# Patient Record
Sex: FEMALE | Race: WHITE | NOT HISPANIC OR LATINO | Employment: OTHER | URBAN - NONMETROPOLITAN AREA
[De-identification: names, ages, dates, MRNs, and addresses within clinical notes are randomized per-mention and may not be internally consistent; named-entity substitution may affect disease eponyms.]

---

## 2018-01-26 ENCOUNTER — DOCUMENTATION ONLY (OUTPATIENT)
Dept: FAMILY MEDICINE | Facility: OTHER | Age: 23
End: 2018-01-26

## 2018-06-24 ENCOUNTER — OFFICE VISIT (OUTPATIENT)
Dept: FAMILY MEDICINE | Facility: OTHER | Age: 23
End: 2018-06-24
Attending: NURSE PRACTITIONER
Payer: COMMERCIAL

## 2018-06-24 VITALS
WEIGHT: 236.9 LBS | TEMPERATURE: 98.7 F | HEART RATE: 80 BPM | HEIGHT: 67 IN | DIASTOLIC BLOOD PRESSURE: 66 MMHG | BODY MASS INDEX: 37.18 KG/M2 | SYSTOLIC BLOOD PRESSURE: 112 MMHG | RESPIRATION RATE: 18 BRPM

## 2018-06-24 DIAGNOSIS — R39.89 URINARY PROBLEM: Primary | ICD-10-CM

## 2018-06-24 DIAGNOSIS — N30.01 ACUTE CYSTITIS WITH HEMATURIA: ICD-10-CM

## 2018-06-24 LAB
ALBUMIN UR-MCNC: ABNORMAL MG/DL
APPEARANCE UR: CLEAR
BACTERIA #/AREA URNS HPF: ABNORMAL /HPF
BILIRUB UR QL STRIP: NEGATIVE
COLOR UR AUTO: YELLOW
GLUCOSE UR STRIP-MCNC: NEGATIVE MG/DL
HGB UR QL STRIP: ABNORMAL
KETONES UR STRIP-MCNC: NEGATIVE MG/DL
LEUKOCYTE ESTERASE UR QL STRIP: NEGATIVE
NITRATE UR QL: NEGATIVE
NON-SQ EPI CELLS #/AREA URNS LPF: ABNORMAL /LPF
PH UR STRIP: 6 PH (ref 5–7)
RBC #/AREA URNS AUTO: ABNORMAL /HPF
SOURCE: ABNORMAL
SP GR UR STRIP: >1.03 (ref 1–1.03)
UROBILINOGEN UR STRIP-ACNC: 0.2 EU/DL (ref 0.2–1)
WBC #/AREA URNS AUTO: ABNORMAL /HPF

## 2018-06-24 PROCEDURE — 81001 URINALYSIS AUTO W/SCOPE: CPT | Performed by: NURSE PRACTITIONER

## 2018-06-24 PROCEDURE — G0463 HOSPITAL OUTPT CLINIC VISIT: HCPCS

## 2018-06-24 PROCEDURE — 87086 URINE CULTURE/COLONY COUNT: CPT | Performed by: NURSE PRACTITIONER

## 2018-06-24 PROCEDURE — 99213 OFFICE O/P EST LOW 20 MIN: CPT | Performed by: NURSE PRACTITIONER

## 2018-06-24 RX ORDER — PHENAZOPYRIDINE HYDROCHLORIDE 200 MG/1
200 TABLET, FILM COATED ORAL 3 TIMES DAILY PRN
Qty: 6 TABLET | Refills: 0 | Status: SHIPPED | OUTPATIENT
Start: 2018-06-24 | End: 2018-09-14

## 2018-06-24 RX ORDER — SULFAMETHOXAZOLE/TRIMETHOPRIM 800-160 MG
1 TABLET ORAL 2 TIMES DAILY
Qty: 14 TABLET | Refills: 0 | Status: SHIPPED | OUTPATIENT
Start: 2018-06-24 | End: 2018-09-14

## 2018-06-24 ASSESSMENT — PAIN SCALES - GENERAL: PAINLEVEL: NO PAIN (0)

## 2018-06-24 NOTE — PATIENT INSTRUCTIONS
Urinary Tract Infections in Women    Urinary tract infections (UTIs) are most often caused by bacteria. These bacteria enter the urinary tract. The bacteria may come from outside the body. Or they may travel from the skin outside the rectum or vagina into the urethra. Female anatomy makes it easy for bacteria from the bowel to enter a woman s urinary tract, which is the most common source of UTI. This means women develop UTIs more often than men. Pain in or around the urinary tract is a common UTI symptom. But the only way to know for sure if you have a UTI for the healthcare provider to test your urine. The two tests that may be done are the urinalysis and urine culture.  Types of UTIs    Cystitis. A bladder infection (cystitis) is the most common UTI in women. You may have urgent or frequent urination. You may also have pain, burning when you urinate, and bloody urine.    Urethritis. This is an inflamed urethra, which is the tube that carries urine from the bladder to outside the body. You may have lower stomach or back pain. You may also have urgent or frequent urination.    Pyelonephritis. This is a kidney infection. If not treated, it can be serious and damage your kidneys. In severe cases, you may need to stay in the hospital. You may have a fever and lower back pain.  Medicines to treat a UTI  Most UTIs are treated with antibiotics. These kill the bacteria. The length of time you need to take them depends on the type of infection. It may be as short as 3 days. If you have repeated UTIs, you may need a low-dose antibiotic for several months. Take antibiotics exactly as directed. Don t stop taking them until all of the medicine is gone. If you stop taking the antibiotic too soon, the infection may not go away. You may also develop a resistance to the antibiotic. This can make it much harder to treat.  Lifestyle changes to treat and prevent UTIs  The lifestyle changes below will help get rid of your UTI. They  may also help prevent future UTIs.    Drink plenty of fluids. This includes water, juice, or other caffeine-free drinks. Fluids help flush bacteria out of your body.    Empty your bladder. Always empty your bladder when you feel the urge to urinate. And always urinate before going to sleep. Urine that stays in your bladder can lead to infection. Try to urinate before and after sex as well.    Practice good personal hygiene. Wipe yourself from front to back after using the toilet. This helps keep bacteria from getting into the urethra.    Use condoms during sex. These help prevent UTIs caused by sexually transmitted bacteria. Also don't use spermicides during sex. These can increase the risk for UTIs. Choose other forms of birth control instead. For women who tend to get UTIs after sex, a low-dose of a preventive antibiotic may be used. Be sure to discuss this option with your healthcare provider.    Follow up with your healthcare provider as directed. He or she may test to make sure the infection has cleared. If needed, more treatment may be started.  Date Last Reviewed: 1/1/2017 2000-2017 [employer]. [address] [place] [address]. All rights reserved. This information is not intended as a substitute for professional medical care. Always follow your healthcare professional's instructions.

## 2018-06-24 NOTE — LETTER
[facility]  [address]  [place] [address]  Phone: [phone]  Fax: [phone]    June 24, 2018        Alize Clifton  [address] [place] [id number]          To whom it may concern:    RE: Alize Clifton    Patient was seen and treated today at our clinic and missed work.    Please contact me for questions or concerns.      Sincerely,        Nieves Parmar NP

## 2018-06-24 NOTE — NURSING NOTE
Patient presents to the clinic for possible UTI. Patient states her s/sx include front and back cramps, burning with urination, frequency and blood in urine. S/sx started about 3 or 4 days ago.   Marguerite Vargas LPN............. June 24, 2018 6:40 PM

## 2018-06-24 NOTE — MR AVS SNAPSHOT
After Visit Summary   6/24/2018    Alzie Clifton    MRN: [id number]           Patient Information     Date Of Birth          1995        Visit Information        Provider Department      6/24/2018 6:15 PM Nieves Parmar NP [facility]        Today's Diagnoses     Urinary problem    -  1    Acute cystitis with hematuria          Care Instructions      Urinary Tract Infections in Women    Urinary tract infections (UTIs) are most often caused by bacteria. These bacteria enter the urinary tract. The bacteria may come from outside the body. Or they may travel from the skin outside the rectum or vagina into the urethra. Female anatomy makes it easy for bacteria from the bowel to enter a woman s urinary tract, which is the most common source of UTI. This means women develop UTIs more often than men. Pain in or around the urinary tract is a common UTI symptom. But the only way to know for sure if you have a UTI for the healthcare provider to test your urine. The two tests that may be done are the urinalysis and urine culture.  Types of UTIs    Cystitis. A bladder infection (cystitis) is the most common UTI in women. You may have urgent or frequent urination. You may also have pain, burning when you urinate, and bloody urine.    Urethritis. This is an inflamed urethra, which is the tube that carries urine from the bladder to outside the body. You may have lower stomach or back pain. You may also have urgent or frequent urination.    Pyelonephritis. This is a kidney infection. If not treated, it can be serious and damage your kidneys. In severe cases, you may need to stay in the hospital. You may have a fever and lower back pain.  Medicines to treat a UTI  Most UTIs are treated with antibiotics. These kill the bacteria. The length of time you need to take them depends on the type of infection. It may be as short as 3 days. If you have repeated UTIs, you may need a low-dose  antibiotic for several months. Take antibiotics exactly as directed. Don t stop taking them until all of the medicine is gone. If you stop taking the antibiotic too soon, the infection may not go away. You may also develop a resistance to the antibiotic. This can make it much harder to treat.  Lifestyle changes to treat and prevent UTIs  The lifestyle changes below will help get rid of your UTI. They may also help prevent future UTIs.    Drink plenty of fluids. This includes water, juice, or other caffeine-free drinks. Fluids help flush bacteria out of your body.    Empty your bladder. Always empty your bladder when you feel the urge to urinate. And always urinate before going to sleep. Urine that stays in your bladder can lead to infection. Try to urinate before and after sex as well.    Practice good personal hygiene. Wipe yourself from front to back after using the toilet. This helps keep bacteria from getting into the urethra.    Use condoms during sex. These help prevent UTIs caused by sexually transmitted bacteria. Also don't use spermicides during sex. These can increase the risk for UTIs. Choose other forms of birth control instead. For women who tend to get UTIs after sex, a low-dose of a preventive antibiotic may be used. Be sure to discuss this option with your healthcare provider.    Follow up with your healthcare provider as directed. He or she may test to make sure the infection has cleared. If needed, more treatment may be started.  Date Last Reviewed: 1/1/2017 2000-2017 [employer]. [address] [place] [address], [place] [address]. All rights reserved. This information is not intended as a substitute for professional medical care. Always follow your healthcare professional's instructions.                Follow-ups after your visit        Who to contact     If you have questions or need follow up information about today's clinic visit or your schedule please contact [facility] directly at [phone].  Normal or non-critical lab and imaging results will be communicated to you by MyChart, letter or phone within 4 business days after the clinic has received the results. If you do not hear from us within 7 days, please contact the clinic through Rambushart or phone. If you have a critical or abnormal lab result, we will notify you by phone as soon as possible.  Submit refill requests through Fuhu or call your pharmacy and they will forward the refill request to us. Please allow 3 business days for your refill to be completed.          Additional Information About Your Visit        Rambushart Information     Fuhu lets you send messages to your doctor, view your test results, renew your prescriptions, schedule appointments and more. To sign up, go to [email or web address] . Click on \"Log in\" on the left side of the screen, which will take you to the Welcome page. Then click on \"Sign up Now\" on the right side of the page.     You will be asked to enter the access code listed below, as well as some personal information. Please follow the directions to create your username and password.     Your access code is: [email or web address]  Expires: 2018  6:55 PM     Your access code will  in 90 days. If you need help or a new code, please call your [facility] clinic or [phone].        Care EveryWhere ID     This is your Care EveryWhere ID. This could be used by other organizations to access your [facility] medical records  [id number]        Your Vitals Were     Pulse Temperature Respirations Height Last Period Breastfeeding?    80 98.7  F (37.1  C) (Tympanic) 18 5' 7\" (1.702 m) 2018 No    BMI (Body Mass Index)                   37.1 kg/m2            Blood Pressure from Last 3 Encounters:   18 112/66   16 128/68    Weight from Last 3 Encounters:   18 236 lb 14.4 oz (107.5 kg)   16 202 lb 12.8 oz (92 kg) (98 %)*     * Growth percentiles are based on CDC 2-20 " Years data.              We Performed the Following     *UA reflex to Microscopic     Urine Culture Aerobic Bacterial     Urine Microscopic          Today's Medication Changes          These changes are accurate as of 6/24/18  6:55 PM.  If you have any questions, ask your nurse or doctor.               Start taking these medicines.        Dose/Directions    phenazopyridine 200 MG tablet   Commonly known as:  PYRIDIUM   Used for:  Acute cystitis with hematuria   Started by:  Nieves Parmar NP        Dose:  200 mg   Take 1 tablet (200 mg) by mouth 3 times daily as needed for irritation   Quantity:  6 tablet   Refills:  0       sulfamethoxazole-trimethoprim 800-160 MG per tablet   Commonly known as:  BACTRIM DS/SEPTRA DS   Used for:  Acute cystitis with hematuria   Started by:  Nieves Parmar NP        Dose:  1 tablet   Take 1 tablet by mouth 2 times daily   Quantity:  14 tablet   Refills:  0            Where to get your medicines      Information about where to get these medications is not yet available     ! Ask your nurse or doctor about these medications     phenazopyridine 200 MG tablet    sulfamethoxazole-trimethoprim 800-160 MG per tablet                Primary Care Provider Office Phone # Fax Barber Beard Ayazin [phone]       [facility] [address] [facility] [address]        Equal Access to Services     [facility] AH: Hadii andrew Llamas, waluísda lusanya, qaybta kaalmada adejean carlos, shante cortez . So [facility] [phone].    ATENCIÓN: Si habla español, tiene a mireles disposición servicios gratuitos de asistencia lingüística. Llame al [phone].    We comply with applicable federal civil rights laws and [place] laws. We do not discriminate on the basis of race, color, national origin, age, disability, sex, sexual orientation, or gender identity.            Thank you!     Thank you for choosing [facility]  for your care.  Our goal is always to provide you with excellent care. Hearing back from our patients is one way we can continue to improve our services. Please take a few minutes to complete the written survey that you may receive in the mail after your visit with us. Thank you!             Your Updated Medication List - Protect others around you: Learn how to safely use, store and throw away your medicines at www.disposemymeds.org.          This list is accurate as of 6/24/18  6:55 PM.  Always use your most recent med list.                   Brand Name Dispense Instructions for use Diagnosis    phenazopyridine 200 MG tablet    PYRIDIUM    6 tablet    Take 1 tablet (200 mg) by mouth 3 times daily as needed for irritation    Acute cystitis with hematuria       sulfamethoxazole-trimethoprim 800-160 MG per tablet    BACTRIM DS/SEPTRA DS    14 tablet    Take 1 tablet by mouth 2 times daily    Acute cystitis with hematuria

## 2018-06-24 NOTE — PROGRESS NOTES
"Nursing Notes:   Marguerite Vargas LPN  6/24/2018  6:40 PM  Addendum  Patient presents to the clinic for possible UTI. Patient states her s/sx include front and back cramps, burning with urination, frequency and blood in urine. S/sx started about 3 or 4 days ago.   Marguerite Vargas LPN............. June 24, 2018 6:40 PM     SUBJECTIVE:   Alize Clifton is a 22 year old female who presents to clinic today for the following health issues:    URINARY TRACT SYMPTOMS      Duration: 3 days    Description  dysuria, frequency, urgency and hematuria    Intensity:  moderate    Accompanying signs and symptoms:  Fever/chills: no   Flank pain no   Nausea and vomiting: no   Vaginal symptoms: none  Abdominal/Pelvic Pain: no     History  History of frequent UTI's: no   History of kidney stones: no   Sexually Active: YES  Possibility of pregnancy: No    Precipitating or alleviating factors: None    Therapies tried and outcome: increase fluid intake   Outcome: Not helpful        Problem list and histories reviewed & adjusted, as indicated.  Additional history: as documented    Current Outpatient Prescriptions   Medication Sig Dispense Refill     phenazopyridine (PYRIDIUM) 200 MG tablet Take 1 tablet (200 mg) by mouth 3 times daily as needed for irritation 6 tablet 0     sulfamethoxazole-trimethoprim (BACTRIM DS/SEPTRA DS) 800-160 MG per tablet Take 1 tablet by mouth 2 times daily 14 tablet 0     Allergies   Allergen Reactions     Azithromycin Rash         ROS:  Notable findings in the HPI.       OBJECTIVE:     /66 (BP Location: Right arm, Patient Position: Sitting, Cuff Size: Adult Regular)  Pulse 80  Temp 98.7  F (37.1  C) (Tympanic)  Resp 18  Ht 5' 7\" (1.702 m)  Wt 236 lb 14.4 oz (107.5 kg)  LMP 05/13/2018  Breastfeeding? No  BMI 37.1 kg/m2  Body mass index is 37.1 kg/(m^2).  GENERAL: healthy, alert and no distress  HENT: normal cephalic/atraumatic and oral mucous membranes moist  RESP: with " ease  ABDOMEN: tenderness suprapubic and bowel sounds normal  SKIN: no suspicious lesions or rashes    Diagnostic Test Results:  Results for orders placed or performed in visit on 06/24/18 (from the past 24 hour(s))   *UA reflex to Microscopic   Result Value Ref Range    Color Urine Yellow     Appearance Urine Clear     Glucose Urine Negative NEG^Negative mg/dL    Bilirubin Urine Negative NEG^Negative    Ketones Urine Negative NEG^Negative mg/dL    Specific Gravity Urine >1.030 1.003 - 1.035    Blood Urine Trace (A) NEG^Negative    pH Urine 6.0 5.0 - 7.0 pH    Protein Albumin Urine Trace (A) NEG^Negative mg/dL    Urobilinogen Urine 0.2 0.2 - 1.0 EU/dL    Nitrite Urine Negative NEG^Negative    Leukocyte Esterase Urine Negative NEG^Negative    Source Midstream Urine    Urine Microscopic   Result Value Ref Range    WBC Urine 5-10 (A) OTO5^0 - 5 /HPF    RBC Urine O - 2 OTO2^O - 2 /HPF    Squamous Epithelial /LPF Urine Few FEW^Few /LPF    Bacteria Urine Many (A) NEG^Negative /HPF       ASSESSMENT/PLAN:     1. Urinary problem  - *UA reflex to Microscopic  - Urine Culture Aerobic Bacterial  - Urine Microscopic    2. Acute cystitis with hematuria  - sulfamethoxazole-trimethoprim (BACTRIM DS/SEPTRA DS) 800-160 MG per tablet; Take 1 tablet by mouth 2 times daily  Dispense: 14 tablet; Refill: 0  - phenazopyridine (PYRIDIUM) 200 MG tablet; Take 1 tablet (200 mg) by mouth 3 times daily as needed for irritation  Dispense: 6 tablet; Refill: 0    Medical Decision Making:    Differential Diagnosis:  UTI: UTI, Dysuria and Pyelonephritis    Serious Comorbid Conditions:  Adult:  None    PLAN:    UTI Adult:  Meds as per [facility]. F/U if needed. Push fluids.     Followup:    If not improving or if condition worsens, follow up with your Primary Care Provider    Disclaimer:  This note consists of words and symbols derived from keyboarding, dictation, or using voice recognition software. As a result, there may be errors in the script that  have gone undetected. Please consider this when interpreting information found in this note.      Nieves Parmar NP, 6/24/2018 6:42 PM

## 2018-06-26 LAB
BACTERIA SPEC CULT: NORMAL
SPECIMEN SOURCE: NORMAL

## 2018-09-14 ENCOUNTER — HOSPITAL ENCOUNTER (OUTPATIENT)
Dept: GENERAL RADIOLOGY | Facility: OTHER | Age: 23
Discharge: HOME | End: 2018-09-14
Attending: NURSE PRACTITIONER | Admitting: NURSE PRACTITIONER
Payer: COMMERCIAL

## 2018-09-14 ENCOUNTER — OFFICE VISIT (OUTPATIENT)
Dept: FAMILY MEDICINE | Facility: OTHER | Age: 23
End: 2018-09-14
Payer: COMMERCIAL

## 2018-09-14 VITALS
TEMPERATURE: 98.7 F | OXYGEN SATURATION: 97 % | SYSTOLIC BLOOD PRESSURE: 128 MMHG | HEIGHT: 67 IN | BODY MASS INDEX: 35.99 KG/M2 | WEIGHT: 229.3 LBS | HEART RATE: 93 BPM | DIASTOLIC BLOOD PRESSURE: 70 MMHG

## 2018-09-14 DIAGNOSIS — J06.9 URI WITH COUGH AND CONGESTION: Primary | ICD-10-CM

## 2018-09-14 LAB
BASOPHILS # BLD AUTO: 0 10E9/L (ref 0–0.2)
BASOPHILS NFR BLD AUTO: 0.3 %
DIFFERENTIAL METHOD BLD: NORMAL
EOSINOPHIL # BLD AUTO: 0.1 10E9/L (ref 0–0.7)
EOSINOPHIL NFR BLD AUTO: 1 %
ERYTHROCYTE [DISTWIDTH] IN BLOOD BY AUTOMATED COUNT: 13.2 % (ref 10–15)
HCT VFR BLD AUTO: 39.4 % (ref 35–47)
HGB BLD-MCNC: 13.3 G/DL (ref 11.7–15.7)
IMM GRANULOCYTES # BLD: 0 10E9/L (ref 0–0.4)
IMM GRANULOCYTES NFR BLD: 0.4 %
LYMPHOCYTES # BLD AUTO: 2.1 10E9/L (ref 0.8–5.3)
LYMPHOCYTES NFR BLD AUTO: 21.4 %
MCH RBC QN AUTO: 28.5 PG (ref 26.5–33)
MCHC RBC AUTO-ENTMCNC: 33.8 G/DL (ref 31.5–36.5)
MCV RBC AUTO: 84 FL (ref 78–100)
MONOCYTES # BLD AUTO: 0.5 10E9/L (ref 0–1.3)
MONOCYTES NFR BLD AUTO: 5.4 %
NEUTROPHILS # BLD AUTO: 7.1 10E9/L (ref 1.6–8.3)
NEUTROPHILS NFR BLD AUTO: 71.5 %
PLATELET # BLD AUTO: 332 10E9/L (ref 150–450)
RBC # BLD AUTO: 4.67 10E12/L (ref 3.8–5.2)
WBC # BLD AUTO: 9.9 10E9/L (ref 4–11)

## 2018-09-14 PROCEDURE — 36415 COLL VENOUS BLD VENIPUNCTURE: CPT | Performed by: NURSE PRACTITIONER

## 2018-09-14 PROCEDURE — G0463 HOSPITAL OUTPT CLINIC VISIT: HCPCS | Mod: 25

## 2018-09-14 PROCEDURE — 85025 COMPLETE CBC W/AUTO DIFF WBC: CPT | Performed by: NURSE PRACTITIONER

## 2018-09-14 PROCEDURE — G0463 HOSPITAL OUTPT CLINIC VISIT: HCPCS

## 2018-09-14 PROCEDURE — 99214 OFFICE O/P EST MOD 30 MIN: CPT | Performed by: NURSE PRACTITIONER

## 2018-09-14 PROCEDURE — 71046 X-RAY EXAM CHEST 2 VIEWS: CPT

## 2018-09-14 RX ORDER — BENZONATATE 200 MG/1
200 CAPSULE ORAL 3 TIMES DAILY PRN
Qty: 21 CAPSULE | Refills: 0 | Status: SHIPPED | OUTPATIENT
Start: 2018-09-14 | End: 2018-10-29

## 2018-09-14 ASSESSMENT — PAIN SCALES - GENERAL: PAINLEVEL: NO PAIN (0)

## 2018-09-14 NOTE — PATIENT INSTRUCTIONS
Viral Upper Respiratory Illness (Adult)  You have a viral upper respiratory illness (URI), which is another term for the common cold. This illness is contagious during the first few days. It is spread through the air by coughing and sneezing. It may also be spread by direct contact (touching the sick person and then touching your own eyes, nose, or mouth). Frequent handwashing will decrease risk of spread. Most viral illnesses go away within 7 to 10 days with rest and simple home remedies. Sometimes the illness may last for several weeks. Antibiotics will not kill a virus, and they are generally not prescribed for this condition.  Home care    If symptoms are severe, rest at home for the first 2 to 3 days. When you resume activity, don't let yourself get too tired.    Avoid being exposed to cigarette smoke (yours or others ).    You may use acetaminophen or ibuprofen to control pain and fever, unless another medicine was prescribed. If you have chronic liver or kidney disease, have ever had a stomach ulcer or gastrointestinal bleeding, or are taking blood-thinning medicines, talk with your healthcare provider before using these medicines. Aspirin should never be given to anyone under 18 years of age who is ill with a viral infection or fever. It may cause severe liver or brain damage.    Your appetite may be poor, so a light diet is fine. Avoid dehydration by drinking 6 to 8 glasses of fluids per day (water, soft drinks, juices, tea, or soup). Extra fluids will help loosen secretions in the nose and lungs.    Over-the-counter cold medicines will not shorten the length of time you re sick, but they may be helpful for the following symptoms: cough, sore throat, and nasal and sinus congestion. (Note: Do not use decongestants if you have high blood pressure.)  Follow-up care  Follow up with your healthcare provider, or as advised.  When to seek medical advice  Call your healthcare provider right away if any of these  occur:    Cough with lots of colored sputum (mucus)    Severe headache; face, neck, or ear pain    Difficulty swallowing due to throat pain    Fever of 100.4 F (38 C) or higher, or as directed by your healthcare provider  Call 911  Call 911 if any of these occur:    Chest pain, shortness of breath, wheezing, or difficulty breathing    Coughing up blood    Inability to swallow due to throat pain      Cold Medicines   What are cold medicines?  Symptoms of the common cold start gradually over several days and usually last about two weeks. Symptoms may include sneezing, a stuffy or runny nose, sore throat, cough, watery eyes, mild headache, or body aches. A cold will go away on its own without treatment. However, there are many nonprescription products that may help relieve some of the symptoms of a cold. Cold medicines often contain more than one ingredient and are used to treat more than one symptom. Read the labels and buy products that have only the ingredients that you need. If you are not sure which medicine is best, ask your pharmacist.  How do they work?  Decongestants reduce swelling in your nose and sinuses. They may also lessen the amount of mucus made by your nose. If you use decongestants more often than directed, your stuffy nose may get worse.   Antihistamines block the effect of histamine. Histamine is a chemical your body makes when you have an allergic reaction. Antihistamines are most often used to treat itchy or watery eyes or a stuffy or runny nose caused by an allergy. Antihistamines may not help a stuffy or runny nose caused by a cold because they can make mucus thick and dry.  Mucolytics are medicines that make mucus thinner so that it is easier to cough up out of your throat and lungs.  Expectorants are cough medicines that may help to keep the mucus thin and bring up mucus from the lungs when you cough. This may relieve chest congestion and make it easier to breathe.   Cough suppressants  (antitussives) are medicines that lessen the urge to cough. They may give relief from a dry, hacking cough. If you have a cough that is wet sounding and produces mucus, it is important for you to cough the mucus up out of your lungs. For this reason, cough suppressants are not recommended for a wet sounding cough.  Fever and pain relievers, such as acetaminophen, aspirin, or other nonsteroidal anti-inflammatory drugs (NSAIDs), are often included in cold medicine. Read labels carefully to avoid taking more medicine than you need.  What else do I need to know about this medicine?    Talk to your healthcare provider if your symptoms start suddenly or you have severe symptoms. This may mean you have something more serious than a cold.    Follow the directions that come with your medicine, including information about food or alcohol. Make sure you know how and when to take your medicine. Do not take more or less than you are supposed to take.    Try to get all of your medicine at the same place. Your pharmacist can help make sure that all of your medicines are safe to take together.    Keep a list of your medicines with you. List all of the prescription medicines, nonprescription medicines, supplements, natural remedies, and vitamins that you take. Tell all healthcare providers who treat you about all of the products you are taking.    Many medicines have side effects. A side effect is a symptom or problem that is caused by the medicine. Ask your healthcare provider or pharmacist what side effects the medicine may cause and what you should do if you have side effects.    Nonsteroidal anti-inflammatory medicines (NSAIDs), such as ibuprofen, naproxen, and aspirin, may cause stomach bleeding and other problems. These risks increase with age. Read the label and take as directed. Unless recommended by your healthcare provider, do not take for more than 10 days for any reason.    Acetaminophen may cause liver damage or other  problems. Unless recommended by your provider, don't take more than 3000 milligrams (mg) in 24 hours. To make sure you don t take too much, check other medicines you take to see if they also contain acetaminophen. Ask your provider if you need to avoid drinking alcohol while taking this medicine.  If you have any questions, ask your healthcare provider or pharmacist for more information. Be sure to keep all appointments for provider visits or tests.

## 2018-09-14 NOTE — PROGRESS NOTES
"Nursing Notes:   Kelsey Hudson LPN  9/14/2018 11:34 AM  Signed  Patient presents with cough occasionally productive green, shortness of breath, fever for 1 week. Kelsey Husdon LPN .............9/14/2018  11:25 AM  Medication Reconciliation: complete    Kelsey Hudson LPN  ..................9/14/2018   11:25 AM    SUBJECTIVE:   Alize Clifton is a 22 year old female who presents to clinic today for the following health issues:    RESPIRATORY SYMPTOMS      Duration: 1 week, getting worse.     Description  nasal congestion, cough, myalgias and sweats and chills    Severity: moderate    Accompanying signs and symptoms: Fever on Monday, none noted at home. Has chills. Cough sometimes productive.     History (predisposing factors):  tobacco abuse and no asthma.     Precipitating or alleviating factors: None    Therapies tried and outcome:  rest and fluids acetaminophen      Problem list and histories reviewed & adjusted, as indicated.  Additional history: as documented    No current outpatient prescriptions on file.     Allergies   Allergen Reactions     Azithromycin Rash         ROS:  Notable findings in the HPI.       OBJECTIVE:     /70 (BP Location: Right arm, Patient Position: Sitting, Cuff Size: Adult Regular)  Pulse 93  Temp 98.7  F (37.1  C) (Tympanic)  Ht 5' 7.32\" (1.71 m)  Wt 229 lb 4.8 oz (104 kg)  LMP 08/13/2018  SpO2 97%  Breastfeeding? No  BMI 35.57 kg/m2  Body mass index is 35.57 kg/(m^2).  GENERAL: healthy, alert and no distress  EYES: Eyes grossly normal to inspection  HENT: normal cephalic/atraumatic, right ear: normal: no effusions, no erythema, normal landmarks, left ear: normal: no effusions, no erythema, normal landmarks, nose and mouth without ulcers or lesions, nasal mucosa edematous , rhinorrhea clear, oropharynx clear, oral mucous membranes moist and sinuses: not tender  NECK: no adenopathy  RESP: lungs clear to auscultation - no rales, rhonchi or wheezes " and dry cough noted  CV: regular rates and rhythm, normal S1 S2, no S3 or S4 and no murmur, click or rub  SKIN: no suspicious lesions or rashes  PSYCH: mentation appears normal, affect normal/bright    Diagnostic Test Results:  Results for orders placed or performed in visit on 09/14/18 (from the past 24 hour(s))   CBC with platelets differential   Result Value Ref Range    WBC 9.9 4.0 - 11.0 10e9/L    RBC Count 4.67 3.8 - 5.2 10e12/L    Hemoglobin 13.3 11.7 - 15.7 g/dL    Hematocrit 39.4 35.0 - 47.0 %    MCV 84 78 - 100 fl    MCH 28.5 26.5 - 33.0 pg    MCHC 33.8 31.5 - 36.5 g/dL    RDW 13.2 10.0 - 15.0 %    Platelet Count 332 150 - 450 10e9/L    Diff Method Automated Method     % Neutrophils 71.5 %    % Lymphocytes 21.4 %    % Monocytes 5.4 %    % Eosinophils 1.0 %    % Basophils 0.3 %    % Immature Granulocytes 0.4 %    Absolute Neutrophil 7.1 1.6 - 8.3 10e9/L    Absolute Lymphocytes 2.1 0.8 - 5.3 10e9/L    Absolute Monocytes 0.5 0.0 - 1.3 10e9/L    Absolute Eosinophils 0.1 0.0 - 0.7 10e9/L    Absolute Basophils 0.0 0.0 - 0.2 10e9/L    Abs Immature Granulocytes 0.0 0 - 0.4 10e9/L   XR Chest 2 Views    Narrative    PROCEDURE:  XR CHEST 2 VW    HISTORY:  ; URI with cough and congestion.     COMPARISON:  None.    FINDINGS:   The cardiac silhouette is normal in size. The pulmonary vasculature is  normal.  The lungs are clear. No pleural effusion or pneumothorax.      Impression    IMPRESSION:  No acute cardiopulmonary disease.      ELLA MOREJON MD     Completed chest xray.  I personally reviewed the xray. There was no infiltrates noted upon initial read of xray.  Final read pending by radiology.    ASSESSMENT/PLAN:     1. URI with cough and congestion  - CBC with platelets differential  - XR Chest 2 Views  - benzonatate (TESSALON) 200 MG capsule; Take 1 capsule (200 mg) by mouth 3 times daily as needed for cough  Dispense: 21 capsule; Refill: 0      PLAN:    URI Adult:  Tylenol, Ibuprofen, Fluids, Rest, OTC cough  suppressant/expectorant, OTC decongestant/antihistamine, Saline gargles, Saline nasal spray, Smoking discouraged and Vaporizer  Symptoms likely due to virus. No antibiotic is needed at this time. Symptoms typically worse on days 2-5 and then stabilize and you are sick for days 5-12. Days 12-14 there is slow resolution and if there is a cough, studies show it can linger longer, however one is not as ill as in the beginning. If symptoms begin worsening or fail to improve after 14 days, return to clinic for reevaluation. All questions were answered and she is in agreement with plan.       Followup:    If not improving or if condition worsens, follow up with your Primary Care Provider    I explained my diagnostic considerations and recommendations to the patient, who voiced understanding and agreement with the treatment plan. All questions were answered. We discussed potential side effects of any prescribed or recommended therapies, as well as expectations for response to treatments. She was advised to contact PCP office if there is no improvement or worsening of conditions or symptoms.  If s/s worsen or persist, patient will either come back or follow up with PCP.    Disclaimer:  This note consists of words and symbols derived from keyboarding, dictation, or using voice recognition software. As a result, there may be errors in the script that have gone undetected. Please consider this when interpreting information found in this note.      Nieves Parmar NP, 9/14/2018 11:34 AM

## 2018-09-14 NOTE — NURSING NOTE
Patient presents with cough occasionally productive green, shortness of breath, fever for 1 week. Kelsey Hudson LPN .............9/14/2018  11:25 AM  Medication Reconciliation: complete    Kelsey Hudson LPN  ..................9/14/2018   11:25 AM

## 2018-09-14 NOTE — MR AVS SNAPSHOT
After Visit Summary   9/14/2018    Alize Clifton    MRN: [id number]           Patient Information     Date Of Birth          1995        Visit Information        Provider Department      9/14/2018 11:00 AM Nieves Parmar NP [facility]        Today's Diagnoses     URI with cough and congestion    -  1      Care Instructions      Viral Upper Respiratory Illness (Adult)  You have a viral upper respiratory illness (URI), which is another term for the common cold. This illness is contagious during the first few days. It is spread through the air by coughing and sneezing. It may also be spread by direct contact (touching the sick person and then touching your own eyes, nose, or mouth). Frequent handwashing will decrease risk of spread. Most viral illnesses go away within 7 to 10 days with rest and simple home remedies. Sometimes the illness may last for several weeks. Antibiotics will not kill a virus, and they are generally not prescribed for this condition.  Home care    If symptoms are severe, rest at home for the first 2 to 3 days. When you resume activity, don't let yourself get too tired.    Avoid being exposed to cigarette smoke (yours or others ).    You may use acetaminophen or ibuprofen to control pain and fever, unless another medicine was prescribed. If you have chronic liver or kidney disease, have ever had a stomach ulcer or gastrointestinal bleeding, or are taking blood-thinning medicines, talk with your healthcare provider before using these medicines. Aspirin should never be given to anyone under 18 years of age who is ill with a viral infection or fever. It may cause severe liver or brain damage.    Your appetite may be poor, so a light diet is fine. Avoid dehydration by drinking 6 to 8 glasses of fluids per day (water, soft drinks, juices, tea, or soup). Extra fluids will help loosen secretions in the nose and lungs.    Over-the-counter cold medicines will  not shorten the length of time you re sick, but they may be helpful for the following symptoms: cough, sore throat, and nasal and sinus congestion. (Note: Do not use decongestants if you have high blood pressure.)  Follow-up care  Follow up with your healthcare provider, or as advised.  When to seek medical advice  Call your healthcare provider right away if any of these occur:    Cough with lots of colored sputum (mucus)    Severe headache; face, neck, or ear pain    Difficulty swallowing due to throat pain    Fever of 100.4 F (38 C) or higher, or as directed by your healthcare provider  Call 911  Call 911 if any of these occur:    Chest pain, shortness of breath, wheezing, or difficulty breathing    Coughing up blood    Inability to swallow due to throat pain      Cold Medicines   What are cold medicines?  Symptoms of the common cold start gradually over several days and usually last about two weeks. Symptoms may include sneezing, a stuffy or runny nose, sore throat, cough, watery eyes, mild headache, or body aches. A cold will go away on its own without treatment. However, there are many nonprescription products that may help relieve some of the symptoms of a cold. Cold medicines often contain more than one ingredient and are used to treat more than one symptom. Read the labels and buy products that have only the ingredients that you need. If you are not sure which medicine is best, ask your pharmacist.  How do they work?  Decongestants reduce swelling in your nose and sinuses. They may also lessen the amount of mucus made by your nose. If you use decongestants more often than directed, your stuffy nose may get worse.   Antihistamines block the effect of histamine. Histamine is a chemical your body makes when you have an allergic reaction. Antihistamines are most often used to treat itchy or watery eyes or a stuffy or runny nose caused by an allergy. Antihistamines may not help a stuffy or runny nose caused by a  cold because they can make mucus thick and dry.  Mucolytics are medicines that make mucus thinner so that it is easier to cough up out of your throat and lungs.  Expectorants are cough medicines that may help to keep the mucus thin and bring up mucus from the lungs when you cough. This may relieve chest congestion and make it easier to breathe.   Cough suppressants (antitussives) are medicines that lessen the urge to cough. They may give relief from a dry, hacking cough. If you have a cough that is wet sounding and produces mucus, it is important for you to cough the mucus up out of your lungs. For this reason, cough suppressants are not recommended for a wet sounding cough.  Fever and pain relievers, such as acetaminophen, aspirin, or other nonsteroidal anti-inflammatory drugs (NSAIDs), are often included in cold medicine. Read labels carefully to avoid taking more medicine than you need.  What else do I need to know about this medicine?    Talk to your healthcare provider if your symptoms start suddenly or you have severe symptoms. This may mean you have something more serious than a cold.    Follow the directions that come with your medicine, including information about food or alcohol. Make sure you know how and when to take your medicine. Do not take more or less than you are supposed to take.    Try to get all of your medicine at the same place. Your pharmacist can help make sure that all of your medicines are safe to take together.    Keep a list of your medicines with you. List all of the prescription medicines, nonprescription medicines, supplements, natural remedies, and vitamins that you take. Tell all healthcare providers who treat you about all of the products you are taking.    Many medicines have side effects. A side effect is a symptom or problem that is caused by the medicine. Ask your healthcare provider or pharmacist what side effects the medicine may cause and what you should do if you have side  effects.    Nonsteroidal anti-inflammatory medicines (NSAIDs), such as ibuprofen, naproxen, and aspirin, may cause stomach bleeding and other problems. These risks increase with age. Read the label and take as directed. Unless recommended by your healthcare provider, do not take for more than 10 days for any reason.    Acetaminophen may cause liver damage or other problems. Unless recommended by your provider, don't take more than 3000 milligrams (mg) in 24 hours. To make sure you don t take too much, check other medicines you take to see if they also contain acetaminophen. Ask your provider if you need to avoid drinking alcohol while taking this medicine.  If you have any questions, ask your healthcare provider or pharmacist for more information. Be sure to keep all appointments for provider visits or tests.                    Follow-ups after your visit        Follow-up notes from your care team     Return if symptoms worsen or fail to improve.      Who to contact     If you have questions or need follow up information about today's clinic visit or your schedule please contact [facility] directly at [phone].  Normal or non-critical lab and imaging results will be communicated to you by [employer], letter or phone within 4 business days after the clinic has received the results. If you do not hear from us within 7 days, please contact the clinic through [employer] or phone. If you have a critical or abnormal lab result, we will notify you by phone as soon as possible.  Submit refill requests through [employer] or call your pharmacy and they will forward the refill request to us. Please allow 3 business days for your refill to be completed.          Additional Information About Your Visit        [employer] Information     [employer] lets you send messages to your doctor, view your test results, renew your prescriptions, schedule appointments and more. To sign up, go to [email or web address] . Click on  "\"Log in\" on the left side of the screen, which will take you to the Welcome page. Then click on \"Sign up Now\" on the right side of the page.     You will be asked to enter the access code listed below, as well as some personal information. Please follow the directions to create your username and password.     Your access code is: [email or web address]  Expires: 2018  6:55 PM     Your access code will  in 90 days. If you need help or a new code, please call your [facility] clinic or [phone].        Care EveryWhere ID     This is your Care EveryWhere ID. This could be used by other organizations to access your [facility] medical records  [id number]        Your Vitals Were     Pulse Temperature Height Last Period Pulse Oximetry Breastfeeding?    93 98.7  F (37.1  C) (Tympanic) 5' 7.32\" (1.71 m) 2018 97% No    BMI (Body Mass Index)                   35.57 kg/m2            Blood Pressure from Last 3 Encounters:   18 128/70   18 112/66   16 128/68    Weight from Last 3 Encounters:   18 229 lb 4.8 oz (104 kg)   18 236 lb 14.4 oz (107.5 kg)   16 202 lb 12.8 oz (92 kg) (98 %)*     * Growth percentiles are based on [facility] 2-20 Years data.              We Performed the Following     CBC with platelets differential     XR Chest 2 Views          Today's Medication Changes          These changes are accurate as of 18 12:29 PM.  If you have any questions, ask your nurse or doctor.               Start taking these medicines.        Dose/Directions    benzonatate 200 MG capsule   Commonly known as:  TESSALON   Used for:  URI with cough and congestion   Started by:  Nieves Parmar NP        Dose:  200 mg   Take 1 capsule (200 mg) by mouth 3 times daily as needed for cough   Quantity:  21 capsule   Refills:  0            Where to get your medicines      These medications were sent to [facility] [address] [place] [address] [address] [facility] " [place] [address]     Phone:  [phone]     benzonatate 200 MG capsule                Primary Care Provider Office Phone # Fax #    Kisha Hugo [phone]       [facility] [address] [facility] [address]        Equal Access to Services     JEANNIE MENDEZ AH: Hadii andrew hendrickson hadálvaroo Soomaali, waaxda luqadaha, qaybta kaalmada adeegyada, shante arandain hayquiquen néstor robchema diallo. So [facility] [phone].    ATENCIÓN: Si habla español, tiene a mireles disposición servicios gratuitos de asistencia lingüística. Llame al [phone].    We comply with applicable federal civil rights laws and [place] laws. We do not discriminate on the basis of race, color, national origin, age, disability, sex, sexual orientation, or gender identity.            Thank you!     Thank you for choosing [facility] [place]  for your care. Our goal is always to provide you with excellent care. Hearing back from our patients is one way we can continue to improve our services. Please take a few minutes to complete the written survey that you may receive in the mail after your visit with us. Thank you!             Your Updated Medication List - Protect others around you: Learn how to safely use, store and throw away your medicines at www.disposemymeds.org.          This list is accurate as of 9/14/18 12:29 PM.  Always use your most recent med list.                   Brand Name Dispense Instructions for use Diagnosis    benzonatate 200 MG capsule    TESSALON    21 capsule    Take 1 capsule (200 mg) by mouth 3 times daily as needed for cough    URI with cough and congestion

## 2018-10-26 ENCOUNTER — HOSPITAL ENCOUNTER (EMERGENCY)
Facility: OTHER | Age: 23
Discharge: HOME | End: 2018-10-26
Attending: PHYSICIAN ASSISTANT | Admitting: PHYSICIAN ASSISTANT
Payer: COMMERCIAL

## 2018-10-26 VITALS
HEART RATE: 88 BPM | DIASTOLIC BLOOD PRESSURE: 67 MMHG | BODY MASS INDEX: 36.1 KG/M2 | OXYGEN SATURATION: 100 % | TEMPERATURE: 98.1 F | HEIGHT: 67 IN | WEIGHT: 230 LBS | SYSTOLIC BLOOD PRESSURE: 131 MMHG

## 2018-10-26 DIAGNOSIS — R10.9 ABDOMINAL CRAMPING AFFECTING PREGNANCY: ICD-10-CM

## 2018-10-26 DIAGNOSIS — O26.899 ABDOMINAL CRAMPING AFFECTING PREGNANCY: ICD-10-CM

## 2018-10-26 LAB
ALBUMIN SERPL-MCNC: 4 G/DL (ref 3.5–5.7)
ALBUMIN UR-MCNC: NEGATIVE MG/DL
ALP SERPL-CCNC: 49 U/L (ref 34–104)
ALT SERPL W P-5'-P-CCNC: 7 U/L (ref 7–52)
AMORPH CRY #/AREA URNS HPF: ABNORMAL /HPF
ANION GAP SERPL CALCULATED.3IONS-SCNC: 7 MMOL/L (ref 3–14)
APPEARANCE UR: CLEAR
AST SERPL W P-5'-P-CCNC: 9 U/L (ref 13–39)
B-HCG SERPL-ACNC: 378 IU/L
BACTERIA #/AREA URNS HPF: ABNORMAL /HPF
BASOPHILS # BLD AUTO: 0.1 10E9/L (ref 0–0.2)
BASOPHILS NFR BLD AUTO: 0.7 %
BILIRUB SERPL-MCNC: 0.3 MG/DL (ref 0.3–1)
BILIRUB UR QL STRIP: NEGATIVE
BUN SERPL-MCNC: 13 MG/DL (ref 7–25)
CALCIUM SERPL-MCNC: 9.6 MG/DL (ref 8.6–10.3)
CHLORIDE SERPL-SCNC: 107 MMOL/L (ref 98–107)
CO2 SERPL-SCNC: 25 MMOL/L (ref 21–31)
COLOR UR AUTO: YELLOW
CREAT SERPL-MCNC: 0.74 MG/DL (ref 0.6–1.2)
DIFFERENTIAL METHOD BLD: NORMAL
EOSINOPHIL # BLD AUTO: 0.2 10E9/L (ref 0–0.7)
EOSINOPHIL NFR BLD AUTO: 1.8 %
ERYTHROCYTE [DISTWIDTH] IN BLOOD BY AUTOMATED COUNT: 13.2 % (ref 10–15)
GFR SERPL CREATININE-BSD FRML MDRD: >90 ML/MIN/1.7M2
GLUCOSE SERPL-MCNC: 96 MG/DL (ref 70–105)
GLUCOSE UR STRIP-MCNC: NEGATIVE MG/DL
HCT VFR BLD AUTO: 36.2 % (ref 35–47)
HGB BLD-MCNC: 11.9 G/DL (ref 11.7–15.7)
HGB UR QL STRIP: NEGATIVE
IMM GRANULOCYTES # BLD: 0 10E9/L (ref 0–0.4)
IMM GRANULOCYTES NFR BLD: 0.3 %
KETONES UR STRIP-MCNC: NEGATIVE MG/DL
LACTATE SERPL-SCNC: 0.7 MMOL/L (ref 0.5–2.2)
LEUKOCYTE ESTERASE UR QL STRIP: ABNORMAL
LIPASE SERPL-CCNC: 16 U/L (ref 11–82)
LYMPHOCYTES # BLD AUTO: 3.2 10E9/L (ref 0.8–5.3)
LYMPHOCYTES NFR BLD AUTO: 30.1 %
MCH RBC QN AUTO: 28.8 PG (ref 26.5–33)
MCHC RBC AUTO-ENTMCNC: 32.9 G/DL (ref 31.5–36.5)
MCV RBC AUTO: 88 FL (ref 78–100)
MONOCYTES # BLD AUTO: 0.6 10E9/L (ref 0–1.3)
MONOCYTES NFR BLD AUTO: 5.6 %
NEUTROPHILS # BLD AUTO: 6.5 10E9/L (ref 1.6–8.3)
NEUTROPHILS NFR BLD AUTO: 61.5 %
NITRATE UR QL: NEGATIVE
NON-SQ EPI CELLS #/AREA URNS LPF: ABNORMAL /LPF
PH UR STRIP: 7 PH (ref 5–9)
PLATELET # BLD AUTO: 299 10E9/L (ref 150–450)
POTASSIUM SERPL-SCNC: 4.5 MMOL/L (ref 3.5–5.1)
PROT SERPL-MCNC: 7 G/DL (ref 6.4–8.9)
RBC # BLD AUTO: 4.13 10E12/L (ref 3.8–5.2)
RBC #/AREA URNS AUTO: ABNORMAL /HPF
SODIUM SERPL-SCNC: 139 MMOL/L (ref 134–144)
SOURCE: ABNORMAL
SP GR UR STRIP: 1.02 (ref 1–1.03)
UROBILINOGEN UR STRIP-ACNC: 0.2 EU/DL (ref 0.2–1)
WBC # BLD AUTO: 10.5 10E9/L (ref 4–11)
WBC #/AREA URNS AUTO: ABNORMAL /HPF

## 2018-10-26 PROCEDURE — 84702 CHORIONIC GONADOTROPIN TEST: CPT | Performed by: PHYSICIAN ASSISTANT

## 2018-10-26 PROCEDURE — 83690 ASSAY OF LIPASE: CPT | Performed by: PHYSICIAN ASSISTANT

## 2018-10-26 PROCEDURE — 99283 EMERGENCY DEPT VISIT LOW MDM: CPT | Performed by: PHYSICIAN ASSISTANT

## 2018-10-26 PROCEDURE — 83605 ASSAY OF LACTIC ACID: CPT | Performed by: PHYSICIAN ASSISTANT

## 2018-10-26 PROCEDURE — 81001 URINALYSIS AUTO W/SCOPE: CPT | Performed by: PHYSICIAN ASSISTANT

## 2018-10-26 PROCEDURE — 85025 COMPLETE CBC W/AUTO DIFF WBC: CPT | Performed by: PHYSICIAN ASSISTANT

## 2018-10-26 PROCEDURE — 99283 EMERGENCY DEPT VISIT LOW MDM: CPT | Mod: Z6 | Performed by: PHYSICIAN ASSISTANT

## 2018-10-26 PROCEDURE — 36415 COLL VENOUS BLD VENIPUNCTURE: CPT | Performed by: PHYSICIAN ASSISTANT

## 2018-10-26 PROCEDURE — 80053 COMPREHEN METABOLIC PANEL: CPT | Performed by: PHYSICIAN ASSISTANT

## 2018-10-26 PROCEDURE — 25000132 ZZH RX MED GY IP 250 OP 250 PS 637: Performed by: PHYSICIAN ASSISTANT

## 2018-10-26 RX ORDER — ACETAMINOPHEN 500 MG
500 TABLET ORAL ONCE
Status: COMPLETED | OUTPATIENT
Start: 2018-10-26 | End: 2018-10-26

## 2018-10-26 RX ADMIN — ACETAMINOPHEN 500 MG: 500 TABLET, FILM COATED ORAL at 21:35

## 2018-10-26 ASSESSMENT — ENCOUNTER SYMPTOMS
FEVER: 0
NEUROLOGICAL NEGATIVE: 1
HEMATURIA: 0
DYSURIA: 0
EYES NEGATIVE: 1
ABDOMINAL PAIN: 1
DIARRHEA: 0
ENDOCRINE NEGATIVE: 1
HEMATOLOGIC/LYMPHATIC NEGATIVE: 1
NAUSEA: 0
SHORTNESS OF BREATH: 0
PSYCHIATRIC NEGATIVE: 1
MUSCULOSKELETAL NEGATIVE: 1
VOMITING: 0
ALLERGIC/IMMUNOLOGIC NEGATIVE: 1

## 2018-10-26 NOTE — ED AVS SNAPSHOT
[facility]    [address]    [place] [address]    Phone:  [phone]    Fax:  [phone]                                       Alize Clifton   MRN: [id number]    Department:  [facility]   Date of Visit:  10/26/2018           After Visit Summary Signature Page     I have received my discharge instructions, and my questions have been answered. I have discussed any challenges I see with this plan with the nurse or doctor.    ..........................................................................................................................................  Patient/Patient Representative Signature      ..........................................................................................................................................  Patient Representative Print Name and Relationship to Patient    ..................................................               ................................................  Date                                   Time    ..........................................................................................................................................  Reviewed by Signature/Title    ...................................................              ..............................................  Date                                               Time          22EPIC Rev 08/18

## 2018-10-26 NOTE — ED AVS SNAPSHOT
[facility]    [address]    [place] [address]    Phone:  [phone]    Fax:  [phone]                                       Alize Clifton   MRN: [id number]    Department:  [facility]   Date of Visit:  10/26/2018           Patient Information     Date Of Birth          1995        Your diagnoses for this visit were:     Abdominal cramping affecting pregnancy        You were seen by Kleber Gallagher PA.      Follow-up Information     Follow up with Kisha Hugo.    Specialty:  Family Practice    Why:  As needed    Contact information:    [facility]  [address] [facility] [address]  [phone]          Discharge Instructions         Get plenty of fluids and rest.  You can take Tylenol as needed for discomfort.  Referral will be placed for you to follow-up with your PCP on Monday for a repeat blood test will be taken.  Return to the ED if symptoms worsen such as increased cramping and associated bleeding.    Abdominal Pain and Early Pregnancy    The tests you had show that you are pregnant, but the exact cause of your pain isn t clear.  Some pain and bleeding are common early in pregnancy. Often they stop, and you can go on to have a normal pregnancy and baby. Other times the pain or bleeding can be signs of a miscarriage or ectopic pregnancy. An ectopic pregnancy is a very serious problem. At this time it is unclear if your pregnancy will continue normally, if you will have a miscarriage, or if you could have an ectopic pregnancy. Below is some information about this.  Miscarriage  At this time we don t know whether you will have a miscarriage, or if things will clear up and your pregnancy will continue normally. We understand that this is emotionally difficult. There is little we can say to change the way you feel. But understand that miscarriages are common.  About 1 or 2 out of every 10 pregnancies end this way. Some  end even before you know you are pregnant. This happens for a number of reasons, and usually we never figure out why. It s important you know that it is not your fault. It didn t happen because you did anything wrong.  Having sex or exercising does not cause a miscarriage. These activities are usually safe unless you have pain or bleeding or your doctor tells you to stop. Even minor falls won t cause a miscarriage. Miscarriages happen because things were not developing as they were supposed to. No medicine can prevent a miscarriage.  Ectopic pregnancy  In a normal pregnancy, the fertilized egg attaches to the wall of the womb (uterus). In an ectopic or tubal pregnancy, the fertilized egg attaches outside the uterus, usually in the fallopian tube. Very rarely, the egg attaches to an ovary or somewhere else in the abdomen. An ectopic pregnancy is much less common than a miscarriage, but it is very serious. The baby cannot survive, and as it grows it can rupture the tube. This can cause internal bleeding and even death. Risk factors for an ectopic are:    An ectopic pregnancy in the past    Pelvic inflammatory disease, or PID    Endometriosis    Smoking    An IUD  Additional tests  Because we don t know what s causing your symptoms, you will need more tests to figure out what the problem is. You may need the following.  Ultrasound  An ultrasound can usually find a normal pregnancy as early as 4 to 5 weeks along. If the ultrasound does not show the baby inside the uterus, it means one of the following.    You have a normal pregnancy less than 4 weeks along    You are having or recently had a miscarriage    You have an ectopic pregnancy  Quantitative HCG  This test measures the amount of a pregnancy hormone in your blood. Comparing today's test result to a repeat test in 2 days will show whether you have a normal pregnancy.  Laparoscopy  This is a type of surgery. The healthcare provider will put a tube with a light  inside your belly (abdomen) to look directly at your pelvic organs. This test is used when it is not safe to wait 2 days for blood test results.  Important information  If you do have an ectopic pregnancy, there is a small chance that the growing fetus can tear the fallopian tube. This can cause severe internal bleeding. If this happens, you may have:    Sudden severe pain in your lower abdomen    Vaginal bleeding    Weakness, dizziness, and sometimes fainting  If any of these symptoms occur:    Call 911or return right away to the hospital.    Don't drive yourself.    Don't go to your healthcare provider's office or to a clinic. Go to the hospital.   Home care  Follow these guidelines to help care for yourself at home:    Rest until your next exam. Don t do anything strenuous.    Eat a light diet with foods that are easy to digest.    Don t have sex until your healthcare provider says it s OK.  Follow-up care  Follow up with your healthcare provider, or as advised. If you were told to have a repeat blood test in 2 days, it s important to get it done.  If you had an X-ray or ultrasound, a radiologist will review it. You will be told of any new findings that may affect your care.  Call 911  Call 911 if you have any of these:    Severe pain and very heavy bleeding    Severe lightheadedness, passing out, or fainting    Rapid heart rate    Trouble breathing    Confused or difficulty waking up  When to seek medical advice  Call your healthcare provider right away if any of these occur:    The pain in your abdomen gets worse, either suddenly or gradually.    You are dizzy or weak when you stand.    You have heavy vaginal bleeding. This means soaking 1 pad an hour for 3 hours.    You have vaginal bleeding for more than 5 days.    You have repeated vomiting or diarrhea.    The pain in your abdomen moves to the lower right.    You have blood in your vomit or bowel movements. This will be dark red or black.    You have a fever  of 100.4 F (38 C) or higher, or as directed by your healthcare provider.  Date Last Reviewed: 10/1/2016    4137-7446 [employer]. [address] [place] [address], [place] [address]. All rights reserved. This information is not intended as a substitute for professional medical care. Always follow your healthcare professional's instructions.          24 Hour Appointment Hotline     To schedule an appointment at [facility], please call [phone]. If you don't have a family doctor or clinic, we will help you find one. [facility] are conveniently located to serve the needs of you and your family.           Review of your medicines      Our records show that you are taking the medicines listed below. If these are incorrect, please call your family doctor or clinic.        Dose / Directions Last dose taken    benzonatate 200 MG capsule   Commonly known as:  TESSALON   Dose:  200 mg   Quantity:  21 capsule        Take 1 capsule (200 mg) by mouth 3 times daily as needed for cough   Refills:  0        Prenatal Adult Gummy/DHA/FA 0.4-25 MG Chew   Dose:  1 tablet        Take 1 tablet by mouth   Refills:  0                Procedures and tests performed during your visit     *UA reflex to Microscopic    CBC with platelets differential    Comprehensive metabolic panel    HCG quantitative pregnancy (blood)    Lactic acid    Lipase    Urine Microscopic      Orders Needing Specimen Collection     None      Pending Results     No orders found from 10/24/2018 to 10/27/2018.            Pending Culture Results     No orders found from 10/24/2018 to 10/27/2018.            Pending Results Instructions     If you had any lab results that were not finalized at the time of your Discharge, you can call the ED Lab Result RN at [phone]. You will be contacted by this team for any positive Lab results or changes in treatment. The nurses are available 7 days a week from 10A to 6:30P.  You can leave a message 24 hours per day  "and they will return your call.        Thank you for choosing [facility]       Thank you for choosing [facility] for your care. Our goal is always to provide you with excellent care. Hearing back from our patients is one way we can continue to improve our services. Please take a few minutes to complete the written survey that you may receive in the mail after you visit with us. Thank you!        [facility] Information     [employer] lets you send messages to your doctor, view your test results, renew your prescriptions, schedule appointments and more. To sign up, go to [email or web address] . Click on \"Log in\" on the left side of the screen, which will take you to the Welcome page. Then click on \"Sign up Now\" on the right side of the page.     You will be asked to enter the access code listed below, as well as some personal information. Please follow the directions to create your username and password.     Your access code is: [email or web address]  Expires: 2019 10:00 PM     Your access code will  in 90 days. If you need help or a new code, please call your [facility] clinic or [phone].        Care EveryWhere ID     This is your Care EveryWhere ID. This could be used by other organizations to access your [facility] medical records  [id number]        Equal Access to Services     JEANNIE MENDEZ : Pepe busho Soherve, waaxda luqadaha, qaybta kaalmada adeegyada, shante diallo. So [facility] [phone].    ATENCIÓN: Si habla español, tiene a mireles disposición servicios gratuitos de asistencia lingüística. Llame al [phone].    We comply with applicable federal civil rights laws and [place] laws. We do not discriminate on the basis of race, color, national origin, age, disability, sex, sexual orientation, or gender identity.            After Visit Summary       This is your record. Keep this with you and show to your community pharmacist(s) and doctor(s) at your next visit.                "

## 2018-10-27 NOTE — ED TRIAGE NOTES
Pt comes in c/o right sided abd cramping pain 7/10. Pain started around 1700. Pt is 6 wks gestation. No bleeding.    Sarah Colvin RN on 10/26/2018 at 8:03 PM

## 2018-10-27 NOTE — ED PROVIDER NOTES
History   No chief complaint on file.    HPI  Alize Clifton is a 23 year old female who presents to the ED today with chief complaint of abdominal cramping.  Patient reports that she is currently pregnant believed to be 6 weeks gestation.  Patient reports her last menstrual period was .  Patient is  2 para 0, patient's first pregnancy ended in miscarriage at 12 weeks.  Patient reports abdominal cramping that began around 1700 this evening.  Patient denies any vaginal bleeding.  Patient reports that her cramping is located on the right lower quadrant.  Patient rates pain as a 7 out of 10, and patient has tried no relieving factors.  Patient denies recent fevers or sicknesses.    Problem List:    There are no active problems to display for this patient.       Past Medical History:    No past medical history on file.    Past Surgical History:    No past surgical history on file.    Family History:    No family history on file.    Social History:  Marital Status:  Single [1]  Social History   Substance Use Topics     Smoking status: Current Every Day Smoker     Packs/day: 0.25     Types: Cigarettes     Smokeless tobacco: Never Used     Alcohol use No        Medications:      Prenatal MV & Min w/FA-DHA (PRENATAL ADULT GUMMY/DHA/FA) 0.4-25 MG CHEW   benzonatate (TESSALON) 200 MG capsule         Review of Systems   Constitutional: Negative for fever.   HENT: Negative.    Eyes: Negative.    Respiratory: Negative for shortness of breath.    Cardiovascular: Negative for chest pain.   Gastrointestinal: Positive for abdominal pain. Negative for diarrhea, nausea and vomiting.   Endocrine: Negative.    Genitourinary: Negative for dysuria, hematuria, vaginal bleeding and vaginal pain.   Musculoskeletal: Negative.    Skin: Negative.    Allergic/Immunologic: Negative.    Neurological: Negative.    Hematological: Negative.    Psychiatric/Behavioral: Negative.        Physical Exam   BP: 131/67  Pulse:  "88  Temp: 98.1  F (36.7  C)  Height: 170.2 cm (5' 7\")  Weight: 104.3 kg (230 lb)  SpO2: 100 %      Physical Exam   Constitutional: She is oriented to person, place, and time. She appears well-developed and well-nourished. No distress.   HENT:   Head: Normocephalic and atraumatic.   Eyes: EOM are normal. Pupils are equal, round, and reactive to light.   Neck: Normal range of motion. Neck supple.   Cardiovascular: Normal rate, regular rhythm and normal heart sounds.    No murmur heard.  Pulmonary/Chest: Effort normal and breath sounds normal. No respiratory distress.   Abdominal: Soft. Bowel sounds are normal. She exhibits no distension and no mass. There is tenderness. There is no guarding.   Musculoskeletal: Normal range of motion.   Neurological: She is alert and oriented to person, place, and time.   Skin: Skin is warm. She is not diaphoretic.   Psychiatric: She has a normal mood and affect. Her behavior is normal. Judgment and thought content normal.       ED Course     ED Course     Procedures               Critical Care time:  none               Results for orders placed or performed during the hospital encounter of 10/26/18 (from the past 24 hour(s))   HCG quantitative pregnancy (blood)   Result Value Ref Range    HCG Quantitative Serum 378 IU/L   *UA reflex to Microscopic   Result Value Ref Range    Color Urine Yellow     Appearance Urine Clear     Glucose Urine Negative NEG^Negative mg/dL    Bilirubin Urine Negative NEG^Negative    Ketones Urine Negative NEG^Negative mg/dL    Specific Gravity Urine 1.025 1.000 - 1.030    Blood Urine Negative NEG^Negative    pH Urine 7.0 5.0 - 9.0 pH    Protein Albumin Urine Negative NEG^Negative mg/dL    Urobilinogen Urine 0.2 0.2 - 1.0 EU/dL    Nitrite Urine Negative NEG^Negative    Leukocyte Esterase Urine Small (A) NEG^Negative    Source Midstream Urine    Urine Microscopic   Result Value Ref Range    WBC Urine 0 - 5 OTO5^0 - 5 /HPF    RBC Urine O - 2 OTO2^O - 2 /HPF    " Squamous Epithelial /LPF Urine Moderate (A) FEW^Few /LPF    Bacteria Urine Few (A) NEG^Negative /HPF    Amorphous Crystals Many (A) NEG^Negative /HPF   CBC with platelets differential   Result Value Ref Range    WBC 10.5 4.0 - 11.0 10e9/L    RBC Count 4.13 3.8 - 5.2 10e12/L    Hemoglobin 11.9 11.7 - 15.7 g/dL    Hematocrit 36.2 35.0 - 47.0 %    MCV 88 78 - 100 fl    MCH 28.8 26.5 - 33.0 pg    MCHC 32.9 31.5 - 36.5 g/dL    RDW 13.2 10.0 - 15.0 %    Platelet Count 299 150 - 450 10e9/L    Diff Method Automated Method     % Neutrophils 61.5 %    % Lymphocytes 30.1 %    % Monocytes 5.6 %    % Eosinophils 1.8 %    % Basophils 0.7 %    % Immature Granulocytes 0.3 %    Absolute Neutrophil 6.5 1.6 - 8.3 10e9/L    Absolute Lymphocytes 3.2 0.8 - 5.3 10e9/L    Absolute Monocytes 0.6 0.0 - 1.3 10e9/L    Absolute Eosinophils 0.2 0.0 - 0.7 10e9/L    Absolute Basophils 0.1 0.0 - 0.2 10e9/L    Abs Immature Granulocytes 0.0 0 - 0.4 10e9/L   Comprehensive metabolic panel   Result Value Ref Range    Sodium 139 134 - 144 mmol/L    Potassium 4.5 3.5 - 5.1 mmol/L    Chloride 107 98 - 107 mmol/L    Carbon Dioxide 25 21 - 31 mmol/L    Anion Gap 7 3 - 14 mmol/L    Glucose 96 70 - 105 mg/dL    Urea Nitrogen 13 7 - 25 mg/dL    Creatinine 0.74 0.60 - 1.20 mg/dL    GFR Estimate >90 >60 mL/min/1.7m2    GFR Estimate If Black >90 >60 mL/min/1.7m2    Calcium 9.6 8.6 - 10.3 mg/dL    Bilirubin Total 0.3 0.3 - 1.0 mg/dL    Albumin 4.0 3.5 - 5.7 g/dL    Protein Total 7.0 6.4 - 8.9 g/dL    Alkaline Phosphatase 49 34 - 104 U/L    ALT 7 7 - 52 U/L    AST 9 (L) 13 - 39 U/L   Lipase   Result Value Ref Range    Lipase 16 11 - 82 U/L   Lactic acid   Result Value Ref Range    Lactic Acid 0.7 0.5 - 2.2 mmol/L       Medications   acetaminophen (TYLENOL) tablet 500 mg (500 mg Oral Given 10/26/18 2135)       Assessments & Plan (with Medical Decision Making)   Patient seen and examined.  Patient is nontoxic-appearing in no acute distress.  Heart, lung, bowel sounds  normal.  Abdomen soft but tender to palpation on right lower quadrant.  Patient will have a quantitative beta-hCG as well as basic labs and urinalysis.    Beta-hC  WBC 10.5 normal LFTs normal urine    I did keep my differential broad and considered possibility of appendicitis, gallbladder disease, ovarian torsion, PID.  However, patient does not have any peritoneal signs and vital signs and lab work did not support those diagnoses.    Dr. Freeman Solitario, OB, was consulted.  It was recommended patient have referral placed to follow-up on Monday for a repeat hCG.  I discussed results with the patient and explained her the possibility of a possible miscarriage.  Strict return precautions were given including return to the ED if you develop bleeding as well as intractable pain.  Patient was told she could take Tylenol for discomfort.  Patient understood and agreed with the plan and patient was discharged.    Kleber Gallagher PA-C    I have reviewed the nursing notes.    I have reviewed the findings, diagnosis, plan and need for follow up with the patient.       Discharge Medication List as of 10/26/2018 10:00 PM          Final diagnoses:   Abdominal cramping affecting pregnancy       10/26/2018   [facility]     Kleber Gallagher PA  10/26/18 [id number]

## 2018-10-27 NOTE — DISCHARGE INSTRUCTIONS
Get plenty of fluids and rest.  You can take Tylenol as needed for discomfort.  Referral will be placed for you to follow-up with your PCP on Monday for a repeat blood test will be taken.  Return to the ED if symptoms worsen such as increased cramping and associated bleeding.    Abdominal Pain and Early Pregnancy    The tests you had show that you are pregnant, but the exact cause of your pain isn t clear.  Some pain and bleeding are common early in pregnancy. Often they stop, and you can go on to have a normal pregnancy and baby. Other times the pain or bleeding can be signs of a miscarriage or ectopic pregnancy. An ectopic pregnancy is a very serious problem. At this time it is unclear if your pregnancy will continue normally, if you will have a miscarriage, or if you could have an ectopic pregnancy. Below is some information about this.  Miscarriage  At this time we don t know whether you will have a miscarriage, or if things will clear up and your pregnancy will continue normally. We understand that this is emotionally difficult. There is little we can say to change the way you feel. But understand that miscarriages are common.  About 1 or 2 out of every 10 pregnancies end this way. Some end even before you know you are pregnant. This happens for a number of reasons, and usually we never figure out why. It s important you know that it is not your fault. It didn t happen because you did anything wrong.  Having sex or exercising does not cause a miscarriage. These activities are usually safe unless you have pain or bleeding or your doctor tells you to stop. Even minor falls won t cause a miscarriage. Miscarriages happen because things were not developing as they were supposed to. No medicine can prevent a miscarriage.  Ectopic pregnancy  In a normal pregnancy, the fertilized egg attaches to the wall of the womb (uterus). In an ectopic or tubal pregnancy, the fertilized egg attaches outside the uterus, usually in  the fallopian tube. Very rarely, the egg attaches to an ovary or somewhere else in the abdomen. An ectopic pregnancy is much less common than a miscarriage, but it is very serious. The baby cannot survive, and as it grows it can rupture the tube. This can cause internal bleeding and even death. Risk factors for an ectopic are:    An ectopic pregnancy in the past    Pelvic inflammatory disease, or PID    Endometriosis    Smoking    An IUD  Additional tests  Because we don t know what s causing your symptoms, you will need more tests to figure out what the problem is. You may need the following.  Ultrasound  An ultrasound can usually find a normal pregnancy as early as 4 to 5 weeks along. If the ultrasound does not show the baby inside the uterus, it means one of the following.    You have a normal pregnancy less than 4 weeks along    You are having or recently had a miscarriage    You have an ectopic pregnancy  Quantitative HCG  This test measures the amount of a pregnancy hormone in your blood. Comparing today's test result to a repeat test in 2 days will show whether you have a normal pregnancy.  Laparoscopy  This is a type of surgery. The healthcare provider will put a tube with a light inside your belly (abdomen) to look directly at your pelvic organs. This test is used when it is not safe to wait 2 days for blood test results.  Important information  If you do have an ectopic pregnancy, there is a small chance that the growing fetus can tear the fallopian tube. This can cause severe internal bleeding. If this happens, you may have:    Sudden severe pain in your lower abdomen    Vaginal bleeding    Weakness, dizziness, and sometimes fainting  If any of these symptoms occur:    Call 911or return right away to the hospital.    Don't drive yourself.    Don't go to your healthcare provider's office or to a clinic. Go to the hospital.   Home care  Follow these guidelines to help care for yourself at home:    Rest  until your next exam. Don t do anything strenuous.    Eat a light diet with foods that are easy to digest.    Don t have sex until your healthcare provider says it s OK.  Follow-up care  Follow up with your healthcare provider, or as advised. If you were told to have a repeat blood test in 2 days, it s important to get it done.  If you had an X-ray or ultrasound, a radiologist will review it. You will be told of any new findings that may affect your care.  Call 911  Call 911 if you have any of these:    Severe pain and very heavy bleeding    Severe lightheadedness, passing out, or fainting    Rapid heart rate    Trouble breathing    Confused or difficulty waking up  When to seek medical advice  Call your healthcare provider right away if any of these occur:    The pain in your abdomen gets worse, either suddenly or gradually.    You are dizzy or weak when you stand.    You have heavy vaginal bleeding. This means soaking 1 pad an hour for 3 hours.    You have vaginal bleeding for more than 5 days.    You have repeated vomiting or diarrhea.    The pain in your abdomen moves to the lower right.    You have blood in your vomit or bowel movements. This will be dark red or black.    You have a fever of 100.4 F (38 C) or higher, or as directed by your healthcare provider.  Date Last Reviewed: 10/1/2016    6239-1304 [employer]. [address] [place] [address], [place] [address]. All rights reserved. This information is not intended as a substitute for professional medical care. Always follow your healthcare professional's instructions.

## 2018-10-29 ENCOUNTER — OFFICE VISIT (OUTPATIENT)
Dept: FAMILY MEDICINE | Facility: OTHER | Age: 23
End: 2018-10-29
Attending: FAMILY MEDICINE
Payer: COMMERCIAL

## 2018-10-29 VITALS
WEIGHT: 228 LBS | BODY MASS INDEX: 35.79 KG/M2 | HEIGHT: 67 IN | TEMPERATURE: 98.7 F | HEART RATE: 88 BPM | SYSTOLIC BLOOD PRESSURE: 122 MMHG | RESPIRATION RATE: 20 BRPM | DIASTOLIC BLOOD PRESSURE: 80 MMHG

## 2018-10-29 DIAGNOSIS — Z34.90 PREGNANCY, UNSPECIFIED GESTATIONAL AGE: Primary | ICD-10-CM

## 2018-10-29 DIAGNOSIS — O26.899 CRAMPING AFFECTING PREGNANCY, ANTEPARTUM: Primary | ICD-10-CM

## 2018-10-29 DIAGNOSIS — R10.9 CRAMPING AFFECTING PREGNANCY, ANTEPARTUM: Primary | ICD-10-CM

## 2018-10-29 LAB — B-HCG SERPL-ACNC: 1398 IU/L

## 2018-10-29 PROCEDURE — 84702 CHORIONIC GONADOTROPIN TEST: CPT | Performed by: FAMILY MEDICINE

## 2018-10-29 PROCEDURE — 99213 OFFICE O/P EST LOW 20 MIN: CPT | Performed by: FAMILY MEDICINE

## 2018-10-29 PROCEDURE — 36415 COLL VENOUS BLD VENIPUNCTURE: CPT | Performed by: FAMILY MEDICINE

## 2018-10-29 PROCEDURE — G0463 HOSPITAL OUTPT CLINIC VISIT: HCPCS

## 2018-10-29 ASSESSMENT — PAIN SCALES - GENERAL: PAINLEVEL: MILD PAIN (2)

## 2018-10-29 ASSESSMENT — ENCOUNTER SYMPTOMS
VOMITING: 1
FATIGUE: 0
NAUSEA: 1
FREQUENCY: 0

## 2018-10-29 NOTE — PROGRESS NOTES
SUBJECTIVE:   There are no exam notes on file for this visit.    Alize Clifton is a 23 year old female who presents to clinic today for follow up of ER visit from 3 days ago.  She is newly pregnant.  Last menstrual period was 18, making her 6w4d with an LINN of 19.  She went to the ER due to complaint of cramping.  She has had some right sided pelvic pain.  No bleeding.  Normal bowel movements.  Normal urination.  Patient is  2 para 0, patient's first pregnancy ended in spontaneous miscarriage at 12 weeks.  She did not need any D&C related to this.  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  Beta HCG on 10/26/18 was 378.      HPI    I personally reviewed medications/allergies/history listed below:    Patient Active Problem List    Diagnosis Date Noted     SAB (spontaneous ) 2016     Priority: Medium     HPV vaccine counseling 2016     Priority: Medium     Supervision of normal pregnancy in first trimester 2016     Priority: Medium     Contraceptive management 2015     Priority: Medium     TMJ pain dysfunction syndrome 2014     Priority: Medium     Abdominal pain, generalized 10/14/2009     Priority: Medium     Overview:   IMO Update 10/11       Allergic state 2007     Priority: Medium     Overview:   IMO Update 10/11       History reviewed. No pertinent past medical history.   History reviewed. No pertinent surgical history.  History reviewed. No pertinent family history.  Social History   Substance Use Topics     Smoking status: Current Every Day Smoker     Packs/day: 0.25     Types: Cigarettes     Smokeless tobacco: Never Used     Alcohol use No     Social History     Social History Narrative    Preload  10/7/2013     Current Outpatient Prescriptions   Medication Sig Dispense Refill     Prenatal MV & Min w/FA-DHA (PRENATAL ADULT GUMMY/DHA/FA) 0.4-25 MG CHEW Take 1 tablet by mouth       Allergies   Allergen Reactions     Azithromycin Rash       Review of  "Systems   Constitutional: Negative for fatigue.   Gastrointestinal: Positive for nausea and vomiting (last 3 days ago).   Breasts:  Positive for tenderness.   Genitourinary: Positive for pelvic pain. Negative for frequency and vaginal bleeding.        OBJECTIVE:     /80 (BP Location: Right arm, Patient Position: Sitting, Cuff Size: Adult Large)  Pulse 88  Temp 98.7  F (37.1  C) (Tympanic)  Resp 20  Ht 5' 7\" (1.702 m)  Wt 228 lb (103.4 kg)  LMP 09/13/2018  BMI 35.71 kg/m2  Body mass index is 35.71 kg/(m^2).  Physical Exam   Constitutional: She appears well-developed.   HENT:   Head: Normocephalic.   Eyes: Pupils are equal, round, and reactive to light.   Cardiovascular: Normal rate, regular rhythm and normal heart sounds.    No murmur heard.  Pulmonary/Chest: Effort normal and breath sounds normal. No respiratory distress. She has no wheezes. She has no rales.   Abdominal: Soft. Bowel sounds are normal. She exhibits no distension and no mass. There is tenderness (Very mild right pelvic tenderness.). There is no rebound and no guarding.         PHQ-2 Score:     PHQ-2 ( 1999 Pfizer) 10/29/2018 9/14/2018   Q1: Little interest or pleasure in doing things 0 0   Q2: Feeling down, depressed or hopeless 0 0   PHQ-2 Score 0 0       I personally reviewed results withpatient as listed below:   Diagnostic Test Results:  Results for orders placed or performed in visit on 10/29/18   HCG Quantitative Pregnancy, Blood (XWC153)   Result Value Ref Range    HCG Quantitative Serum 1398 IU/L        ASSESSMENT/PLAN:       ICD-10-CM    1. Cramping affecting pregnancy, antepartum O26.899 HCG Quantitative Pregnancy, Blood (QPA629)    R10.9        1.  Beta HCG repeated today with results as noted above.  Her quant HCG has quadrupled over the past 4 days, which is reassuring.  Also no bleeding to suggest ectopic pregnancy.  No bleeding to suggest SAB.  Recommend repeat beta HCG in 3 days to ensure it continues to rise.  Recommend " ultrasound in approximately 2 weeks to confirm dates/viability.  Likely too early to see much on ultrasound at this time.  Discussed follow up emergently if worsening pelvic pain, heavy bleeding or any other concerning symptoms.  She is already on a prenatal vitamin.  If all is going well, she should establish with first OB physical with her primary care provider in the next few weeks.    Marisabel Terry MD  [facility] AND [facility]

## 2018-10-29 NOTE — MR AVS SNAPSHOT
"              After Visit Summary   10/29/2018    Alize Clifton    MRN: [id number]           Patient Information     Date Of Birth          1995        Visit Information        Provider Department      10/29/2018 11:15 AM Marisabel Terry MD [facility]        Today's Diagnoses     Cramping affecting pregnancy, antepartum    -  1       Follow-ups after your visit        Who to contact     If you have questions or need follow up information about today's clinic visit or your schedule please contact [facility] directly at [phone].  Normal or non-critical lab and imaging results will be communicated to you by Shust[employer], letter or phone within 4 business days after the clinic has received the results. If you do not hear from us within 7 days, please contact the clinic through Shustirhart or phone. If you have a critical or abnormal lab result, we will notify you by phone as soon as possible.  Submit refill requests through [employer] or call your pharmacy and they will forward the refill request to us. Please allow 3 business days for your refill to be completed.          Additional Information About Your Visit        MyChart Information     [employer] lets you send messages to your doctor, view your test results, renew your prescriptions, schedule appointments and more. To sign up, go to [email or web address] . Click on \"Log in\" on the left side of the screen, which will take you to the Welcome page. Then click on \"Sign up Now\" on the right side of the page.     You will be asked to enter the access code listed below, as well as some personal information. Please follow the directions to create your username and password.     Your access code is: [email or web address]  Expires: 2019 10:00 PM     Your access code will  in 90 days. If you need help or a new code, please call your [place] clinic or [phone].        Care EveryWhere ID     This is your Care " "EveryWhere ID. This could be used by other organizations to access your [facility] medical records  [id number]        Your Vitals Were     Pulse Temperature Respirations Height Last Period BMI (Body Mass Index)    88 98.7  F (37.1  C) (Tympanic) 20 5' 7\" (1.702 m) 09/13/2018 35.71 kg/m2       Blood Pressure from Last 3 Encounters:   10/29/18 122/80   10/26/18 131/67   09/14/18 128/70    Weight from Last 3 Encounters:   10/29/18 228 lb (103.4 kg)   10/26/18 230 lb (104.3 kg)   09/14/18 229 lb 4.8 oz (104 kg)              We Performed the Following     HCG Quantitative Pregnancy, Blood ([id number])        Primary Care Provider Office Phone # Fax #    Kisha Hugo [phone]       [facility] [address] [facility] [address]        Equal Access to Services     KEYONA MENDEZ : Hadii aad ku hadasho Soherve, waaxda luqadaha, qaybta kaalmada adeegyada, shante cortez . So [facility] [phone].    ATENCIÓN: Si daola gary, tiene a mireles disposición servicios gratuitos de asistencia lingüística. Llame al [phone].    We comply with applicable federal civil rights laws and [place] laws. We do not discriminate on the basis of race, color, national origin, age, disability, sex, sexual orientation, or gender identity.            Thank you!     Thank you for choosing [facility] AND \[facility]\""  for your care. Our goal is always to provide you with excellent care. Hearing back from our patients is one way we can continue to improve our services. Please take a few minutes to complete the written survey that you may receive in the mail after your visit with us. Thank you!             Your Updated Medication List - Protect others around you: Learn how to safely use, store and throw away your medicines at www.disposemymeds.org.          This list is accurate as of 10/29/18 11:47 AM.  Always use your most recent med list.                   Brand Name Dispense Instructions for use " Diagnosis    Prenatal Adult Gummy/DHA/FA 0.4-25 MG Chew      Take 1 tablet by mouth

## 2018-10-29 NOTE — NURSING NOTE
"Chief Complaint   Patient presents with     RECHECK     ER follow up / beta hcg        Initial /80 (BP Location: Right arm, Patient Position: Sitting, Cuff Size: Adult Large)  Pulse 88  Temp 98.7  F (37.1  C) (Tympanic)  Resp 20  Ht 5' 7\" (1.702 m)  Wt 228 lb (103.4 kg)  LMP 09/13/2018  BMI 35.71 kg/m2 Estimated body mass index is 35.71 kg/(m^2) as calculated from the following:    Height as of this encounter: 5' 7\" (1.702 m).    Weight as of this encounter: 228 lb (103.4 kg).  Medication Reconciliation: complete    Gabrielle Tijerina LPN  "

## 2018-10-30 ENCOUNTER — HOSPITAL ENCOUNTER (EMERGENCY)
Facility: OTHER | Age: 23
Discharge: HOME | End: 2018-10-30
Attending: FAMILY MEDICINE | Admitting: FAMILY MEDICINE
Payer: COMMERCIAL

## 2018-10-30 ENCOUNTER — TELEPHONE (OUTPATIENT)
Dept: FAMILY MEDICINE | Facility: CLINIC | Age: 23
End: 2018-10-30

## 2018-10-30 VITALS
WEIGHT: 228 LBS | BODY MASS INDEX: 35.79 KG/M2 | DIASTOLIC BLOOD PRESSURE: 57 MMHG | HEART RATE: 67 BPM | HEIGHT: 67 IN | TEMPERATURE: 98.5 F | SYSTOLIC BLOOD PRESSURE: 106 MMHG | RESPIRATION RATE: 12 BRPM | OXYGEN SATURATION: 100 %

## 2018-10-30 DIAGNOSIS — Z34.90 EARLY STAGE OF PREGNANCY: ICD-10-CM

## 2018-10-30 DIAGNOSIS — R19.7 DIARRHEA, UNSPECIFIED TYPE: ICD-10-CM

## 2018-10-30 LAB
ALBUMIN UR-MCNC: NEGATIVE MG/DL
AMORPH CRY #/AREA URNS HPF: ABNORMAL /HPF
APPEARANCE UR: ABNORMAL
BACTERIA #/AREA URNS HPF: ABNORMAL /HPF
BILIRUB UR QL STRIP: NEGATIVE
COLOR UR AUTO: YELLOW
GLUCOSE UR STRIP-MCNC: NEGATIVE MG/DL
HGB UR QL STRIP: NEGATIVE
KETONES UR STRIP-MCNC: NEGATIVE MG/DL
LEUKOCYTE ESTERASE UR QL STRIP: ABNORMAL
NITRATE UR QL: NEGATIVE
NON-SQ EPI CELLS #/AREA URNS LPF: ABNORMAL /LPF
PH UR STRIP: 8.5 PH (ref 5–9)
RBC #/AREA URNS AUTO: ABNORMAL /HPF
SOURCE: ABNORMAL
SP GR UR STRIP: 1.01 (ref 1–1.03)
UROBILINOGEN UR STRIP-ACNC: 0.2 EU/DL (ref 0.2–1)
WBC #/AREA URNS AUTO: ABNORMAL /HPF

## 2018-10-30 PROCEDURE — 87086 URINE CULTURE/COLONY COUNT: CPT | Performed by: FAMILY MEDICINE

## 2018-10-30 PROCEDURE — 99283 EMERGENCY DEPT VISIT LOW MDM: CPT | Performed by: FAMILY MEDICINE

## 2018-10-30 PROCEDURE — 99283 EMERGENCY DEPT VISIT LOW MDM: CPT | Mod: Z6 | Performed by: FAMILY MEDICINE

## 2018-10-30 PROCEDURE — 81001 URINALYSIS AUTO W/SCOPE: CPT | Performed by: FAMILY MEDICINE

## 2018-10-30 ASSESSMENT — ENCOUNTER SYMPTOMS
APPETITE CHANGE: 0
RESPIRATORY NEGATIVE: 1
NERVOUS/ANXIOUS: 1
BLOOD IN STOOL: 0
ACTIVITY CHANGE: 0
FATIGUE: 1
MUSCULOSKELETAL NEGATIVE: 1
EYES NEGATIVE: 1
DYSURIA: 0
ABDOMINAL PAIN: 1
CHILLS: 0
CONSTIPATION: 1
ANAL BLEEDING: 0
DIAPHORESIS: 0
FEVER: 0
CARDIOVASCULAR NEGATIVE: 1
ABDOMINAL DISTENTION: 0
DIARRHEA: 1

## 2018-10-30 NOTE — ED AVS SNAPSHOT
[facility]    [address]    [place] [address]    Phone:  [phone]    Fax:  [phone]                                       Alize Clifton   MRN: [id number]    Department:  [facility]   Date of Visit:  10/30/2018           Patient Information     Date Of Birth          1995        Your diagnoses for this visit were:     Diarrhea, unspecified type     Early stage of pregnancy        You were seen by Delbert Lux MD.      Follow-up Information     Follow up with Marisabel Terry MD. Schedule an appointment as soon as possible for a visit in 2 days.    Specialty:  Family Practice    Why:  Follow up for blood work tomorrow or Thursday.  Recheck in clinic after that if symptoms still present.    Contact information:    [address] [place] [address] [address]  [place] [address]  [phone]          Discharge Instructions       Alize,  It was nice to meet you.  As we talked, I think you are having cramping with your diarrhea and it would be good to take a few days to get over this illness.      In addition, you have concerns about your early pregnancy because of your past history that needs monitoring and I would like you to have repeat blood work tomorrow or Thursday to recheck your hormone of pregnancy as well as document your blood type and Rh status.  Recheck in clinic this week if you continue to have stomach upset/discomfort.      Long term, I think it would be good to change your bowel habits a bit by increasing the amount of water you drink (try to drink 6-8 glasses of water each day), giving yourself time after meals for a gentle BM, and eating low on the food chain (eat mostly plants and avoid high fat foods).  Our goal will be at least one gentle BM each day.    Recheck in clinic this week.    Dr. Matthew Lux       Discharge References/Attachments     PREGNANCY, ADAPTING TO: FIRST TRIMESTER (ENGLISH)    DIARRHEA, UNKNOWN CAUSE  (ENGLISH)    IBS, WHAT IS (ENGLISH)      Your next 10 appointments already scheduled     Nov 12, 2018 10:30 AM CST   US OB < 14 WEEKS SINGLE with GHUS1   [facility] ([facility])    [address]  [place] [address]   [phone]           How do I prepare for my exam? (Food and drink instructions) Drink four 8-ounce glasses of fluid an hour before your exam. If you need to empty your bladder before your exam, try to release only a little urine. Then, drink another glass of fluid.  How do I prepare for my exam? (Other instructions) You may have up to two family members in the exam room. If you bring a small child, an adult must be there to care for him or her. No video or camera photography during the procedure.  What should I wear: Wear comfortable clothes.  How long does the exam take: Most ultrasounds take 30 to 60 minutes.  What should I bring: Bring a list of your medicines, including vitamins, minerals and over-the-counter drugs. It is safest to leave personal items at home.  Do I need a :  No  is needed.  What do I need to tell my doctor: Tell your doctor about any allergies you may have.  What should I do after the exam: No restrictions, You may resume normal activities.  What is this test: An ultrasound uses sound waves to make pictures of the body. Sound waves do not cause pain. The only discomfort may be the pressure of the wand against your skin or full bladder.  Who should I call with questions: If you have any questions, please call the Imaging Department where you will have your exam. Directions, parking instructions, and other information is available on our website, [email or web address].              24 Hour Appointment Hotline     To schedule an appointment at [facility], please call [phone]. If you don't have a family doctor or clinic, we will help you find one. [facility] are conveniently located to serve the needs  "of you and your family.        ED Discharge Orders     ABO and Rh           HCG quantitative pregnancy                    Review of your medicines      Our records show that you are taking the medicines listed below. If these are incorrect, please call your family doctor or clinic.        Dose / Directions Last dose taken    Prenatal Adult Gummy/DHA/FA 0.4-25 MG Chew   Dose:  1 tablet        Take 1 tablet by mouth   Refills:  0                Procedures and tests performed during your visit     UA reflex to Microscopic and Culture    Urine Microscopic      Orders Needing Specimen Collection     None      Pending Results     No orders found from 10/28/2018 to 10/31/2018.            Pending Culture Results     No orders found from 10/28/2018 to 10/31/2018.            Pending Results Instructions     If you had any lab results that were not finalized at the time of your Discharge, you can call the ED Lab Result RN at [phone]. You will be contacted by this team for any positive Lab results or changes in treatment. The nurses are available 7 days a week from 10A to 6:30P.  You can leave a message 24 hours per day and they will return your call.        Thank you for choosing [facility]       Thank you for choosing [facility] for your care. Our goal is always to provide you with excellent care. Hearing back from our patients is one way we can continue to improve our services. Please take a few minutes to complete the written survey that you may receive in the mail after you visit with us. Thank you!        [employer] Information     [employer] lets you send messages to your doctor, view your test results, renew your prescriptions, schedule appointments and more. To sign up, go to [email or web address] . Click on \"Log in\" on the left side of the screen, which will take you to the Welcome page. Then click on \"Sign up Now\" on the right side of the page.     You will be asked to enter the access code listed below, as well as " some personal information. Please follow the directions to create your username and password.     Your access code is: [email or web address]  Expires: 2019 10:00 PM     Your access code will  in 90 days. If you need help or a new code, please call your [place] clinic or [phone].        Care EveryWhere ID     This is your Care EveryWhere ID. This could be used by other organizations to access your [place] medical records  [id number]        Equal Access to Services     [facility] CenterANDREW : Hadalicia busho Soherve, waaxda luqadaha, qaybta kaalmada adejeovanyyaaishwarya, shante cortez . So [facility] [phone].    ATENCIÓN: Si habla español, tiene a mireles disposición servicios gratuitos de asistencia lingüística. Llame al [phone].    We comply with applicable federal civil rights laws and [place] laws. We do not discriminate on the basis of race, color, national origin, age, disability, sex, sexual orientation, or gender identity.            After Visit Summary       This is your record. Keep this with you and show to your community pharmacist(s) and doctor(s) at your next visit.

## 2018-10-30 NOTE — LETTER
October 30, 2018      To Whom It May Concern:      Alize Clifton was seen in our Emergency Department today, 10/30/18 for an illness that will keep her from school and work for the next few days.  I expect her condition to improve over the next 3-4 days.  She may return to work/school when improved.    Sincerely,        Delbert Lux MD

## 2018-10-30 NOTE — TELEPHONE ENCOUNTER
Patient seen in ER today and having Lab done tomorrow. Wondering if she needs to meet with Freeman Solitario to go over lab results or if we can do it over the phone. Tried to schedule follow up but no openings in next few days. Dr. Lux wanted her to follow up in 2-3 days. Thank you.

## 2018-10-30 NOTE — ED TRIAGE NOTES
"ED Nursing Triage Note (General)   ________________________________    Alize Clifton is a 23 year old Female that presents to triage private car  With history of  Low back pain and lower abdominal cramping, is about 5 weeks pregnant, was in the ED a few days ago and was seen by PCP after her visit here, this is her second pregnancy with a hx of SAB with first pregnancy, denies vaginal discharge or bleeding, reported by patient   Significant symptoms had onset 5 day(s) ago.  /71  Pulse 95  Temp 98.5  F (36.9  C) (Tympanic)  Resp 12  Ht 1.702 m (5' 7\")  Wt 103.4 kg (228 lb)  LMP 09/13/2018  SpO2 100%  Breastfeeding? No  BMI 35.71 kg/m2t  Patient appears alert , in no acute distress., and cooperative behavior.  Anxiety: appears very anxious about pregnancy, feels she needs to be on bedrest  GCS 15  Airway: intact  Breathing noted as Normal.  Circulation Normal  Skin normal  Action taken:  Triage to critical care immediately      PRE HOSPITAL PRIOR LIVING SITUATION Spouse    COLUMBIA-SUICIDE SEVERITY RATING SCALE   Screen with Triage Points for Emergency Department      Ask questions that are bolded and underlined.   Past  month   Ask Questions 1 and 2 YES NO   1)  Have you wished you were dead or wished you could go to sleep and not wake up?   x   2)  Have you actually had any thoughts of killing yourself?   x   If YES to 2, ask questions 3, 4, 5, and 6.  If NO to 2, go directly to question 6.   3)  Have you been thinking about how you might do this?   E.g.  I thought about taking an overdose but I never made a specific plan as to when where or how I would actually do it .and I would never go through with it.       4)  Have you had these thoughts and had some intention of acting on them?   As opposed to  I have the thoughts but I definitely will not do anything about them.       5)  Have you started to work out or worked out the details of how to kill yourself? Do you intend to carry out this plan?  "     6)  Have you ever done anything, started to do anything, or prepared to do anything to end your life?  Examples: Collected pills, obtained a gun, gave away valuables, wrote a will or suicide note, took out pills but didn t swallow any, held a gun but changed your mind or it was grabbed from your hand, went to the roof but didn t jump; or actually took pills, tried to shoot yourself, cut yourself, tried to hang yourself, etc.    If YES, ask: Was this within the past three months?  Lifetime     x    Past 3 Months        Item 1:  Behavioral Health Referral at Discharge  Item 2:  Behavioral Health Referral at Discharge   Item 3:  Behavioral Health Consult (Psychiatric Nurse/) and consider Patient Safety Precautions  Item 4:  Immediate Notification of Physician and/or Behavioral Health and Patient Safety Precautions   Item 5:  Immediate Notification of Physician and/or Behavioral Health and Patient Safety Precautions  Item 6:  Over 3 months ago: Behavioral Health Consult (Psychiatric Nurse/) and consider Patient Safety Precautions  OR  Item 6:  3 months ago or less: Immediate Notification of Physician and/or Behavioral Health and Patient Safety Precautions

## 2018-10-30 NOTE — TELEPHONE ENCOUNTER
As long as she is stable, ok to follow up with labs over the phone.  Other alternative would be to see one of the OB GYNs if they have an opening.  Marisabel Terry MD on 10/30/2018 at 9:54 AM

## 2018-10-30 NOTE — DISCHARGE INSTRUCTIONS
Alize,  It was nice to meet you.  As we talked, I think you are having cramping with your diarrhea and it would be good to take a few days to get over this illness.      In addition, you have concerns about your early pregnancy because of your past history that needs monitoring and I would like you to have repeat blood work tomorrow or Thursday to recheck your hormone of pregnancy as well as document your blood type and Rh status.  Recheck in clinic this week if you continue to have stomach upset/discomfort.      Long term, I think it would be good to change your bowel habits a bit by increasing the amount of water you drink (try to drink 6-8 glasses of water each day), giving yourself time after meals for a gentle BM, and eating low on the food chain (eat mostly plants and avoid high fat foods).  Our goal will be at least one gentle BM each day.    Recheck in clinic this week.    Dr. Matthew Lux

## 2018-10-30 NOTE — ED PROVIDER NOTES
History     Chief Complaint   Patient presents with     Back Pain     Abdominal Pain     HPI  Alize Clifton is a 23 year old female  at approximately 6-7 weeks EGA who had a previous early miscarriage and who is very concerned about having another miscarriage.  She had diarrhea yesterday with some cramping.  Less but persistent migrating stomach cramps today.  Hx of 1 BM every 3-4 days and limited water intake.  She has not had any vaginal bleeding/spotting or vaginal discharge.  She would like time off work as she is stressed think about miscarriage risks.  She is quitting smoking.  She hasn't established with a PMD and didn't do any prepregnancy counseling in clinic.  She had quantitative BHCG yesterday that was 3 times higher than her previous result 10/26.    Problem List:    Patient Active Problem List    Diagnosis Date Noted     SAB (spontaneous ) 2016     Priority: Medium     HPV vaccine counseling 2016     Priority: Medium     Supervision of normal pregnancy in first trimester 2016     Priority: Medium     Contraceptive management 2015     Priority: Medium     TMJ pain dysfunction syndrome 2014     Priority: Medium     Abdominal pain, generalized 10/14/2009     Priority: Medium     Overview:   IMO Update 10/11       Allergic state 2007     Priority: Medium     Overview:   IMO Update 10/11          Past Medical History:    History reviewed. No pertinent past medical history.    Past Surgical History:    History reviewed. No pertinent surgical history.    Family History:    No family history on file.    Social History:  Marital Status:  Single [1]  Social History   Substance Use Topics     Smoking status: Current Every Day Smoker     Packs/day: 0.25     Types: Cigarettes     Smokeless tobacco: Never Used     Alcohol use No        Medications:      Prenatal MV & Min w/FA-DHA (PRENATAL ADULT GUMMY/DHA/FA) 0.4-25 MG CHEW         Review of Systems  "  Constitutional: Positive for fatigue. Negative for activity change, appetite change, chills, diaphoresis and fever.   HENT: Negative.    Eyes: Negative.    Respiratory: Negative.    Cardiovascular: Negative.    Gastrointestinal: Positive for abdominal pain (pain had been right sided last night and now left sided.), constipation and diarrhea. Negative for abdominal distention, anal bleeding and blood in stool.   Genitourinary: Negative for dysuria, vaginal bleeding, vaginal discharge and vaginal pain.   Musculoskeletal: Negative.    Skin: Negative.    Psychiatric/Behavioral: The patient is nervous/anxious.        Physical Exam   BP: 121/71  Pulse: 95  Temp: 98.5  F (36.9  C)  Resp: 12  Height: 170.2 cm (5' 7\")  Weight: 103.4 kg (228 lb)  SpO2: 100 %      Physical Exam   Constitutional: She appears well-developed. She appears distressed.   Anxious 23 year old female in no physical distress.    HENT:   Head: Normocephalic and atraumatic.   Right Ear: External ear normal.   Left Ear: External ear normal.   Nose: Nose normal.   Mouth/Throat: Oropharynx is clear and moist.   Eyes: Conjunctivae and EOM are normal. Pupils are equal, round, and reactive to light. Right eye exhibits no discharge. Left eye exhibits no discharge. No scleral icterus.   Neck: Normal range of motion. Neck supple. No tracheal deviation present. No thyromegaly present.   Cardiovascular: Normal rate, regular rhythm and normal heart sounds.    No murmur heard.  Pulmonary/Chest: Effort normal and breath sounds normal. No respiratory distress.   Abdominal: Soft. Bowel sounds are normal. She exhibits no distension. There is tenderness. There is no rebound and no guarding.   Mild LLQ discomfort but no suprapubic tenderness.   Musculoskeletal: Normal range of motion. She exhibits no edema, tenderness or deformity.   Lymphadenopathy:     She has no cervical adenopathy.   Neurological: She is alert. She exhibits normal muscle tone.   Skin: Skin is warm and " dry. No rash noted. She is not diaphoretic.   Psychiatric:   Anxious.   Nursing note and vitals reviewed.      Results for orders placed or performed in visit on 10/29/18   HCG Quantitative Pregnancy, Blood (ITI799)   Result Value Ref Range    HCG Quantitative Serum 1398 IU/L     Component      Latest Ref Rng & Units 10/26/2018   WBC      4.0 - 11.0 10e9/L 10.5   RBC Count      3.8 - 5.2 10e12/L 4.13   Hemoglobin      11.7 - 15.7 g/dL 11.9   Hematocrit      35.0 - 47.0 % 36.2   MCV      78 - 100 fl 88   MCH      26.5 - 33.0 pg 28.8   MCHC      31.5 - 36.5 g/dL 32.9   RDW      10.0 - 15.0 % 13.2   Platelet Count      150 - 450 10e9/L 299   Diff Method       Automated Method   % Neutrophils      % 61.5   % Lymphocytes      % 30.1   % Monocytes      % 5.6   % Eosinophils      % 1.8   % Basophils      % 0.7   % Immature Granulocytes      % 0.3   Absolute Neutrophil      1.6 - 8.3 10e9/L 6.5   Absolute Lymphocytes      0.8 - 5.3 10e9/L 3.2   Absolute Monocytes      0.0 - 1.3 10e9/L 0.6   Absolute Eosinophils      0.0 - 0.7 10e9/L 0.2   Absolute Basophils      0.0 - 0.2 10e9/L 0.1   Abs Immature Granulocytes      0 - 0.4 10e9/L 0.0   Sodium      134 - 144 mmol/L 139   Potassium      3.5 - 5.1 mmol/L 4.5   Chloride      98 - 107 mmol/L 107   Carbon Dioxide      21 - 31 mmol/L 25   Anion Gap      3 - 14 mmol/L 7   Glucose      70 - 105 mg/dL 96   Urea Nitrogen      7 - 25 mg/dL 13   Creatinine      0.60 - 1.20 mg/dL 0.74   GFR Estimate      >60 mL/min/1.7m2 >90   GFR Estimate If Black      >60 mL/min/1.7m2 >90   Calcium      8.6 - 10.3 mg/dL 9.6   Bilirubin Total      0.3 - 1.0 mg/dL 0.3   Albumin      3.5 - 5.7 g/dL 4.0   Protein Total      6.4 - 8.9 g/dL 7.0   Alkaline Phosphatase      34 - 104 U/L 49   ALT      7 - 52 U/L 7   AST      13 - 39 U/L 9 (L)   Color Urine       Yellow   Appearance Urine       Clear   Glucose Urine      NEG:Negative mg/dL Negative   Bilirubin Urine      NEG:Negative Negative   Ketones Urine       NEG:Negative mg/dL Negative   Specific Gravity Urine      1.000 - 1.030 1.025   Blood Urine      NEG:Negative Negative   pH Urine      5.0 - 9.0 pH 7.0   Protein Albumin Urine      NEG:Negative mg/dL Negative   Urobilinogen Urine      0.2 - 1.0 EU/dL 0.2   Nitrite Urine      NEG:Negative Negative   Leukocyte Esterase Urine      NEG:Negative Small (A)   Source       Midstream Urine   WBC Urine      OTO5:0 - 5 /HPF 0 - 5   RBC Urine      OTO2:O - 2 /HPF O - 2   Squamous Epithelial /LPF Urine      FEW:Few /LPF Moderate (A)   Bacteria Urine      NEG:Negative /HPF Few (A)   Amorphous Crystals      NEG:Negative /HPF Many (A)   HCG Quantitative Serum      IU/L 378   Lipase      11 - 82 U/L 16   Lactic Acid      0.5 - 2.2 mmol/L 0.7     ED Course     ED Course     Procedures               Critical Care time:  none               Results for orders placed or performed during the hospital encounter of 10/30/18 (from the past 24 hour(s))   UA reflex to Microscopic and Culture   Result Value Ref Range    Color Urine Yellow     Appearance Urine Slightly Cloudy     Glucose Urine Negative NEG^Negative mg/dL    Bilirubin Urine Negative NEG^Negative    Ketones Urine Negative NEG^Negative mg/dL    Specific Gravity Urine 1.015 1.000 - 1.030    Blood Urine Negative NEG^Negative    pH Urine 8.5 5.0 - 9.0 pH    Protein Albumin Urine Negative NEG^Negative mg/dL    Urobilinogen Urine 0.2 0.2 - 1.0 EU/dL    Nitrite Urine Negative NEG^Negative    Leukocyte Esterase Urine Small (A) NEG^Negative    Source Unspecified Urine    Urine Microscopic   Result Value Ref Range    WBC Urine 5-10 (A) OTO5^0 - 5 /HPF    RBC Urine O - 2 OTO2^O - 2 /HPF    Squamous Epithelial /LPF Urine Many (A) FEW^Few /LPF    Bacteria Urine Moderate (A) NEG^Negative /HPF    Amorphous Crystals Few (A) NEG^Negative /HPF       Medications - No data to display      Assessments & Plan (with Medical Decision Making)   23 year old G2 with past hx of early miscarriage and very  worried about another miscarriage.  I reviewed her previous early miscarriage and noted that her blood type and Rh factor wasn't known so it wasn't known whether to offer Rhogam.  Reviewed that at this point her sx are most c/w diarrhea in setting of IBS/chronic constipation.  It is too early to repeat her BHCG and discussed options.  Patient is set up for US in November and she is comfortable with return to clinic in 1-2 days for repeat BHCG quantitative level and also to draw ABO Rh.  Follow up in clinic as need for persistent pain and reviewed change in bowel habit to increase water in diet, allow time after meals for a gentle BM, and eat low on the food chain to see if she can change to one soft BM daily.  Also, she is given a note excusing her from work/school for the next couple days.    I have reviewed the nursing notes.    I have reviewed the findings, diagnosis, plan and need for follow up with the patient.       Discharge Medication List as of 10/30/2018  7:51 AM          Final diagnoses:   Diarrhea, unspecified type   Early stage of pregnancy       10/30/2018   [facility] \[facility] [place] [facility]\""Delbert blevins MD  10/30/18 3514

## 2018-10-30 NOTE — ED AVS SNAPSHOT
[facility]    [address]    [place] [address]    Phone:  [phone]    Fax:  [phone]                                       Alize Clifton   MRN: [id number]    Department:  [facility]   Date of Visit:  10/30/2018           After Visit Summary Signature Page     I have received my discharge instructions, and my questions have been answered. I have discussed any challenges I see with this plan with the nurse or doctor.    ..........................................................................................................................................  Patient/Patient Representative Signature      ..........................................................................................................................................  Patient Representative Print Name and Relationship to Patient    ..................................................               ................................................  Date                                   Time    ..........................................................................................................................................  Reviewed by Signature/Title    ...................................................              ..............................................  Date                                               Time          22EPIC Rev 08/18

## 2018-10-31 ENCOUNTER — TELEPHONE (OUTPATIENT)
Dept: FAMILY MEDICINE | Facility: OTHER | Age: 23
End: 2018-10-31

## 2018-10-31 DIAGNOSIS — Z34.90 EARLY STAGE OF PREGNANCY: ICD-10-CM

## 2018-10-31 LAB
ABO + RH BLD: NORMAL
ABO + RH BLD: NORMAL
B-HCG SERPL-ACNC: 2325 IU/L
BACTERIA SPEC CULT: NORMAL
SPECIMEN EXP DATE BLD: NORMAL
SPECIMEN SOURCE: NORMAL

## 2018-10-31 PROCEDURE — 86900 BLOOD TYPING SEROLOGIC ABO: CPT | Performed by: FAMILY MEDICINE

## 2018-10-31 PROCEDURE — 84702 CHORIONIC GONADOTROPIN TEST: CPT | Performed by: FAMILY MEDICINE

## 2018-10-31 PROCEDURE — 36415 COLL VENOUS BLD VENIPUNCTURE: CPT | Performed by: FAMILY MEDICINE

## 2018-10-31 PROCEDURE — 86901 BLOOD TYPING SEROLOGIC RH(D): CPT | Performed by: FAMILY MEDICINE

## 2018-10-31 NOTE — TELEPHONE ENCOUNTER
"It looks like blood type is A positive; and she has an increasing Hcg level - up to 2325 today.  Adequate increase for 48 hours (looking for an \"approximate doubling\").  Farzaneh Jolly, DO  "

## 2018-10-31 NOTE — TELEPHONE ENCOUNTER
Patient was in ER yesterday for diarrhea and early pregnancy , they did lab work and patient is looking for results . Would doctor okay to give results . Gabrielle Tijerina LPN ....................10/31/2018  3:47 PM

## 2018-11-01 NOTE — TELEPHONE ENCOUNTER
After birth date was verified, spoke with patient and let her know the below information. No further questions or concerns at this time.        Josep Lux LPN 11/01/18 9:12 AM

## 2018-11-12 ENCOUNTER — HOSPITAL ENCOUNTER (OUTPATIENT)
Dept: ULTRASOUND IMAGING | Facility: OTHER | Age: 23
Discharge: HOME | End: 2018-11-12
Attending: FAMILY MEDICINE | Admitting: FAMILY MEDICINE
Payer: COMMERCIAL

## 2018-11-12 DIAGNOSIS — Z34.91 ENCOUNTER FOR SUPERVISION OF NORMAL PREGNANCY IN FIRST TRIMESTER, UNSPECIFIED GRAVIDITY: Primary | ICD-10-CM

## 2018-11-12 DIAGNOSIS — Z34.90 PREGNANCY, UNSPECIFIED GESTATIONAL AGE: ICD-10-CM

## 2018-11-12 PROCEDURE — 76817 TRANSVAGINAL US OBSTETRIC: CPT

## 2018-11-21 ENCOUNTER — HOSPITAL ENCOUNTER (OUTPATIENT)
Dept: ULTRASOUND IMAGING | Facility: OTHER | Age: 23
Discharge: HOME | End: 2018-11-21
Attending: FAMILY MEDICINE | Admitting: FAMILY MEDICINE
Payer: COMMERCIAL

## 2018-11-21 ENCOUNTER — OFFICE VISIT (OUTPATIENT)
Dept: OBGYN | Facility: OTHER | Age: 23
End: 2018-11-21
Attending: OBSTETRICS & GYNECOLOGY
Payer: COMMERCIAL

## 2018-11-21 VITALS
BODY MASS INDEX: 35.89 KG/M2 | SYSTOLIC BLOOD PRESSURE: 128 MMHG | DIASTOLIC BLOOD PRESSURE: 84 MMHG | WEIGHT: 229.13 LBS | HEART RATE: 84 BPM

## 2018-11-21 DIAGNOSIS — O03.4 INCOMPLETE MISCARRIAGE: Primary | ICD-10-CM

## 2018-11-21 DIAGNOSIS — Z34.91 ENCOUNTER FOR SUPERVISION OF NORMAL PREGNANCY IN FIRST TRIMESTER, UNSPECIFIED GRAVIDITY: ICD-10-CM

## 2018-11-21 PROCEDURE — G0463 HOSPITAL OUTPT CLINIC VISIT: HCPCS

## 2018-11-21 PROCEDURE — G0463 HOSPITAL OUTPT CLINIC VISIT: HCPCS | Mod: 25

## 2018-11-21 PROCEDURE — 99203 OFFICE O/P NEW LOW 30 MIN: CPT | Performed by: OBSTETRICS & GYNECOLOGY

## 2018-11-21 PROCEDURE — 76817 TRANSVAGINAL US OBSTETRIC: CPT

## 2018-11-21 ASSESSMENT — PAIN SCALES - GENERAL: PAINLEVEL: NO PAIN (0)

## 2018-11-21 NOTE — PROGRESS NOTES
Alize is a very pleasant 23-year-old who is here with her significant other as an urgent add-on from ultrasound.  Unfortunately was found to have a first trimester miscarriage on ultrasound today.  New    Patient is a  2 para 0.  Her first pregnancy was in 2016 and she had a first trimester loss.  Ultrasound at that time showed a nonviable 8-week size embryo at 12 weeks gestation.  Expectant management was undertaken and she did have a spontaneous miscarriage.    With this pregnancy she had had an ultrasound 2 weeks ago which showed a 6-week size embryo no discernible fetal heart tones.  A follow-up ultrasound was scheduled for today and it shows minimal change in size of the embryo with no cardiac activity.  The patient herself is not had any bleeding or cramping.    Family history is significant for her mother having had 5 miscarriages and a second trimester loss amongst her pregnancies.    Patient is healthy other than obesity.    Past surgical history none    Allergies azithromycin    Social history: Significant other, has received most of her medical care in [facility]    Tobacco: Quit when she found out she was pregnant    Review of systems: Otherwise completely negative    Physical exam:  Patient alert orientated x3 appears in no distress  Blood pressure 110/70, pulse 84, afebrile  Pelvic: Deferred    Assessment:  23-year-old  2 para 0 now with her second spontaneous first trimester miscarriage.  Her first was treated with expectant management and spontaneous miscarriage.  Family history of recurrent miscarriage with her mother    Plan:  Miscarriage discussed in detail with the patient and her significant other.  Discussed that it is possible to begin a workup after 2 or 3 miscarriages.  Blood tests including anticardiolipin lupus anticoagulant could be performed.  Assessing for a uterine septum would also be appropriate with and HSonoG.  Given her mother's history at some point chromosome  analysis on the patient could be considered to make sure there is not a balance translocation.  In regards to this pregnancy we discussed expectant management, the use of Cytotec and D&C.  At this point in time patient is content for expectant management.  Advised follow-up in 2 weeks to discuss further plan if she is not miscarried.    30 minutes spent, majority in counseling time

## 2018-11-21 NOTE — NURSING NOTE
"Chief Complaint   Patient presents with     Consult     f/u ultrasound     Patient presents today as a follow-up on ultrasound.   Initial /84 (BP Location: Right arm, Patient Position: Sitting, Cuff Size: Adult Regular)  Pulse 84  Wt 103.9 kg (229 lb 2 oz)  LMP 09/13/2018 (Exact Date)  BMI 35.89 kg/m2 Estimated body mass index is 35.89 kg/(m^2) as calculated from the following:    Height as of 10/30/18: 1.702 m (5' 7\").    Weight as of this encounter: 103.9 kg (229 lb 2 oz).  Medication Reconciliation: complete    Lizzy Burton LPN  "

## 2018-11-27 ENCOUNTER — TELEPHONE (OUTPATIENT)
Dept: FAMILY MEDICINE | Facility: OTHER | Age: 23
End: 2018-11-27

## 2018-11-27 NOTE — LETTER
November 27, 2018      Alize Clifton  [address] [address] [address]        To Whom It May Concern:    Alize Clifton was seen in our ER on 10/26/18.      Sincerely,        Marisabel Terry MD

## 2018-11-27 NOTE — LETTER
November 27, 2018      Alize Clifton  [address] [address] [address]        To Whom It May Concern:    Alize Clifton was seen in our ER on 10/26/18.  Please excuse her from work for 10/26-10/28/18.      Sincerely,        Marisabel Terry MD

## 2018-11-27 NOTE — TELEPHONE ENCOUNTER
Would the doctor be willing to write a note for this ? . Gabrielle Tijerina LPN ....................11/27/2018  4:46 PM

## 2018-11-28 NOTE — TELEPHONE ENCOUNTER
Patient notified of letter, states also missed work 10/27 and 10/28 states was upset about miscarriage and couldn't go to work. Wondering if letter can be changed to reflect these dates.   Mary Alice Weinberg LPN .............11/28/2018     10:04 AM

## 2018-12-06 ENCOUNTER — OFFICE VISIT (OUTPATIENT)
Dept: OBGYN | Facility: OTHER | Age: 23
End: 2018-12-06
Attending: OBSTETRICS & GYNECOLOGY
Payer: COMMERCIAL

## 2018-12-06 VITALS
WEIGHT: 229 LBS | BODY MASS INDEX: 35.87 KG/M2 | DIASTOLIC BLOOD PRESSURE: 72 MMHG | HEART RATE: 72 BPM | SYSTOLIC BLOOD PRESSURE: 130 MMHG

## 2018-12-06 DIAGNOSIS — O03.9 COMPLETE MISCARRIAGE: Primary | ICD-10-CM

## 2018-12-06 PROCEDURE — 99212 OFFICE O/P EST SF 10 MIN: CPT | Performed by: OBSTETRICS & GYNECOLOGY

## 2018-12-06 PROCEDURE — G0463 HOSPITAL OUTPT CLINIC VISIT: HCPCS

## 2018-12-06 ASSESSMENT — PAIN SCALES - GENERAL: PAINLEVEL: MODERATE PAIN (4)

## 2018-12-06 NOTE — PROGRESS NOTES
Alize is here for two-week follow-up.  Of note she had onset of heavy bleeding yesterday and during the night did have a spontaneous miscarriage.  She did take a picture of this and the picture shows placental and fetal tissue present.  Her bleeding is since been effectively decreased.    Again we discussed miscarriage.  We discussed this being her second.  We discussed epic testing options and at this point patient is declining with any testing at this point.  She will notify us if she would desire this in the future.    10 minutes counseling time

## 2018-12-06 NOTE — NURSING NOTE
Chief Complaint   Patient presents with     RECHECK     Miscarriage        Medication Reconciliation: completed   Jenn Johnson LPN  12/6/2018 3:15 PM

## 2018-12-06 NOTE — LETTER
December 6, 2018      Alize Clifton  [address] [address] [address]        To Whom It May Concern:    Alize Clifton was seen in our clinic on 12/6/18. She may return to work without restrictions on 12/10/18 .      Sincerely,        Cody Jenkins MD

## 2018-12-06 NOTE — MR AVS SNAPSHOT
After Visit Summary   12/6/2018    Alize Clifton    MRN: [id number]           Patient Information     Date Of Birth          1995        Visit Information        Provider Department      12/6/2018 3:00 PM Cody Jenkins MD [facility]        Today's Diagnoses     Complete miscarriage    -  1       Follow-ups after your visit        Who to contact     If you have questions or need follow up information about today's clinic visit or your schedule please contact [facility] directly at [phone].  Normal or non-critical lab and imaging results will be communicated to you by MyChart, letter or phone within 4 business days after the clinic has received the results. If you do not hear from us within 7 days, please contact the clinic through MyChart or phone. If you have a critical or abnormal lab result, we will notify you by phone as soon as possible.  Submit refill requests through [employer] or call your pharmacy and they will forward the refill request to us. Please allow 3 business days for your refill to be completed.          Additional Information About Your Visit        Care EveryWhere ID     This is your Care EveryWhere ID. This could be used by other organizations to access your [place] medical records  [id number]        Your Vitals Were     Pulse Last Period Breastfeeding? BMI (Body Mass Index)          72 09/13/2018 (Exact Date) No 35.87 kg/m2         Blood Pressure from Last 3 Encounters:   12/06/18 130/72   11/21/18 128/84   10/30/18 106/57    Weight from Last 3 Encounters:   12/06/18 103.9 kg (229 lb)   11/21/18 103.9 kg (229 lb 2 oz)   10/30/18 103.4 kg (228 lb)              Today, you had the following     No orders found for display       Primary Care Provider Office Phone # Fax #    Kisha Hugo [phone]       [facility] [address] [facility] [address]        Equal Access to Services     KEYONA MENDEZ  AH: Pepe arechiga Lynherve, waluísda luqdavid, qaprincessta kalisa honorioluisshante neffmarjchema diallo. So [facility] [phone].    ATENCIÓN: Si habla español, tiene a mireles disposición servicios gratuitos de asistencia lingüística. Llame al [phone].    We comply with applicable federal civil rights laws and [place] laws. We do not discriminate on the basis of race, color, national origin, age, disability, sex, sexual orientation, or gender identity.            Thank you!     Thank you for choosing [facility] [place]  for your care. Our goal is always to provide you with excellent care. Hearing back from our patients is one way we can continue to improve our services. Please take a few minutes to complete the written survey that you may receive in the mail after your visit with us. Thank you!             Your Updated Medication List - Protect others around you: Learn how to safely use, store and throw away your medicines at www.disposemymeds.org.          This list is accurate as of 12/6/18  3:37 PM.  Always use your most recent med list.                   Brand Name Dispense Instructions for use Diagnosis    Prenatal Adult Gummy/DHA/FA 0.4-25 MG Chew      Take 1 tablet by mouth

## 2018-12-21 ENCOUNTER — TELEPHONE (OUTPATIENT)
Dept: OBGYN | Facility: OTHER | Age: 23
End: 2018-12-21

## 2018-12-21 NOTE — TELEPHONE ENCOUNTER
This RN returned pt's telephone call.  Pt states she needs a medical excuse letter for missing work on 12-17-18 due to miscarriage.  Letter written per pt request.  Ofelia Spain RN on 12/21/2018 at 1:11 PM

## 2018-12-21 NOTE — LETTER
December 21, 2018      Alize Clifton  [address] [address] [address]        To Whom It May Concern,      Please excuse Alize Clifton from work for medical reasons on   Monday, December 17, 2018.                Sincerely,        Ofelia Spain RN / Cody Jenkins MD

## 2019-01-27 ENCOUNTER — APPOINTMENT (OUTPATIENT)
Dept: ULTRASOUND IMAGING | Facility: OTHER | Age: 24
End: 2019-01-27
Payer: COMMERCIAL

## 2019-01-27 ENCOUNTER — HOSPITAL ENCOUNTER (EMERGENCY)
Facility: OTHER | Age: 24
Discharge: HOME | End: 2019-01-27
Attending: PHYSICIAN ASSISTANT | Admitting: PHYSICIAN ASSISTANT
Payer: COMMERCIAL

## 2019-01-27 VITALS
HEART RATE: 79 BPM | BODY MASS INDEX: 35.87 KG/M2 | TEMPERATURE: 96.1 F | RESPIRATION RATE: 14 BRPM | DIASTOLIC BLOOD PRESSURE: 69 MMHG | WEIGHT: 229 LBS | OXYGEN SATURATION: 100 % | SYSTOLIC BLOOD PRESSURE: 107 MMHG

## 2019-01-27 DIAGNOSIS — N93.9 VAGINAL BLEEDING: ICD-10-CM

## 2019-01-27 DIAGNOSIS — Z3A.01 6 WEEKS GESTATION OF PREGNANCY: ICD-10-CM

## 2019-01-27 LAB
ALBUMIN UR-MCNC: NEGATIVE MG/DL
APPEARANCE UR: CLEAR
B-HCG SERPL-ACNC: NORMAL IU/L
BASOPHILS # BLD AUTO: 0.1 10E9/L (ref 0–0.2)
BASOPHILS NFR BLD AUTO: 0.7 %
BILIRUB UR QL STRIP: NEGATIVE
CAOX CRY #/AREA URNS HPF: ABNORMAL /HPF
COLOR UR AUTO: YELLOW
DIFFERENTIAL METHOD BLD: NORMAL
EOSINOPHIL # BLD AUTO: 0.1 10E9/L (ref 0–0.7)
EOSINOPHIL NFR BLD AUTO: 1.5 %
ERYTHROCYTE [DISTWIDTH] IN BLOOD BY AUTOMATED COUNT: 13.6 % (ref 10–15)
GLUCOSE UR STRIP-MCNC: NEGATIVE MG/DL
HCG UR QL: POSITIVE
HCT VFR BLD AUTO: 37.9 % (ref 35–47)
HGB BLD-MCNC: 12.5 G/DL (ref 11.7–15.7)
HGB UR QL STRIP: ABNORMAL
IMM GRANULOCYTES # BLD: 0 10E9/L (ref 0–0.4)
IMM GRANULOCYTES NFR BLD: 0.3 %
KETONES UR STRIP-MCNC: ABNORMAL MG/DL
LEUKOCYTE ESTERASE UR QL STRIP: NEGATIVE
LYMPHOCYTES # BLD AUTO: 2.7 10E9/L (ref 0.8–5.3)
LYMPHOCYTES NFR BLD AUTO: 31.3 %
MCH RBC QN AUTO: 27.7 PG (ref 26.5–33)
MCHC RBC AUTO-ENTMCNC: 33 G/DL (ref 31.5–36.5)
MCV RBC AUTO: 84 FL (ref 78–100)
MONOCYTES # BLD AUTO: 0.7 10E9/L (ref 0–1.3)
MONOCYTES NFR BLD AUTO: 7.7 %
MUCOUS THREADS #/AREA URNS LPF: PRESENT /LPF
NEUTROPHILS # BLD AUTO: 5 10E9/L (ref 1.6–8.3)
NEUTROPHILS NFR BLD AUTO: 58.5 %
NITRATE UR QL: NEGATIVE
NON-SQ EPI CELLS #/AREA URNS LPF: ABNORMAL /LPF
PH UR STRIP: 5.5 PH (ref 5–9)
PLATELET # BLD AUTO: 296 10E9/L (ref 150–450)
RBC # BLD AUTO: 4.52 10E12/L (ref 3.8–5.2)
RBC #/AREA URNS AUTO: ABNORMAL /HPF
SOURCE: ABNORMAL
SP GR UR STRIP: 1.02 (ref 1–1.03)
UROBILINOGEN UR STRIP-ACNC: 1 EU/DL (ref 0.2–1)
WBC # BLD AUTO: 8.6 10E9/L (ref 4–11)
WBC #/AREA URNS AUTO: ABNORMAL /HPF

## 2019-01-27 PROCEDURE — 99283 EMERGENCY DEPT VISIT LOW MDM: CPT | Mod: Z6 | Performed by: PHYSICIAN ASSISTANT

## 2019-01-27 PROCEDURE — 85025 COMPLETE CBC W/AUTO DIFF WBC: CPT | Performed by: PHYSICIAN ASSISTANT

## 2019-01-27 PROCEDURE — 99284 EMERGENCY DEPT VISIT MOD MDM: CPT | Mod: 25 | Performed by: PHYSICIAN ASSISTANT

## 2019-01-27 PROCEDURE — 76801 OB US < 14 WKS SINGLE FETUS: CPT | Mod: TC

## 2019-01-27 PROCEDURE — 36415 COLL VENOUS BLD VENIPUNCTURE: CPT | Performed by: PHYSICIAN ASSISTANT

## 2019-01-27 PROCEDURE — 81001 URINALYSIS AUTO W/SCOPE: CPT | Performed by: FAMILY MEDICINE

## 2019-01-27 PROCEDURE — 81025 URINE PREGNANCY TEST: CPT | Performed by: FAMILY MEDICINE

## 2019-01-27 PROCEDURE — 84702 CHORIONIC GONADOTROPIN TEST: CPT | Performed by: PHYSICIAN ASSISTANT

## 2019-01-27 ASSESSMENT — ENCOUNTER SYMPTOMS
FEVER: 0
HEMATURIA: 0
ABDOMINAL PAIN: 0
LIGHT-HEADEDNESS: 0
CHILLS: 0
BACK PAIN: 0
FREQUENCY: 0
DIARRHEA: 0
WOUND: 0
BRUISES/BLEEDS EASILY: 0
VOMITING: 0
SHORTNESS OF BREATH: 0
DIZZINESS: 0
CONFUSION: 0
NAUSEA: 0
ADENOPATHY: 0
CONSTIPATION: 0
CHEST TIGHTNESS: 0

## 2019-01-27 NOTE — ED AVS SNAPSHOT
[facility]  [address]  [place] [address]  Phone:  [phone]  Fax:  [phone]                                    Alize Clifton   MRN: [id number]    Department:  [facility]   Date of Visit:  1/27/2019           After Visit Summary Signature Page    I have received my discharge instructions, and my questions have been answered. I have discussed any challenges I see with this plan with the nurse or doctor.    ..........................................................................................................................................  Patient/Patient Representative Signature      ..........................................................................................................................................  Patient Representative Print Name and Relationship to Patient    ..................................................               ................................................  Date                                   Time    ..........................................................................................................................................  Reviewed by Signature/Title    ...................................................              ..............................................  Date                                               Time          22EPIC Rev 08/18

## 2019-01-27 NOTE — ED TRIAGE NOTES
Pt here with friend with c/o vaginal bleeding, pt states that she took a home pregnancy test on Friday that was positive, mild cramping this AM, moderate bleeding at this time, pt states that she had a miscarriage December 4, 2018 and hasn't had a period since then, VSS, no acute distress noted, pt brought back into ER to be evaluated

## 2019-01-27 NOTE — ED PROVIDER NOTES
History     Chief Complaint   Patient presents with     Vaginal Bleeding     This is a 23-year-old female who recently saw Dr. Jenkins due to miscarriage on 2018.  It appears that at the time Dr. Jenkins had a long discussion with the patient because she has had some multiple miscarriages in the past.  She thought she had been doing well and then started having some vaginal bleeding today as well.  She did do a home pregnancy test which was positive.  She is here for further evaluation.  She denies any lightheadedness or dizziness.  Reports she is not the type that needs RhoGam.  Denies any fever or chills no emesis but some nausea.  She is tearful with concerns for another miscarriage.  Ativan was offered but she declined at this time.            Allergies:  Allergies   Allergen Reactions     Azithromycin Rash       Problem List:    Patient Active Problem List    Diagnosis Date Noted     SAB (spontaneous ) 2016     Priority: Medium     HPV vaccine counseling 2016     Priority: Medium     Supervision of normal pregnancy in first trimester 2016     Priority: Medium     Contraceptive management 2015     Priority: Medium     TMJ pain dysfunction syndrome 2014     Priority: Medium     Abdominal pain, generalized 10/14/2009     Priority: Medium     Overview:   IMO Update 10/11       Allergic state 2007     Priority: Medium     Overview:   IMO Update 10/11          Past Medical History:    No past medical history on file.    Past Surgical History:    No past surgical history on file.    Family History:    No family history on file.    Social History:  Marital Status:  Single [1]  Social History     Tobacco Use     Smoking status: Former Smoker     Packs/day: 0.25     Types: Cigarettes     Last attempt to quit: 10/31/2018     Years since quittin.2     Smokeless tobacco: Never Used   Substance Use Topics     Alcohol use: No     Drug use: No        Medications:       Prenatal MV & Min w/FA-DHA (PRENATAL ADULT GUMMY/DHA/FA) 0.4-25 MG CHEW         Review of Systems   Constitutional: Negative for chills and fever.   HENT: Negative for congestion.    Eyes: Negative for visual disturbance.   Respiratory: Negative for chest tightness and shortness of breath.    Cardiovascular: Negative for chest pain.   Gastrointestinal: Negative for abdominal pain, constipation, diarrhea, nausea and vomiting.   Genitourinary: Positive for vaginal bleeding. Negative for frequency, hematuria and pelvic pain.   Musculoskeletal: Negative for back pain.   Skin: Negative for rash and wound.   Neurological: Negative for dizziness, syncope and light-headedness.   Hematological: Negative for adenopathy. Does not bruise/bleed easily.   Psychiatric/Behavioral: Negative for confusion.       Physical Exam   BP: 112/61  Pulse: 79  Temp: 96.1  F (35.6  C)  Resp: 14  Weight: 103.9 kg (229 lb)  SpO2: 100 %      Physical Exam    ED Course     ED Course as of Jan 27 2204   Sun Jan 27, 2019   1807 HCG Quantitative Serum: 21,449     Procedures                   Results for orders placed or performed during the hospital encounter of 01/27/19 (from the past 24 hour(s))   *UA reflex to Microscopic   Result Value Ref Range    Color Urine Yellow     Appearance Urine Clear     Glucose Urine Negative NEG^Negative mg/dL    Bilirubin Urine Negative NEG^Negative    Ketones Urine Trace (A) NEG^Negative mg/dL    Specific Gravity Urine 1.025 1.000 - 1.030    Blood Urine Small (A) NEG^Negative    pH Urine 5.5 5.0 - 9.0 pH    Protein Albumin Urine Negative NEG^Negative mg/dL    Urobilinogen Urine 1.0 0.2 - 1.0 EU/dL    Nitrite Urine Negative NEG^Negative    Leukocyte Esterase Urine Negative NEG^Negative    Source Midstream Urine    HCG qualitative urine   Result Value Ref Range    HCG Qual Urine Positive (A) NEG^Negative   Urine Microscopic   Result Value Ref Range    WBC Urine 0 - 5 OTO5^0 - 5 /HPF    RBC Urine 2-5 (A) OTO2^O - 2  /HPF    Squamous Epithelial /LPF Urine Moderate (A) FEW^Few /LPF    Calcium Oxalate Few (A) NEG^Negative /HPF    Mucous Urine Present (A) NEG^Negative /LPF   CBC with platelets differential   Result Value Ref Range    WBC 8.6 4.0 - 11.0 10e9/L    RBC Count 4.52 3.8 - 5.2 10e12/L    Hemoglobin 12.5 11.7 - 15.7 g/dL    Hematocrit 37.9 35.0 - 47.0 %    MCV 84 78 - 100 fl    MCH 27.7 26.5 - 33.0 pg    MCHC 33.0 31.5 - 36.5 g/dL    RDW 13.6 10.0 - 15.0 %    Platelet Count 296 150 - 450 10e9/L    Diff Method Automated Method     % Neutrophils 58.5 %    % Lymphocytes 31.3 %    % Monocytes 7.7 %    % Eosinophils 1.5 %    % Basophils 0.7 %    % Immature Granulocytes 0.3 %    Absolute Neutrophil 5.0 1.6 - 8.3 10e9/L    Absolute Lymphocytes 2.7 0.8 - 5.3 10e9/L    Absolute Monocytes 0.7 0.0 - 1.3 10e9/L    Absolute Eosinophils 0.1 0.0 - 0.7 10e9/L    Absolute Basophils 0.1 0.0 - 0.2 10e9/L    Abs Immature Granulocytes 0.0 0 - 0.4 10e9/L   HCG quantitative pregnancy (blood)   Result Value Ref Range    HCG Quantitative Serum 21,449 IU/L   US OB <14 Weeks w Transvaginal Single    Narrative    EXAM:    US Pregnancy First Trimester, Transabdominal and US Pregnancy, Transvaginal     EXAM DATE/TIME:    1/27/2019 6:34 PM     CLINICAL HISTORY:    23 years old, female; Signs and symptoms; Lmp or gestational age (in weeks):   Na; Antepartum complications; Other: Vaginal bleeding; Pregnant; Additional   info: Threatened miscarriage     TECHNIQUE:    Real-time transabdominal obstetrical ultrasound of the maternal pelvis and a   first trimester pregnancy, less than 14 weeks 0 days, with image documentation.   Transvaginal imaging was used for better evaluation of the fetus and adnexa.     COMPARISON:    US OB < 14 WEEKS SINGLE 11/21/2018 3:28 PM     FINDINGS:     UTERUS: The uterus is gravid with a single intrauterine gestational sac   containing fetal pole and yolk sac. Cardiac activity at 118 beats per minute.   Composite gestational  age/fetal pole corresponds to 6 weeks and 4 days with   estimated date of confinement of 9/18/2019.     .     ADNEXA: Adnexa are unremarkable without evidence of an adnexal mass or   abnormality. RIGHT ovary measures approximately 3.6 x 2.7 x 2.2 cm and LEFT   ovary 3.3 x 1.3 x 1.2 cm. No free fluid in the pelvis. Small ovarian   cysts/follicles.       Impression    IMPRESSION:   Single viable intrauterine gestation.     THIS DOCUMENT HAS BEEN ELECTRONICALLY SIGNED BY PRITESH NEVILLE MD       Medications - No data to display    Assessments & Plan (with Medical Decision Making)     I have reviewed the nursing notes.    I have reviewed the findings, diagnosis, plan and need for follow up with the patient.         Medication List      Modified    Prenatal Adult Gummy/DHA/FA 0.4-25 MG Chew  1 tablet, Oral, DAILY  What changed:  when to take this            Final diagnoses:   6 weeks gestation of pregnancy   Vaginal bleeding     Afebrile.  Vital signs stable.  Patient with recent miscarriage on 12/6/2018.  Today with some increased vaginal spotting and discharge.  She has not had a menses since her previous miscarriage.  She did take a home pregnancy test and this came back positive.  She is here for further evaluation patient at this time.  Denies any abdominal or pelvic pain.  HCG qualitative pregnancy is positive.  HCG quantitative pregnancy shows 21,449.  This correlates with 4-5 weeks gestation.  UA shows hematuria but otherwise unremarkable.  I discussed the case with Dr. Jolly and ultrasound OB was performed.  This shows Single viable intrauterine gestation.  This is a very encouraging at this time.  The patient is very happy about this.  I discussed with the patient given her history of miscarriages close follow-up is advised and she will try and arrange an appointment tomorrow with Dr. Jenkins.  Rx for prenatal vitamins at this time.  Work note written to be off work until cleared by her OB/GYN.  Follow up if  there is any concerns for further evaluation as needed.  1/27/2019   [facility]     Peterson Fraser PA-C  01/27/19 6900

## 2019-01-27 NOTE — LETTER
January 27, 2019      To Whom It May Concern:      Alize Clifton was seen in our Emergency Department today, 01/27/19.  She will need to be off work until she is medically cleared by her OB/GYN..    Sincerely,              Peterson Lerma PA-C

## 2019-01-28 ENCOUNTER — HOSPITAL ENCOUNTER (EMERGENCY)
Facility: OTHER | Age: 24
Discharge: HOME | End: 2019-01-28
Attending: FAMILY MEDICINE | Admitting: FAMILY MEDICINE
Payer: COMMERCIAL

## 2019-01-28 ENCOUNTER — OFFICE VISIT (OUTPATIENT)
Dept: OBGYN | Facility: OTHER | Age: 24
End: 2019-01-28
Attending: OBSTETRICS & GYNECOLOGY
Payer: COMMERCIAL

## 2019-01-28 VITALS
HEART RATE: 59 BPM | HEIGHT: 67 IN | OXYGEN SATURATION: 98 % | WEIGHT: 220 LBS | BODY MASS INDEX: 34.53 KG/M2 | TEMPERATURE: 97.4 F | SYSTOLIC BLOOD PRESSURE: 127 MMHG | DIASTOLIC BLOOD PRESSURE: 67 MMHG | RESPIRATION RATE: 12 BRPM

## 2019-01-28 VITALS
HEART RATE: 80 BPM | WEIGHT: 221.4 LBS | SYSTOLIC BLOOD PRESSURE: 118 MMHG | DIASTOLIC BLOOD PRESSURE: 72 MMHG | RESPIRATION RATE: 18 BRPM | BODY MASS INDEX: 34.68 KG/M2

## 2019-01-28 DIAGNOSIS — N93.9 VAGINAL BLEEDING: ICD-10-CM

## 2019-01-28 DIAGNOSIS — O20.0 THREATENED MISCARRIAGE IN EARLY PREGNANCY: Primary | ICD-10-CM

## 2019-01-28 PROCEDURE — 99282 EMERGENCY DEPT VISIT SF MDM: CPT | Mod: Z6 | Performed by: FAMILY MEDICINE

## 2019-01-28 PROCEDURE — G0463 HOSPITAL OUTPT CLINIC VISIT: HCPCS | Mod: 25,27

## 2019-01-28 PROCEDURE — 99282 EMERGENCY DEPT VISIT SF MDM: CPT | Performed by: FAMILY MEDICINE

## 2019-01-28 PROCEDURE — G0463 HOSPITAL OUTPT CLINIC VISIT: HCPCS

## 2019-01-28 PROCEDURE — 99213 OFFICE O/P EST LOW 20 MIN: CPT | Performed by: OBSTETRICS & GYNECOLOGY

## 2019-01-28 ASSESSMENT — MIFFLIN-ST. JEOR: SCORE: 1795.54

## 2019-01-28 ASSESSMENT — PAIN SCALES - GENERAL: PAINLEVEL: NO PAIN (0)

## 2019-01-28 NOTE — ED AVS SNAPSHOT
[facility]  [address]  [place] [address]  Phone:  [phone]  Fax:  [phone]                                    Alize Clifton   MRN: [id number]    Department:  [facility]   Date of Visit:  1/28/2019           After Visit Summary Signature Page    I have received my discharge instructions, and my questions have been answered. I have discussed any challenges I see with this plan with the nurse or doctor.    ..........................................................................................................................................  Patient/Patient Representative Signature      ..........................................................................................................................................  Patient Representative Print Name and Relationship to Patient    ..................................................               ................................................  Date                                   Time    ..........................................................................................................................................  Reviewed by Signature/Title    ...................................................              ..............................................  Date                                               Time          22EPIC Rev 08/18

## 2019-01-28 NOTE — ED TRIAGE NOTES
"ED Nursing Triage Note (General)   ________________________________    Alize Clifton is a 23 year old Female that presents to triage private car  With history of  Being in ED last night and with vaginal bleeding, pt states that pregnancy was confirmed with last nights visit and was seen by KAUSHIK Jenkins today in clinic, was told to come in again if bleeding re-occurred, did have bleeding at work when wiped vaginal area with toilet paper.  Is tearful and states she does not need to use a pad for bleeding, reported by patient   Significant symptoms had onset 24 hour(s) ago.  /67   Pulse 59   Temp 97.4  F (36.3  C) (Tympanic)   Resp 12   Ht 1.702 m (5' 7\")   Wt 99.8 kg (220 lb)   LMP 09/13/2018 (Exact Date)   SpO2 98%   Breastfeeding? No   BMI 34.46 kg/m  t  Patient appears alert , in mild distress., and cooperative behavior.  Anxiety: due to vaginal bleeding with pregnancy  GCS 15  Airway: intact  Breathing noted as Normal.  Circulation Normal  Skin normal  Action taken:  To WR until room available, offered sanitary products and pt declined, told to go to desk if she feels she needs anything.      PRE HOSPITAL PRIOR LIVING SITUATION Children Only  "

## 2019-01-28 NOTE — PROGRESS NOTES
Patient was brought back to their room, rails were up and call light was within reach.     Handoff procedure information verbally given ..

## 2019-01-28 NOTE — NURSING NOTE
Chief Complaint   Patient presents with     RECHECK     ED f/u from 1/27      Patient seen in ER yesterday due to possible miscarriage. Ultrasound and labs showed about a 6 week fetus. See results. Patient is still bleeding. Bright red blood. No cramps.       Medication Reconciliation: complete    Jerica Lopez, LPN

## 2019-01-28 NOTE — LETTER
[facility]  [address]  [place] [address]          January 28, 2019    RE:  Alize Clifton                                                                                                                                                       [address] [place] [id number]            To whom it may concern:    Alize Clifton is under my professional care for Threatened miscarriage in early pregnancy She  may return to work with the following: The employee is ABLE to return to work tomorrow.        Sincerely,        Cody Jenkins MD

## 2019-01-29 ENCOUNTER — APPOINTMENT (OUTPATIENT)
Dept: LAB | Facility: OTHER | Age: 24
End: 2019-01-29
Attending: FAMILY MEDICINE
Payer: COMMERCIAL

## 2019-01-29 LAB — B-HCG SERPL-ACNC: NORMAL IU/L

## 2019-01-29 PROCEDURE — 25000128 H RX IP 250 OP 636

## 2019-01-29 PROCEDURE — 84702 CHORIONIC GONADOTROPIN TEST: CPT | Performed by: FAMILY MEDICINE

## 2019-01-29 NOTE — PROGRESS NOTES
Recent miscarriage - complete  Now newly pregnant, 6 1/2 weeks  Bleeding yesterday resulted in ED visit  US showed a viable pregnancy 6w5d with no apparent abnormalities.  Bleeding now scant.  Denies cramping.  Otherwise feels fine.    US reviewed with patient and .  Rh Pos    A:  Threatened ab    Plan:  Discussed above.  Pelvic rest.  F/u US next week  Warning signs discussed.    20 minutes - majority in counseling time

## 2019-01-29 NOTE — ED PROVIDER NOTES
"  History     Chief Complaint   Patient presents with     Vaginal Bleeding     HPI  Alize Clifton is a 23 year old female whose recent notes and US are reviewed.  She also had a quantitative HCG yesterday at around 7p.      She saw  just this afternoon in OB.      She went to work, had some vaginal bleeding, and drove straight here.  It is my impression that she would like another ultrasound.        Allergies:  Allergies   Allergen Reactions     Azithromycin Rash       Problem List:    Patient Active Problem List    Diagnosis Date Noted     SAB (spontaneous ) 2016     Priority: Medium     HPV vaccine counseling 2016     Priority: Medium     Supervision of normal pregnancy in first trimester 2016     Priority: Medium     Contraceptive management 2015     Priority: Medium     TMJ pain dysfunction syndrome 2014     Priority: Medium     Abdominal pain, generalized 10/14/2009     Priority: Medium     Overview:   IMO Update 10/11       Allergic state 2007     Priority: Medium     Overview:   IMO Update 10/11          Past Medical History:    History reviewed. No pertinent past medical history.    Past Surgical History:    History reviewed. No pertinent surgical history.    Family History:    History reviewed. No pertinent family history.    Social History:  Marital Status:  Single [1]  Social History     Tobacco Use     Smoking status: Current Every Day Smoker     Packs/day: 0.25     Types: Cigarettes     Last attempt to quit: 10/31/2018     Years since quittin.2     Smokeless tobacco: Never Used   Substance Use Topics     Alcohol use: No     Drug use: No        Medications:      Prenatal MV & Min w/FA-DHA (PRENATAL ADULT GUMMY/DHA/FA) 0.4-25 MG CHEW         Review of Systems  Has not been otherwise unwell  Physical Exam   BP: 127/67  Pulse: 59  Temp: 97.4  F (36.3  C)  Resp: 12  Height: 170.2 cm (5' 7\")  Weight: 99.8 kg (220 lb)  SpO2: 98 %      Physical " Exam  Well but tearful and very anxious patient.  Vitals stable.  No tachypnea or tachycardia noted.  No pelvic exam done today.  ED Course        Procedures               Critical Care time:  none               Results for orders placed or performed during the hospital encounter of 01/27/19 (from the past 24 hour(s))   US OB <14 Weeks w Transvaginal Single    Narrative    EXAM:    US Pregnancy First Trimester, Transabdominal and US Pregnancy, Transvaginal     EXAM DATE/TIME:    1/27/2019 6:34 PM     CLINICAL HISTORY:    23 years old, female; Signs and symptoms; Lmp or gestational age (in weeks):   Na; Antepartum complications; Other: Vaginal bleeding; Pregnant; Additional   info: Threatened miscarriage     TECHNIQUE:    Real-time transabdominal obstetrical ultrasound of the maternal pelvis and a   first trimester pregnancy, less than 14 weeks 0 days, with image documentation.   Transvaginal imaging was used for better evaluation of the fetus and adnexa.     COMPARISON:    US OB < 14 WEEKS SINGLE 11/21/2018 3:28 PM     FINDINGS:     UTERUS: The uterus is gravid with a single intrauterine gestational sac   containing fetal pole and yolk sac. Cardiac activity at 118 beats per minute.   Composite gestational age/fetal pole corresponds to 6 weeks and 4 days with   estimated date of confinement of 9/18/2019.     .     ADNEXA: Adnexa are unremarkable without evidence of an adnexal mass or   abnormality. RIGHT ovary measures approximately 3.6 x 2.7 x 2.2 cm and LEFT   ovary 3.3 x 1.3 x 1.2 cm. No free fluid in the pelvis. Small ovarian   cysts/follicles.       Impression    IMPRESSION:   Single viable intrauterine gestation.     THIS DOCUMENT HAS BEEN ELECTRONICALLY SIGNED BY PRITESH NEVILLE MD       Medications - No data to display    Assessments & Plan (with Medical Decision Making)     I have reviewed the nursing notes.    I have reviewed the findings, diagnosis, plan and need for follow up with the patient.   I spoke  with .  Unfortunately, there is no treatment or test that will change the outcome of this event, and at this time we do not feel that doing  another ultrasound is warranted.  Rather, organizing a beta-quant HCG tomorrow at 48hrs from first would be very informative, and standard-of-care.      She will then follow up with OB the following day for discussion and further care.         Medication List      There are no discharge medications for this visit.         Final diagnoses:   Vaginal bleeding       ADDENDUM 1/29/19 9pm--- unfortunately, quantitative HCG has declined.  She is counseled that this likely means a fetal demise, with miscarriage inevitable.  She will call  in the morning.    1/28/2019   [facility]     Darwin La MD  01/28/19 1891       Darwin La MD  01/29/19 8541

## 2019-02-01 ENCOUNTER — ANESTHESIA EVENT (OUTPATIENT)
Dept: SURGERY | Facility: OTHER | Age: 24
End: 2019-02-01
Payer: COMMERCIAL

## 2019-02-01 ENCOUNTER — ANESTHESIA (OUTPATIENT)
Dept: SURGERY | Facility: OTHER | Age: 24
End: 2019-02-01
Payer: COMMERCIAL

## 2019-02-01 ENCOUNTER — OFFICE VISIT (OUTPATIENT)
Dept: OBGYN | Facility: OTHER | Age: 24
End: 2019-02-01
Attending: OBSTETRICS & GYNECOLOGY
Payer: COMMERCIAL

## 2019-02-01 ENCOUNTER — HOSPITAL ENCOUNTER (OUTPATIENT)
Facility: OTHER | Age: 24
Discharge: HOME | End: 2019-02-01
Attending: OBSTETRICS & GYNECOLOGY | Admitting: OBSTETRICS & GYNECOLOGY
Payer: COMMERCIAL

## 2019-02-01 VITALS
OXYGEN SATURATION: 96 % | SYSTOLIC BLOOD PRESSURE: 103 MMHG | WEIGHT: 220 LBS | HEART RATE: 83 BPM | RESPIRATION RATE: 18 BRPM | DIASTOLIC BLOOD PRESSURE: 57 MMHG | TEMPERATURE: 97.7 F | BODY MASS INDEX: 34.46 KG/M2

## 2019-02-01 VITALS
BODY MASS INDEX: 34.46 KG/M2 | RESPIRATION RATE: 16 BRPM | TEMPERATURE: 99.6 F | HEART RATE: 100 BPM | SYSTOLIC BLOOD PRESSURE: 110 MMHG | WEIGHT: 220 LBS | DIASTOLIC BLOOD PRESSURE: 70 MMHG

## 2019-02-01 DIAGNOSIS — O03.4 INEVITABLE ABORTION: Primary | ICD-10-CM

## 2019-02-01 LAB
ERYTHROCYTE [DISTWIDTH] IN BLOOD BY AUTOMATED COUNT: 13.9 % (ref 10–15)
HCT VFR BLD AUTO: 34.4 % (ref 35–47)
HGB BLD-MCNC: 11.2 G/DL (ref 11.7–15.7)
MCH RBC QN AUTO: 28 PG (ref 26.5–33)
MCHC RBC AUTO-ENTMCNC: 32.6 G/DL (ref 31.5–36.5)
MCV RBC AUTO: 86 FL (ref 78–100)
PLATELET # BLD AUTO: 210 10E9/L (ref 150–450)
RBC # BLD AUTO: 4 10E12/L (ref 3.8–5.2)
WBC # BLD AUTO: 5.9 10E9/L (ref 4–11)

## 2019-02-01 PROCEDURE — G0463 HOSPITAL OUTPT CLINIC VISIT: HCPCS

## 2019-02-01 PROCEDURE — 25000128 H RX IP 250 OP 636: Performed by: NURSE ANESTHETIST, CERTIFIED REGISTERED

## 2019-02-01 PROCEDURE — 27210794 ZZH OR GENERAL SUPPLY STERILE: Performed by: OBSTETRICS & GYNECOLOGY

## 2019-02-01 PROCEDURE — 25000125 ZZHC RX 250: Performed by: NURSE ANESTHETIST, CERTIFIED REGISTERED

## 2019-02-01 PROCEDURE — 71000027 ZZH RECOVERY PHASE 2 EACH 15 MINS: Performed by: OBSTETRICS & GYNECOLOGY

## 2019-02-01 PROCEDURE — 88305 TISSUE EXAM BY PATHOLOGIST: CPT

## 2019-02-01 PROCEDURE — 40000306 ZZH STATISTIC PRE PROC ASSESS II: Performed by: OBSTETRICS & GYNECOLOGY

## 2019-02-01 PROCEDURE — 99213 OFFICE O/P EST LOW 20 MIN: CPT | Mod: 57 | Performed by: OBSTETRICS & GYNECOLOGY

## 2019-02-01 PROCEDURE — 25000128 H RX IP 250 OP 636

## 2019-02-01 PROCEDURE — 36000050 ZZH SURGERY LEVEL 2 1ST 30 MIN: Performed by: OBSTETRICS & GYNECOLOGY

## 2019-02-01 PROCEDURE — 37000008 ZZH ANESTHESIA TECHNICAL FEE, 1ST 30 MIN: Performed by: OBSTETRICS & GYNECOLOGY

## 2019-02-01 PROCEDURE — 85027 COMPLETE CBC AUTOMATED: CPT | Performed by: OBSTETRICS & GYNECOLOGY

## 2019-02-01 PROCEDURE — 25000128 H RX IP 250 OP 636: Performed by: OBSTETRICS & GYNECOLOGY

## 2019-02-01 PROCEDURE — 59820 CARE OF MISCARRIAGE: CPT | Performed by: OBSTETRICS & GYNECOLOGY

## 2019-02-01 PROCEDURE — 37000009 ZZH ANESTHESIA TECHNICAL FEE, EACH ADDTL 15 MIN: Performed by: OBSTETRICS & GYNECOLOGY

## 2019-02-01 PROCEDURE — 59820 CARE OF MISCARRIAGE: CPT | Performed by: NURSE ANESTHETIST, CERTIFIED REGISTERED

## 2019-02-01 RX ORDER — IBUPROFEN 600 MG/1
600 TABLET, FILM COATED ORAL EVERY 6 HOURS PRN
Qty: 30 TABLET | Refills: 1 | Status: SHIPPED | OUTPATIENT
Start: 2019-02-01 | End: 2019-05-02

## 2019-02-01 RX ORDER — HYDROMORPHONE HYDROCHLORIDE 1 MG/ML
.3-.5 INJECTION, SOLUTION INTRAMUSCULAR; INTRAVENOUS; SUBCUTANEOUS EVERY 10 MIN PRN
Status: DISCONTINUED | OUTPATIENT
Start: 2019-02-01 | End: 2019-02-01 | Stop reason: HOSPADM

## 2019-02-01 RX ORDER — PROPOFOL 10 MG/ML
INJECTION, EMULSION INTRAVENOUS CONTINUOUS PRN
Status: DISCONTINUED | OUTPATIENT
Start: 2019-02-01 | End: 2019-02-01

## 2019-02-01 RX ORDER — KETOROLAC TROMETHAMINE 30 MG/ML
30 INJECTION, SOLUTION INTRAMUSCULAR; INTRAVENOUS ONCE
Status: COMPLETED | OUTPATIENT
Start: 2019-02-01 | End: 2019-02-01

## 2019-02-01 RX ORDER — CEFAZOLIN SODIUM 1 G/50ML
1 INJECTION, SOLUTION INTRAVENOUS SEE ADMIN INSTRUCTIONS
Status: CANCELLED | OUTPATIENT
Start: 2019-02-01

## 2019-02-01 RX ORDER — CEFAZOLIN SODIUM 2 G/100ML
2 INJECTION, SOLUTION INTRAVENOUS
Status: COMPLETED | OUTPATIENT
Start: 2019-02-01 | End: 2019-02-01

## 2019-02-01 RX ORDER — SODIUM CHLORIDE, SODIUM LACTATE, POTASSIUM CHLORIDE, CALCIUM CHLORIDE 600; 310; 30; 20 MG/100ML; MG/100ML; MG/100ML; MG/100ML
INJECTION, SOLUTION INTRAVENOUS CONTINUOUS
Status: DISCONTINUED | OUTPATIENT
Start: 2019-02-01 | End: 2019-02-01 | Stop reason: HOSPADM

## 2019-02-01 RX ORDER — FENTANYL CITRATE 50 UG/ML
25-50 INJECTION, SOLUTION INTRAMUSCULAR; INTRAVENOUS
Status: DISCONTINUED | OUTPATIENT
Start: 2019-02-01 | End: 2019-02-01 | Stop reason: HOSPADM

## 2019-02-01 RX ORDER — LIDOCAINE 40 MG/G
CREAM TOPICAL
Status: DISCONTINUED | OUTPATIENT
Start: 2019-02-01 | End: 2019-02-01 | Stop reason: HOSPADM

## 2019-02-01 RX ORDER — ONDANSETRON 2 MG/ML
4 INJECTION INTRAMUSCULAR; INTRAVENOUS EVERY 30 MIN PRN
Status: DISCONTINUED | OUTPATIENT
Start: 2019-02-01 | End: 2019-02-01 | Stop reason: HOSPADM

## 2019-02-01 RX ORDER — ONDANSETRON 4 MG/1
4 TABLET, ORALLY DISINTEGRATING ORAL EVERY 30 MIN PRN
Status: DISCONTINUED | OUTPATIENT
Start: 2019-02-01 | End: 2019-02-01 | Stop reason: HOSPADM

## 2019-02-01 RX ORDER — LIDOCAINE HYDROCHLORIDE 20 MG/ML
INJECTION, SOLUTION INFILTRATION; PERINEURAL PRN
Status: DISCONTINUED | OUTPATIENT
Start: 2019-02-01 | End: 2019-02-01

## 2019-02-01 RX ORDER — NALOXONE HYDROCHLORIDE 0.4 MG/ML
.1-.4 INJECTION, SOLUTION INTRAMUSCULAR; INTRAVENOUS; SUBCUTANEOUS
Status: DISCONTINUED | OUTPATIENT
Start: 2019-02-01 | End: 2019-02-01 | Stop reason: HOSPADM

## 2019-02-01 RX ORDER — ONDANSETRON 4 MG/1
4 TABLET, ORALLY DISINTEGRATING ORAL
Status: DISCONTINUED | OUTPATIENT
Start: 2019-02-01 | End: 2019-02-01 | Stop reason: HOSPADM

## 2019-02-01 RX ORDER — KETAMINE HYDROCHLORIDE 50 MG/ML
INJECTION, SOLUTION INTRAMUSCULAR; INTRAVENOUS PRN
Status: DISCONTINUED | OUTPATIENT
Start: 2019-02-01 | End: 2019-02-01

## 2019-02-01 RX ORDER — FENTANYL CITRATE 50 UG/ML
INJECTION, SOLUTION INTRAMUSCULAR; INTRAVENOUS PRN
Status: DISCONTINUED | OUTPATIENT
Start: 2019-02-01 | End: 2019-02-01

## 2019-02-01 RX ORDER — KETOROLAC TROMETHAMINE 30 MG/ML
30 INJECTION, SOLUTION INTRAMUSCULAR; INTRAVENOUS ONCE
Status: CANCELLED | OUTPATIENT
Start: 2019-02-01 | End: 2019-02-01

## 2019-02-01 RX ORDER — IBUPROFEN 200 MG
600 TABLET ORAL
Status: DISCONTINUED | OUTPATIENT
Start: 2019-02-01 | End: 2019-02-01 | Stop reason: HOSPADM

## 2019-02-01 RX ORDER — HYDROCODONE BITARTRATE AND ACETAMINOPHEN 5; 325 MG/1; MG/1
1 TABLET ORAL
Status: DISCONTINUED | OUTPATIENT
Start: 2019-02-01 | End: 2019-02-01 | Stop reason: HOSPADM

## 2019-02-01 RX ORDER — PROPOFOL 10 MG/ML
INJECTION, EMULSION INTRAVENOUS PRN
Status: DISCONTINUED | OUTPATIENT
Start: 2019-02-01 | End: 2019-02-01

## 2019-02-01 RX ORDER — CEFAZOLIN SODIUM 2 G/100ML
2 INJECTION, SOLUTION INTRAVENOUS
Status: CANCELLED | OUTPATIENT
Start: 2019-02-01

## 2019-02-01 RX ORDER — ONDANSETRON 2 MG/ML
INJECTION INTRAMUSCULAR; INTRAVENOUS PRN
Status: DISCONTINUED | OUTPATIENT
Start: 2019-02-01 | End: 2019-02-01

## 2019-02-01 RX ORDER — CEFAZOLIN SODIUM 1 G/3ML
1 INJECTION, POWDER, FOR SOLUTION INTRAMUSCULAR; INTRAVENOUS SEE ADMIN INSTRUCTIONS
Status: DISCONTINUED | OUTPATIENT
Start: 2019-02-01 | End: 2019-02-01 | Stop reason: HOSPADM

## 2019-02-01 RX ORDER — MEPERIDINE HYDROCHLORIDE 50 MG/ML
12.5 INJECTION INTRAMUSCULAR; INTRAVENOUS; SUBCUTANEOUS
Status: DISCONTINUED | OUTPATIENT
Start: 2019-02-01 | End: 2019-02-01 | Stop reason: HOSPADM

## 2019-02-01 RX ADMIN — KETOROLAC TROMETHAMINE 30 MG: 30 INJECTION, SOLUTION INTRAMUSCULAR; INTRAVENOUS at 12:02

## 2019-02-01 RX ADMIN — PROPOFOL 100 MCG/KG/MIN: 10 INJECTION, EMULSION INTRAVENOUS at 12:22

## 2019-02-01 RX ADMIN — SODIUM CHLORIDE, SODIUM LACTATE, POTASSIUM CHLORIDE, AND CALCIUM CHLORIDE: 600; 310; 30; 20 INJECTION, SOLUTION INTRAVENOUS at 12:02

## 2019-02-01 RX ADMIN — MIDAZOLAM 2 MG: 1 INJECTION INTRAMUSCULAR; INTRAVENOUS at 12:21

## 2019-02-01 RX ADMIN — ONDANSETRON 4 MG: 2 INJECTION INTRAMUSCULAR; INTRAVENOUS at 12:22

## 2019-02-01 RX ADMIN — KETAMINE HYDROCHLORIDE 25 MG: 50 INJECTION, SOLUTION INTRAMUSCULAR; INTRAVENOUS at 12:21

## 2019-02-01 RX ADMIN — CEFAZOLIN SODIUM 2 G: 2 INJECTION, SOLUTION INTRAVENOUS at 12:16

## 2019-02-01 RX ADMIN — LIDOCAINE HYDROCHLORIDE 80 MG: 20 INJECTION, SOLUTION INFILTRATION; PERINEURAL at 12:22

## 2019-02-01 RX ADMIN — PROPOFOL 60 MG: 10 INJECTION, EMULSION INTRAVENOUS at 12:22

## 2019-02-01 RX ADMIN — FENTANYL CITRATE 50 MCG: 50 INJECTION, SOLUTION INTRAMUSCULAR; INTRAVENOUS at 12:21

## 2019-02-01 RX ADMIN — FENTANYL CITRATE 50 MCG: 50 INJECTION, SOLUTION INTRAMUSCULAR; INTRAVENOUS at 12:24

## 2019-02-01 ASSESSMENT — PAIN SCALES - GENERAL: PAINLEVEL: SEVERE PAIN (7)

## 2019-02-01 ASSESSMENT — LIFESTYLE VARIABLES: TOBACCO_USE: 1

## 2019-02-01 NOTE — OR NURSING
Patient has been discharged to home at 1400 via ambulatory accompanied by S.O.     Written discharge instructions were provided to patient.  Prescriptions were sent to [facility] pharmacy. Patient denies pain or nausea upon discharge.      Patient and adult caring for them verbalize understanding of discharge instructions including no driving until tomorrow and no longer taking narcotic pain medications - no operating mechanical equipment and no making any important decisions.They understand reason for discharge, and necessary follow-up appointments.        Lori Villa RN

## 2019-02-01 NOTE — NURSING NOTE
Chief Complaint   Patient presents with     RECHECK     Miscarriage    Follow up ER, patient states she has had increased bleeding and pain along with fever.   Jenn Johnson LPN........................2/1/2019  10:04 AM     Medication Reconciliation: completed   Jenn Johnson LPN  2/1/2019 10:04 AM

## 2019-02-01 NOTE — LETTER
February 1, 2019      Alize Clifton  [address] [address] [address]        To Whom It May Concern:    Alize Clifton  was seen on 2/1/19.  Please excuse her from work until 2/4/19 due to surgery.        Sincerely,            Dr. Cody Jenkins

## 2019-02-01 NOTE — ANESTHESIA CARE TRANSFER NOTE
Patient: Alize Clifton    Procedure(s):  DILATION AND CURETTAGE SUCTION    Diagnosis: inevitable   Diagnosis Additional Information: No value filed.    Anesthesia Type:   MAC     Note:  Airway :Room Air  Patient transferred to:Phase II  Handoff Report: Identifed the Patient, Identified the Reponsible Provider, Reviewed the pertinent medical history, Discussed the surgical course, Reviewed Intra-OP anesthesia mangement and issues during anesthesia, Set expectations for post-procedure period and Allowed opportunity for questions and acknowledgement of understanding      Vitals: (Last set prior to Anesthesia Care Transfer)    CRNA VITALS  2019 1216 - 2019 1249      2019             Resp Rate (set):  10                Electronically Signed By: ROB SEE CRNA  2019  12:49 PM

## 2019-02-01 NOTE — DISCHARGE INSTRUCTIONS
[facility]   Adult Discharge Orders & Instructions     For 24 hours after surgery    1. Get plenty of rest.  A responsible adult must stay with you for at least 24 hours after you leave the hospital.   2. Do not drive or use heavy equipment.  If you have weakness or tingling, don't drive or use heavy equipment until this feeling goes away.  3. Do not drink alcohol.  4. Avoid strenuous or risky activities.  Ask for help when climbing stairs.   5. You may feel lightheaded.  IF so, sit for a few minutes before standing.  Have someone help you get up.   6. If you have nausea (feel sick to your stomach): Drink only clear liquids such as apple juice, ginger ale, broth or 7-Up.  Rest may also help.  Be sure to drink enough fluids.  Move to a regular diet as you feel able.  7. You may have a slight fever. Call the doctor if your fever is over 101 F (38.3 C) (taken under the tongue) or lasts longer than 24 hours.  8. You may have a dry mouth, a sore throat, muscle aches or trouble sleeping.  These should go away after 24 hours.  9. Do not make important or legal decisions.   Call your doctor for any of the followin.  Signs of infection (fever, growing tenderness at the surgery site, a large amount of drainage or bleeding, severe pain, foul-smelling drainage, redness, swelling).    2. It has been over 8 to 10 hours since surgery and you are still not able to urinate (pass water).    3.  Headache for over 24 hours.    4.  Numbness, tingling or weakness the day after surgery (if you had spinal anesthesia).  To contact a doctor, call    [phone]___________________________

## 2019-02-01 NOTE — PROGRESS NOTES
Alize is seen for follow-up.  Since our visit 4 days ago her bleeding has increased.  She went back to the emergency department and had a follow-up hCG which significantly decreased from 21,000 down to 10,000.  Bleeding has been intermittent some with clots.  Has had a temp up to 101.  Feels a moderate amount of cramping.  No obvious passage of tissue.  New line again this is patient's probable third first trimester miscarriage.  Thus far has not had any evaluation.    Ongoing medical issues: None    Past surgical history: Tonsils and adenoids, no anesthetic issues    Rh +    Allergies: Azithromycin social history: , works at [employer] living    Family history: Both the patient's mother and mother-in-law had multiple miscarriages in the reproductive history.    Tobacco: Quit at the beginning of pregnancy new    General, GYN, , GI review of systems otherwise negative    Physical exam:  Patient alert orientated x3 appears no distress  Blood pressure 110/70, pulse 100, temp 99.6  Chest clear to auscultation cardiac regular rate and rhythm  Abdomen: Soft mild tenderness in lower abdomen no peritoneal signs  Pelvic: Speculum exam shows a bloody mucus coming from the cervix which is dilated about 1 cm.  Slight odor to this.  Uterus is 8 weeks size moderately tender  Adnexa negative    Assessment:  1.  Inevitable miscarriage, possibly early infection.  2.  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  We will proceed with suction D&C today.  [facility] for prophylaxis.  Briefly reviewed workup for repetitive miscarriages.  Will address in more detail at 2-week follow-up.

## 2019-02-01 NOTE — ANESTHESIA POSTPROCEDURE EVALUATION
Patient: Alize Clifton    Procedure(s):  DILATION AND CURETTAGE SUCTION    Diagnosis:inevitable   Diagnosis Additional Information: No value filed.    Anesthesia Type:  MAC    Note:  Anesthesia Post Evaluation    Patient location during evaluation: Phase 2  Patient participation: Able to fully participate in evaluation  Level of consciousness: awake and alert  Pain management: adequate  Airway patency: patent  Cardiovascular status: acceptable  Respiratory status: acceptable  Hydration status: acceptable  PONV: none     Anesthetic complications: None          Last vitals:  Vitals:    19 1130 19 1248 19 1300   BP: 131/77 108/70 104/68   Pulse: 90  90   Resp: 18     Temp: 99.1  F (37.3  C) 97.5  F (36.4  C)    SpO2: 97% 98%          Electronically Signed By: ROB SEE CRNA  2019  1:13 PM

## 2019-02-01 NOTE — PROGRESS NOTES
"Date of Surgery: 2/1/19  Type of Surgery: Suction D and C  Surgeon: Dr. Cody Jenkins    Patient's consents were signed and appropriate appointments were scheduled by the Unit [facility] . Patient was given surgical folder which includes pre-operative bathing instructions related to the two packets of Hibiclens surgical prep provided. Copies made and kept for informative purposes, and originals were delivered to Day-surgery. Patient denies questions at this time.      STOP BANG    Fever/Chills or other infectious symptoms in past month? Yes  >10 pound weight loss in the past 2 months? No  Health Care Directive on file? No  History of blood transfusions? No  Td up to date? Yes  History of VRE/MRSA? No      Obstructive Sleep Apnea screening    Preoperative Evaluation: Obstructive Sleep Apnea screening    S: Snore -  Do you snore loudly? (louder than talking or loud enough to be heard through closed doors)No  T: Tired - Do you often feel tired, fatigued, or sleepy during the daytime?No  O: Observed - Has anyone ever observed you stop breathing during your sleep?No  P: Pressure - Do you have or are you being treated for high blood pressure?No  B: BMI - BMI greater than 35kg/m2?No  A: Age - Age over 50 years old?No  N: Neck - Neck circumference greater than 40 cm?No  G: Gender - Gender: Male?No    Total number of \"YES\" responses:  0    Scoring: Low risk of PRATEEK 0-2  At Risk of PRATEEK: >3 High Risk of PRATEEK: 5-8    Total yes answers in PRATEEK section:    Low risk 0-2  At risk 3-4  High risk 5-8    Daniella Owens............. 2/1/2019 10:56 AM     "

## 2019-02-01 NOTE — ANESTHESIA PREPROCEDURE EVALUATION
Anesthesia Pre-Procedure Evaluation    Patient: Alize Clifton   MRN: [id number] : 1995          Preoperative Diagnosis: inevitable     Procedure(s):  DILATION AND CURETTAGE SUCTION    No past medical history on file.  History reviewed. No pertinent surgical history.    Anesthesia Evaluation     . Pt has not had prior anesthetic            ROS/MED HX    ENT/Pulmonary:     (+)tobacco use, Current use , . .    Neurologic:  - neg neurologic ROS     Cardiovascular:  - neg cardiovascular ROS       METS/Exercise Tolerance:  >4 METS   Hematologic:  - neg hematologic  ROS       Musculoskeletal:  - neg musculoskeletal ROS       GI/Hepatic:  - neg GI/hepatic ROS       Renal/Genitourinary:  - ROS Renal section negative       Endo:  - neg endo ROS       Psychiatric:  - neg psychiatric ROS       Infectious Disease:  - neg infectious disease ROS       Malignancy:      - no malignancy   Other:    - neg other ROS                      Physical Exam  Normal systems: cardiovascular, pulmonary and dental    Airway   Mallampati: II  TM distance: >3 FB  Neck ROM: full    Dental     Cardiovascular   Rhythm and rate: regular and normal      Pulmonary    breath sounds clear to auscultation            Lab Results   Component Value Date    WBC 8.6 2019    HGB 12.5 2019    HCT 37.9 2019     2019     10/26/2018    POTASSIUM 4.5 10/26/2018    CHLORIDE 107 10/26/2018    CO2 25 10/26/2018    BUN 13 10/26/2018    CR 0.74 10/26/2018    GLC 96 10/26/2018    ADRIAN 9.6 10/26/2018    ALBUMIN 4.0 10/26/2018    PROTTOTAL 7.0 10/26/2018    ALT 7 10/26/2018    AST 9 (L) 10/26/2018    ALKPHOS 49 10/26/2018    BILITOTAL 0.3 10/26/2018    LIPASE 16 10/26/2018    HCG Positive (A) 2019       Preop Vitals  BP Readings from Last 3 Encounters:   19 131/77   19 110/70   19 127/67    Pulse Readings from Last 3 Encounters:   19 90   19 100   19 59      Resp Readings from  "Last 3 Encounters:   02/01/19 18   02/01/19 16   01/28/19 12    SpO2 Readings from Last 3 Encounters:   02/01/19 97%   01/28/19 98%   01/27/19 100%      Temp Readings from Last 1 Encounters:   02/01/19 99.1  F (37.3  C) (Temporal)    Ht Readings from Last 1 Encounters:   01/28/19 1.702 m (5' 7\")      Wt Readings from Last 1 Encounters:   02/01/19 99.8 kg (220 lb)    Estimated body mass index is 34.46 kg/m  as calculated from the following:    Height as of 1/28/19: 1.702 m (5' 7\").    Weight as of this encounter: 99.8 kg (220 lb).       Anesthesia Plan      History & Physical Review      ASA Status:  2 .    NPO Status:  > 6 hours    Plan for MAC with Propofol induction.          Postoperative Care      Consents  Anesthetic plan, risks, benefits and alternatives discussed with:  Patient..                 ORB SEE CRNA  "

## 2019-02-01 NOTE — BRIEF OP NOTE
[facility] Brief Operative Note    Pre-operative diagnosis: inevitable    Post-operative diagnosis inevitable ab     Procedure: Procedure(s):  DILATION AND CURETTAGE SUCTION   Surgeon(s): Surgeon(s) and Role:     * Cody Jenkins MD - Primary     * Michelle Matson MD - Assisting   Estimated blood loss: 40 mL    Specimens: ID Type Source Tests Collected by Time Destination   A : Products of Conception Products of Conception Uterus SURGICAL PATHOLOGY EXAM Cody Jenkins MD 2019 12:37 PM       Findings: 8 week size uterus

## 2019-02-02 NOTE — OP NOTE
Preoperative Diagnosis: Inevitable AB, possible early infection  Postoperative Diagnosis: Same  Procedure: Suction D&C  Surgeon: Cody Jenkins MD  Assistant Surgeon: Dr. Michelle Matson    Clinical History: 23-year-old  3 para 0 with a history of 2 previous first trimester miscarriages.  Had bleeding beginning a week ago at 6-1/2 weeks.  Initial ultrasound showed viability but bleeding has continued.  Now with increased bleeding passing clots, cervical dilation and low-grade temp of up to 101 at home.  Uterus tender on exam.  Decision made to proceed with D&C with prophylactic antibiotics.    Findings: 8-week size uterus, cervix dilated to about 10 mm.  Mild odor to products of conception.    Operative Report: Patient was taken to the OR received a MAC anesthesia was prepped and draped in a dorsolithotomy position.  Timeout performed  Weighted speculum was placed, tenaculum placed anteriorly on the cervix and Hegar dilators used to confirm dilation up to 10 mm.  Using a #8 curved suction curette at routine settings the uterine cavity was systematically curetted of a large amount of products of conception.  Gentle curettage was continued circumferentially until it was felt that all products of conception had been removed.    Patient tolerated the procedure well and was taken to recovery in stable condition:    EBL: Less than 50 cc    Complications: None apparent

## 2019-02-15 ENCOUNTER — OFFICE VISIT (OUTPATIENT)
Dept: OBGYN | Facility: OTHER | Age: 24
End: 2019-02-15
Attending: OBSTETRICS & GYNECOLOGY
Payer: COMMERCIAL

## 2019-02-15 VITALS
DIASTOLIC BLOOD PRESSURE: 76 MMHG | SYSTOLIC BLOOD PRESSURE: 124 MMHG | RESPIRATION RATE: 18 BRPM | HEART RATE: 84 BPM | TEMPERATURE: 98.5 F | OXYGEN SATURATION: 97 % | WEIGHT: 227.6 LBS | BODY MASS INDEX: 35.65 KG/M2

## 2019-02-15 DIAGNOSIS — N96 HISTORY OF RECURRENT MISCARRIAGES: Primary | ICD-10-CM

## 2019-02-15 PROCEDURE — 86146 BETA-2 GLYCOPROTEIN ANTIBODY: CPT | Performed by: OBSTETRICS & GYNECOLOGY

## 2019-02-15 PROCEDURE — 00000401 ZZHCL STATISTIC THROMBIN TIME NC: Performed by: OBSTETRICS & GYNECOLOGY

## 2019-02-15 PROCEDURE — 86147 CARDIOLIPIN ANTIBODY EA IG: CPT | Performed by: OBSTETRICS & GYNECOLOGY

## 2019-02-15 PROCEDURE — G0463 HOSPITAL OUTPT CLINIC VISIT: HCPCS

## 2019-02-15 PROCEDURE — 36415 COLL VENOUS BLD VENIPUNCTURE: CPT | Performed by: OBSTETRICS & GYNECOLOGY

## 2019-02-15 PROCEDURE — 00000167 ZZHCL STATISTIC INR NC: Performed by: OBSTETRICS & GYNECOLOGY

## 2019-02-15 PROCEDURE — 99204 OFFICE O/P NEW MOD 45 MIN: CPT | Performed by: OBSTETRICS & GYNECOLOGY

## 2019-02-15 PROCEDURE — 85613 RUSSELL VIPER VENOM DILUTED: CPT | Performed by: OBSTETRICS & GYNECOLOGY

## 2019-02-15 PROCEDURE — 85730 THROMBOPLASTIN TIME PARTIAL: CPT | Performed by: OBSTETRICS & GYNECOLOGY

## 2019-02-15 ASSESSMENT — PAIN SCALES - GENERAL: PAINLEVEL: NO PAIN (0)

## 2019-02-15 NOTE — PROGRESS NOTES
Alize is here for follow-up for her suction D&C.  Unfortunately she has now had 3 first trimester miscarriages with no successful pregnancies.  Surgically she is doing well.  Bleeding has stopped.  She has no concerns from the procedure  Again her family history is significant for her mother having had 3 first trimester miscarriages and a second trimester loss with 2 successful pregnancies.  We reviewed workup for recurrent miscarriage which will include the following:    Anticardiolipin antibodies  Lupus anticoagulant   Beta-2 glycoprotein  Hysterosonogram to rule out a uterine septum    If the above are normal would also recommend a maternal chromosomal studies to rule out a balance translocation.    The patient is in agreement.  Lab tests will be drawn today.  She will call with a menses on a cycle that she desires a history sonogram.

## 2019-02-15 NOTE — NURSING NOTE
Chief Complaint   Patient presents with     Surgical Followup     2 week D+C     No questions or concerns at this time.    Medication Reconciliation: complete    Jerica Lopez LPN

## 2019-02-16 LAB
CARDIOLIPIN ANTIBODY IGG: <1.6 GPL-U/ML (ref 0–19.9)
CARDIOLIPIN ANTIBODY IGM: 0.3 MPL-U/ML (ref 0–19.9)

## 2019-02-18 LAB
B2 GLYCOPROT1 IGG SERPL IA-ACNC: 1 U/ML
LA PPP-IMP: NEGATIVE

## 2019-02-20 ENCOUNTER — TELEPHONE (OUTPATIENT)
Dept: OBGYN | Facility: OTHER | Age: 24
End: 2019-02-20

## 2019-03-04 DIAGNOSIS — Z01.812 PRE-PROCEDURE LAB EXAM: Primary | ICD-10-CM

## 2019-03-13 ENCOUNTER — TELEPHONE (OUTPATIENT)
Dept: OBGYN | Facility: OTHER | Age: 24
End: 2019-03-13

## 2019-03-13 ENCOUNTER — OFFICE VISIT (OUTPATIENT)
Dept: OBGYN | Facility: OTHER | Age: 24
End: 2019-03-13
Attending: OBSTETRICS & GYNECOLOGY
Payer: COMMERCIAL

## 2019-03-13 ENCOUNTER — HOSPITAL ENCOUNTER (OUTPATIENT)
Dept: ULTRASOUND IMAGING | Facility: OTHER | Age: 24
Discharge: HOME | End: 2019-03-13
Attending: OBSTETRICS & GYNECOLOGY | Admitting: OBSTETRICS & GYNECOLOGY
Payer: COMMERCIAL

## 2019-03-13 DIAGNOSIS — N96 HISTORY OF RECURRENT MISCARRIAGES: Primary | ICD-10-CM

## 2019-03-13 DIAGNOSIS — N96 HISTORY OF RECURRENT MISCARRIAGES, NOT CURRENTLY PREGNANT: Primary | ICD-10-CM

## 2019-03-13 DIAGNOSIS — Z01.812 PRE-PROCEDURE LAB EXAM: ICD-10-CM

## 2019-03-13 DIAGNOSIS — N96 HISTORY OF RECURRENT MISCARRIAGES: ICD-10-CM

## 2019-03-13 DIAGNOSIS — N96 HISTORY OF RECURRENT MISCARRIAGES, NOT CURRENTLY PREGNANT: ICD-10-CM

## 2019-03-13 DIAGNOSIS — Z34.90 PREGNANCY, UNSPECIFIED GESTATIONAL AGE: ICD-10-CM

## 2019-03-13 LAB
HCG SERPL QL: NEGATIVE
HCG UR QL: NEGATIVE

## 2019-03-13 PROCEDURE — 88289 CHROMOSOME STUDY ADDITIONAL: CPT | Performed by: OBSTETRICS & GYNECOLOGY

## 2019-03-13 PROCEDURE — 36415 COLL VENOUS BLD VENIPUNCTURE: CPT | Performed by: FAMILY MEDICINE

## 2019-03-13 PROCEDURE — 58340 CATHETER FOR HYSTEROGRAPHY: CPT

## 2019-03-13 PROCEDURE — 36415 COLL VENOUS BLD VENIPUNCTURE: CPT | Performed by: OBSTETRICS & GYNECOLOGY

## 2019-03-13 PROCEDURE — 58340 CATHETER FOR HYSTEROGRAPHY: CPT | Performed by: OBSTETRICS & GYNECOLOGY

## 2019-03-13 PROCEDURE — 88264 CHROMOSOME ANALYSIS 20-25: CPT | Performed by: OBSTETRICS & GYNECOLOGY

## 2019-03-13 PROCEDURE — 88230 TISSUE CULTURE LYMPHOCYTE: CPT | Performed by: OBSTETRICS & GYNECOLOGY

## 2019-03-13 PROCEDURE — 81025 URINE PREGNANCY TEST: CPT | Performed by: OBSTETRICS & GYNECOLOGY

## 2019-03-13 PROCEDURE — 84703 CHORIONIC GONADOTROPIN ASSAY: CPT | Performed by: FAMILY MEDICINE

## 2019-03-13 NOTE — PROGRESS NOTES
Here for sonohysterogram.  Diagnosis recurrent first trimester miscarriages (3)    Preprocedure scanning of the uterus and adnexa normal.  Routine preprocedure instructions  Timeout.  Cervix visualized Betadine prep.  Intrauterine catheter placed without difficulty.  Real-time inject him of sterile saline with transvaginal ultrasound.  Cavity symmetrical with no evidence of a septum or intrauterine mass.    Assessment:  1.  Recurrent miscarriage  2.  Normal sonohysterogram  3.  Normal antibody screens  4.  Mother of patient with history of recurrent miscarriage    Plan:  Routine post procedure instructions  Recommend proceeding with chromosomal analysis of patient to rule out a balance translocation.

## 2019-03-13 NOTE — TELEPHONE ENCOUNTER
Per Dr. Cody Jenkins MD, Patient needs to have lab for chromosome testing. Order placed.    Left message to call back  ....................Deborah Sarmiento  3/13/2019   10:44 AM

## 2019-03-13 NOTE — TELEPHONE ENCOUNTER
Patient contacted scheduling and made appointment.    Deborah Sarmiento RN...................3/13/2019 1:38 PM

## 2019-04-11 LAB — COPATH REPORT: NORMAL

## 2019-04-29 ENCOUNTER — TELEPHONE (OUTPATIENT)
Dept: OBGYN | Facility: OTHER | Age: 24
End: 2019-04-29

## 2019-04-29 NOTE — TELEPHONE ENCOUNTER
Alize left a voicemail message that Dr. Cody Jenkins wanted her to call when her next ovulation date would be so she could get an ultrasound done.  She said that she will be ovulating on 5/22/19.  There is no ultrasound order placed, so I am unsure what he would like scheduled.  Julienne Monsalve on 4/29/2019 at 11:45 AM

## 2019-05-01 NOTE — TELEPHONE ENCOUNTER
Forwarded to Unit [facility] AFR for scheduling.    Deborah Sarmiento RN...................5/1/2019 3:52 PM

## 2019-05-02 ENCOUNTER — PRENATAL OFFICE VISIT (OUTPATIENT)
Dept: OBGYN | Facility: OTHER | Age: 24
End: 2019-05-02
Attending: OBSTETRICS & GYNECOLOGY
Payer: COMMERCIAL

## 2019-05-02 VITALS
HEART RATE: 84 BPM | DIASTOLIC BLOOD PRESSURE: 70 MMHG | BODY MASS INDEX: 36.1 KG/M2 | WEIGHT: 230 LBS | SYSTOLIC BLOOD PRESSURE: 112 MMHG | HEIGHT: 67 IN

## 2019-05-02 DIAGNOSIS — Z32.01 POSITIVE PREGNANCY TEST: Primary | ICD-10-CM

## 2019-05-02 DIAGNOSIS — N96 HISTORY OF RECURRENT MISCARRIAGES: ICD-10-CM

## 2019-05-02 LAB
B-HCG SERPL-ACNC: 110 IU/L
PROGEST SERPL-MCNC: 10.1 NG/ML

## 2019-05-02 PROCEDURE — 84702 CHORIONIC GONADOTROPIN TEST: CPT | Mod: ZL | Performed by: OBSTETRICS & GYNECOLOGY

## 2019-05-02 PROCEDURE — 99212 OFFICE O/P EST SF 10 MIN: CPT | Performed by: OBSTETRICS & GYNECOLOGY

## 2019-05-02 PROCEDURE — 84144 ASSAY OF PROGESTERONE: CPT | Mod: ZL | Performed by: OBSTETRICS & GYNECOLOGY

## 2019-05-02 PROCEDURE — 36415 COLL VENOUS BLD VENIPUNCTURE: CPT | Mod: ZL | Performed by: OBSTETRICS & GYNECOLOGY

## 2019-05-02 PROCEDURE — G0463 HOSPITAL OUTPT CLINIC VISIT: HCPCS

## 2019-05-02 ASSESSMENT — PAIN SCALES - GENERAL: PAINLEVEL: NO PAIN (0)

## 2019-05-02 ASSESSMENT — MIFFLIN-ST. JEOR: SCORE: 1840.9

## 2019-05-02 NOTE — RESULT ENCOUNTER NOTE
Please let Alize know that her hcg is 110 and progesterone is 10  Both are what we expected, so no changes needed to our plan  Thank you

## 2019-05-02 NOTE — NURSING NOTE
Chief Complaint   Patient presents with     Consult     Pregnancy confirmation      Positive pregnancy test fertility patient  LMP 4/4/19  Medication Reconciliation: completed   Jenn Johnson LPN  5/2/2019 8:29 AM

## 2019-05-02 NOTE — PROGRESS NOTES
Alize is here today as a bit of surprise.  We had set her up for a follicle study this coming cycle but she ended up last night with a positive pregnancy test.  4 weeks out from her LMP is.  Again has a history of recurrent miscarriage.  Thrombophilia work-up, sonohysterogram and maternal chromosomes are all normal.  We had planned on doing a follicle study endometrial lining regiment at the time of ovulation to see if this potentially is under developed endometrium.  Now that she is pregnant we are going to move forward with an empiric treatment program.  Check a quantitative hCG and progesterone today.  Empirically start on Prometrium 200 mg twice a day.  Also discussed pros and cons of low-dose aspirin and patient would like to initiate.    We will plan a repeat hCG in 4 to 5 days.  All questions answered

## 2019-05-03 ENCOUNTER — HEALTH MAINTENANCE LETTER (OUTPATIENT)
Age: 24
End: 2019-05-03

## 2019-05-07 DIAGNOSIS — Z32.01 POSITIVE PREGNANCY TEST: ICD-10-CM

## 2019-05-07 DIAGNOSIS — N96 HISTORY OF RECURRENT MISCARRIAGES: ICD-10-CM

## 2019-05-07 LAB — B-HCG SERPL-ACNC: 1081 IU/L

## 2019-05-07 PROCEDURE — 36415 COLL VENOUS BLD VENIPUNCTURE: CPT | Performed by: OBSTETRICS & GYNECOLOGY

## 2019-05-07 PROCEDURE — 84702 CHORIONIC GONADOTROPIN TEST: CPT | Performed by: OBSTETRICS & GYNECOLOGY

## 2019-05-08 DIAGNOSIS — O09.299 HISTORY OF MISCARRIAGE, CURRENTLY PREGNANT: Primary | ICD-10-CM

## 2019-05-14 DIAGNOSIS — O09.299 HISTORY OF MISCARRIAGE, CURRENTLY PREGNANT: ICD-10-CM

## 2019-05-14 LAB — B-HCG SERPL-ACNC: NORMAL IU/L

## 2019-05-14 PROCEDURE — 36415 COLL VENOUS BLD VENIPUNCTURE: CPT | Performed by: OBSTETRICS & GYNECOLOGY

## 2019-05-14 PROCEDURE — 84702 CHORIONIC GONADOTROPIN TEST: CPT | Performed by: OBSTETRICS & GYNECOLOGY

## 2019-05-15 DIAGNOSIS — O36.80X0 ENCOUNTER TO DETERMINE FETAL VIABILITY OF PREGNANCY, SINGLE OR UNSPECIFIED FETUS: Primary | ICD-10-CM

## 2019-05-22 ENCOUNTER — HOSPITAL ENCOUNTER (OUTPATIENT)
Dept: ULTRASOUND IMAGING | Facility: OTHER | Age: 24
Discharge: HOME | End: 2019-05-22
Attending: OBSTETRICS & GYNECOLOGY | Admitting: OBSTETRICS & GYNECOLOGY
Payer: COMMERCIAL

## 2019-05-22 DIAGNOSIS — O36.80X0 ENCOUNTER TO DETERMINE FETAL VIABILITY OF PREGNANCY, SINGLE OR UNSPECIFIED FETUS: ICD-10-CM

## 2019-05-22 PROCEDURE — 76817 TRANSVAGINAL US OBSTETRIC: CPT

## 2019-05-23 ENCOUNTER — HOSPITAL ENCOUNTER (EMERGENCY)
Facility: OTHER | Age: 24
Discharge: HOME | End: 2019-05-23
Attending: FAMILY MEDICINE | Admitting: FAMILY MEDICINE
Payer: COMMERCIAL

## 2019-05-23 ENCOUNTER — PRENATAL OFFICE VISIT (OUTPATIENT)
Dept: OBGYN | Facility: OTHER | Age: 24
End: 2019-05-23
Attending: OBSTETRICS & GYNECOLOGY
Payer: COMMERCIAL

## 2019-05-23 VITALS
HEART RATE: 72 BPM | HEIGHT: 67 IN | DIASTOLIC BLOOD PRESSURE: 60 MMHG | BODY MASS INDEX: 36.41 KG/M2 | SYSTOLIC BLOOD PRESSURE: 110 MMHG | WEIGHT: 232 LBS

## 2019-05-23 VITALS
WEIGHT: 232 LBS | TEMPERATURE: 98 F | DIASTOLIC BLOOD PRESSURE: 87 MMHG | RESPIRATION RATE: 16 BRPM | SYSTOLIC BLOOD PRESSURE: 144 MMHG | HEART RATE: 94 BPM | OXYGEN SATURATION: 100 % | HEIGHT: 67 IN | BODY MASS INDEX: 36.41 KG/M2

## 2019-05-23 DIAGNOSIS — R30.0 DYSURIA: ICD-10-CM

## 2019-05-23 DIAGNOSIS — O36.80X0 ENCOUNTER TO DETERMINE FETAL VIABILITY OF PREGNANCY, SINGLE OR UNSPECIFIED FETUS: ICD-10-CM

## 2019-05-23 DIAGNOSIS — Z3A.01 7 WEEKS GESTATION OF PREGNANCY: Primary | ICD-10-CM

## 2019-05-23 DIAGNOSIS — N96 HISTORY OF RECURRENT MISCARRIAGES: ICD-10-CM

## 2019-05-23 LAB
ABO + RH BLD: NORMAL
ABO + RH BLD: NORMAL
ALBUMIN SERPL-MCNC: 4 G/DL (ref 3.5–5.7)
ALBUMIN UR-MCNC: ABNORMAL MG/DL
ALP SERPL-CCNC: 49 U/L (ref 34–104)
ALT SERPL W P-5'-P-CCNC: 10 U/L (ref 7–52)
ANION GAP SERPL CALCULATED.3IONS-SCNC: 7 MMOL/L (ref 3–14)
APPEARANCE UR: CLEAR
AST SERPL W P-5'-P-CCNC: 11 U/L (ref 13–39)
B-HCG SERPL-ACNC: NORMAL IU/L
BACTERIA #/AREA URNS HPF: ABNORMAL /HPF
BASOPHILS # BLD AUTO: 0.1 10E9/L (ref 0–0.2)
BASOPHILS NFR BLD AUTO: 0.5 %
BILIRUB SERPL-MCNC: 0.2 MG/DL (ref 0.3–1)
BILIRUB UR QL STRIP: NEGATIVE
BLD GP AB SCN SERPL QL: NORMAL
BLOOD BANK CMNT PATIENT-IMP: NORMAL
BUN SERPL-MCNC: 11 MG/DL (ref 7–25)
CALCIUM SERPL-MCNC: 9.1 MG/DL (ref 8.6–10.3)
CHLORIDE SERPL-SCNC: 104 MMOL/L (ref 98–107)
CO2 SERPL-SCNC: 23 MMOL/L (ref 21–31)
COLOR UR AUTO: YELLOW
CREAT SERPL-MCNC: 0.83 MG/DL (ref 0.6–1.2)
DIFFERENTIAL METHOD BLD: NORMAL
EOSINOPHIL # BLD AUTO: 0.1 10E9/L (ref 0–0.7)
EOSINOPHIL NFR BLD AUTO: 1.3 %
ERYTHROCYTE [DISTWIDTH] IN BLOOD BY AUTOMATED COUNT: 12.8 % (ref 10–15)
GFR SERPL CREATININE-BSD FRML MDRD: 87 ML/MIN/{1.73_M2}
GLUCOSE SERPL-MCNC: 87 MG/DL (ref 70–105)
GLUCOSE UR STRIP-MCNC: NEGATIVE MG/DL
HCT VFR BLD AUTO: 37.2 % (ref 35–47)
HGB BLD-MCNC: 12.5 G/DL (ref 11.7–15.7)
HGB UR QL STRIP: NEGATIVE
IMM GRANULOCYTES # BLD: 0 10E9/L (ref 0–0.4)
IMM GRANULOCYTES NFR BLD: 0.2 %
KETONES UR STRIP-MCNC: NEGATIVE MG/DL
LEUKOCYTE ESTERASE UR QL STRIP: NEGATIVE
LIPASE SERPL-CCNC: 12 U/L (ref 11–82)
LYMPHOCYTES # BLD AUTO: 2.5 10E9/L (ref 0.8–5.3)
LYMPHOCYTES NFR BLD AUTO: 25.4 %
MCH RBC QN AUTO: 28.5 PG (ref 26.5–33)
MCHC RBC AUTO-ENTMCNC: 33.6 G/DL (ref 31.5–36.5)
MCV RBC AUTO: 85 FL (ref 78–100)
MONOCYTES # BLD AUTO: 0.5 10E9/L (ref 0–1.3)
MONOCYTES NFR BLD AUTO: 5.2 %
MUCOUS THREADS #/AREA URNS LPF: PRESENT /LPF
NEUTROPHILS # BLD AUTO: 6.7 10E9/L (ref 1.6–8.3)
NEUTROPHILS NFR BLD AUTO: 67.4 %
NITRATE UR QL: NEGATIVE
NON-SQ EPI CELLS #/AREA URNS LPF: ABNORMAL /LPF
PH UR STRIP: 5.5 PH (ref 5–9)
PLATELET # BLD AUTO: 252 10E9/L (ref 150–450)
POTASSIUM SERPL-SCNC: 3.8 MMOL/L (ref 3.5–5.1)
PROT SERPL-MCNC: 7 G/DL (ref 6.4–8.9)
RBC # BLD AUTO: 4.38 10E12/L (ref 3.8–5.2)
RBC #/AREA URNS AUTO: ABNORMAL /HPF
SODIUM SERPL-SCNC: 134 MMOL/L (ref 134–144)
SOURCE: ABNORMAL
SP GR UR STRIP: >1.03 (ref 1–1.03)
SPECIMEN EXP DATE BLD: NORMAL
UROBILINOGEN UR STRIP-ACNC: 1 EU/DL (ref 0.2–1)
WBC # BLD AUTO: 9.9 10E9/L (ref 4–11)
WBC #/AREA URNS AUTO: ABNORMAL /HPF

## 2019-05-23 PROCEDURE — 99207 ZZC OB VISIT-NO CHARGE - GICH ONLY: CPT | Performed by: OBSTETRICS & GYNECOLOGY

## 2019-05-23 PROCEDURE — 86901 BLOOD TYPING SEROLOGIC RH(D): CPT | Performed by: FAMILY MEDICINE

## 2019-05-23 PROCEDURE — 86900 BLOOD TYPING SEROLOGIC ABO: CPT | Performed by: FAMILY MEDICINE

## 2019-05-23 PROCEDURE — 81001 URINALYSIS AUTO W/SCOPE: CPT | Performed by: FAMILY MEDICINE

## 2019-05-23 PROCEDURE — 36415 COLL VENOUS BLD VENIPUNCTURE: CPT | Performed by: FAMILY MEDICINE

## 2019-05-23 PROCEDURE — 99283 EMERGENCY DEPT VISIT LOW MDM: CPT | Mod: Z6 | Performed by: FAMILY MEDICINE

## 2019-05-23 PROCEDURE — 86850 RBC ANTIBODY SCREEN: CPT | Performed by: FAMILY MEDICINE

## 2019-05-23 PROCEDURE — 84702 CHORIONIC GONADOTROPIN TEST: CPT | Performed by: FAMILY MEDICINE

## 2019-05-23 PROCEDURE — 83690 ASSAY OF LIPASE: CPT | Performed by: FAMILY MEDICINE

## 2019-05-23 PROCEDURE — 80053 COMPREHEN METABOLIC PANEL: CPT | Performed by: FAMILY MEDICINE

## 2019-05-23 PROCEDURE — G0463 HOSPITAL OUTPT CLINIC VISIT: HCPCS | Performed by: OBSTETRICS & GYNECOLOGY

## 2019-05-23 PROCEDURE — 87086 URINE CULTURE/COLONY COUNT: CPT | Performed by: FAMILY MEDICINE

## 2019-05-23 PROCEDURE — 99283 EMERGENCY DEPT VISIT LOW MDM: CPT | Mod: 27 | Performed by: FAMILY MEDICINE

## 2019-05-23 PROCEDURE — 85025 COMPLETE CBC W/AUTO DIFF WBC: CPT | Performed by: FAMILY MEDICINE

## 2019-05-23 RX ORDER — FERROUS SULFATE 325(65) MG
325 TABLET ORAL
COMMUNITY
End: 2019-07-10

## 2019-05-23 RX ORDER — ASPIRIN 81 MG/1
81 TABLET ORAL DAILY
Status: ON HOLD | COMMUNITY
End: 2020-01-11

## 2019-05-23 ASSESSMENT — MIFFLIN-ST. JEOR
SCORE: 1849.98
SCORE: 1849.98

## 2019-05-23 ASSESSMENT — PAIN SCALES - GENERAL: PAINLEVEL: NO PAIN (0)

## 2019-05-23 NOTE — DISCHARGE INSTRUCTIONS
Dear Ms. Elodia,  It was nice to meet you.  As we talked, I don't see obvious signs of a bladder infection but your urine is very concentrated.  We are culturing your urine to make sure you don't have an infection but I would like you to drink 6-8 glasses of water per day and watch your urine.  If it is dark or strong smelling you need to drink more water.    Follow up on Monday in clinic for recheck of your urine, and repeat blood test to make sure your pregnancy is progressing normally.    I hope you are feeling better soon,  Sincerely,  Dr. Matthew Lux

## 2019-05-23 NOTE — NURSING NOTE
Chief Complaint   Patient presents with     Prenatal Care     OBPX LMP: 04/04/19       Medication Reconciliation: completed   Jenn Johnson LPN  5/23/2019 8:17 AM

## 2019-05-23 NOTE — LETTER
May 23, 2019      To Whom It May Concern:      Alize Clifton was seen in our Emergency Department today, 05/23/19 precluding work today.  I expect her condition to improve over the next 2 days.  She may return to work only when improved.    Sincerely,        Delbert Lux MD

## 2019-05-23 NOTE — ED TRIAGE NOTES
Pt here by herself, pt states that she is 7 weeks pregnant and started lower abdominal cramping started 2 hours ago, pt reports that she has a hx of miscarriages, will attempt to collect urine sample, pt out into waiting room to wait for Ed room, pt reports that she had an US yesterday that was normal

## 2019-05-23 NOTE — ED AVS SNAPSHOT
[facility]  [address]  [place] [address]  Phone:  [phone]  Fax:  [phone]                                    Alize Clifton   MRN: [id number]    Department:  [facility]   Date of Visit:  5/23/2019           After Visit Summary Signature Page    I have received my discharge instructions, and my questions have been answered. I have discussed any challenges I see with this plan with the nurse or doctor.    ..........................................................................................................................................  Patient/Patient Representative Signature      ..........................................................................................................................................  Patient Representative Print Name and Relationship to Patient    ..................................................               ................................................  Date                                   Time    ..........................................................................................................................................  Reviewed by Signature/Title    ...................................................              ..............................................  Date                                               Time          22EPIC Rev 08/18

## 2019-05-23 NOTE — PROGRESS NOTES
Alize is a 23-year-old  4 para 0 currently at 7 weeks by early ultrasound and dates.  History of 3 first trimester miscarriages.  D&C for 1.  Rh+.  Work-up for her miscarriages has been entirely negative.  Is currently taking empiric baby aspirin and Prometrium.  Has strong symptoms of pregnancy including nausea.  Had an ultrasound yesterday which showed a viable intrauterine pregnancy measuring 6 weeks 5 days.    Up to this point we have had multiple discussions with the patient and her  about their situation.  She is quite optimistic for this pregnancy.  She will continue the aspirin and Prometrium.  We will plan a follow-up ultrasound in 2 weeks.  If at that time everything appears normal we will proceed with completion of the new OB visit and laboratory tests.

## 2019-05-24 LAB
BACTERIA SPEC CULT: NORMAL
SPECIMEN SOURCE: NORMAL

## 2019-05-24 ASSESSMENT — ENCOUNTER SYMPTOMS
DIAPHORESIS: 0
FEVER: 0
ABDOMINAL PAIN: 1
CHILLS: 0
RESPIRATORY NEGATIVE: 1
EYES NEGATIVE: 1
CARDIOVASCULAR NEGATIVE: 1

## 2019-05-24 NOTE — ED PROVIDER NOTES
History     Chief Complaint   Patient presents with     Abdominal Pain     HPI  Alize Clifton is a 23 year old female who was just in the clinic this morning with her OB/GYN physician for early pregnancy check on aspirin and progestational support with history of recurrent spontaneous AB's had returned to work but had increasing discomfort with urination and crampy pain in her suprapubic area.  She did not mention it earlier because it was not really giving her trouble until mid-day.  She is not having any bleeding or spotting.  She states she drinks plenty of water.  No f/c/s.    Allergies:  Allergies   Allergen Reactions     Gin [Alcohol] Swelling     Eye swelling and itching      Azithromycin Rash       Problem List:    Patient Active Problem List    Diagnosis Date Noted     SAB (spontaneous ) 2016     Priority: Medium     HPV vaccine counseling 2016     Priority: Medium     Supervision of normal pregnancy in first trimester 2016     Priority: Medium     Contraceptive management 2015     Priority: Medium     TMJ pain dysfunction syndrome 2014     Priority: Medium     Abdominal pain, generalized 10/14/2009     Priority: Medium     Overview:   IMO Update 10/11       Allergic state 2007     Priority: Medium     Overview:   IMO Update 10/11          Past Medical History:    No past medical history on file.    Past Surgical History:    Past Surgical History:   Procedure Laterality Date     DILATION AND CURETTAGE SUCTION N/A 2019    Procedure: DILATION AND CURETTAGE SUCTION;  Surgeon: Cody Jenkins MD;  Location:  OR       Family History:    No family history on file.    Social History:  Marital Status:  Single [1]  Social History     Tobacco Use     Smoking status: Former Smoker     Packs/day: 0.10     Types: Cigarettes     Last attempt to quit: 2019     Years since quittin.0     Smokeless tobacco: Never Used   Substance Use Topics     Alcohol use:  "No     Drug use: Never        Medications:      aspirin 81 MG EC tablet   ferrous sulfate (FEROSUL) 325 (65 Fe) MG tablet   progesterone (PROMETRIUM) 200 MG capsule         Review of Systems   Constitutional: Negative for chills, diaphoresis and fever.   HENT: Negative.    Eyes: Negative.    Respiratory: Negative.    Cardiovascular: Negative.    Gastrointestinal: Positive for abdominal pain.   Genitourinary: Positive for pelvic pain.       Physical Exam   BP: 118/73  Pulse: 94  Temp: 98  F (36.7  C)  Resp: 16  Height: 170.2 cm (5' 7\")  Weight: 105.2 kg (232 lb)  SpO2: 100 %      Physical Exam   Constitutional: She appears well-developed and well-nourished. No distress.   Anxious 23 year old female in no significant physical distress but anxious about her pregnancy and symptoms.   HENT:   Head: Normocephalic and atraumatic.   Right Ear: External ear normal.   Left Ear: External ear normal.   Nose: Nose normal.   Mouth/Throat: Oropharynx is clear and moist.   Eyes: Pupils are equal, round, and reactive to light. Conjunctivae and EOM are normal. Right eye exhibits no discharge. Left eye exhibits no discharge. No scleral icterus.   Neck: Normal range of motion. Neck supple. No tracheal deviation present. No thyromegaly present.   Cardiovascular: Normal rate, regular rhythm and normal heart sounds.   Pulmonary/Chest: Effort normal and breath sounds normal. No respiratory distress.   Abdominal: Soft. Bowel sounds are normal. There is tenderness (minimal tenderness in the suprapubic region.).   Musculoskeletal: Normal range of motion. She exhibits no edema.   Neurological: She is alert. She exhibits normal muscle tone.   Skin: Skin is warm and dry. Capillary refill takes less than 2 seconds. She is not diaphoretic.   Psychiatric:   Anxious.   Nursing note and vitals reviewed.      ED Course        Procedures               Critical Care time:  none               Results for orders placed or performed during the hospital " encounter of 05/23/19 (from the past 24 hour(s))   *UA reflex to Microscopic   Result Value Ref Range    Color Urine Yellow     Appearance Urine Clear     Glucose Urine Negative NEG^Negative mg/dL    Bilirubin Urine Negative NEG^Negative    Ketones Urine Negative NEG^Negative mg/dL    Specific Gravity Urine >1.030 (H) 1.000 - 1.030    Blood Urine Negative NEG^Negative    pH Urine 5.5 5.0 - 9.0 pH    Protein Albumin Urine Trace (A) NEG^Negative mg/dL    Urobilinogen Urine 1.0 0.2 - 1.0 EU/dL    Nitrite Urine Negative NEG^Negative    Leukocyte Esterase Urine Negative NEG^Negative    Source Midstream Urine    Urine Microscopic   Result Value Ref Range    WBC Urine 0 - 5 OTO5^0 - 5 /HPF    RBC Urine O - 2 OTO2^O - 2 /HPF    Squamous Epithelial /LPF Urine Many (A) FEW^Few /LPF    Bacteria Urine Moderate (A) NEG^Negative /HPF    Mucous Urine Present (A) NEG^Negative /LPF   Urine Culture Aerobic Bacterial   Result Value Ref Range    Specimen Description Unspecified Urine     Culture Micro >100,000 colonies/mL  mixed urogenital zeke      CBC with platelets differential   Result Value Ref Range    WBC 9.9 4.0 - 11.0 10e9/L    RBC Count 4.38 3.8 - 5.2 10e12/L    Hemoglobin 12.5 11.7 - 15.7 g/dL    Hematocrit 37.2 35.0 - 47.0 %    MCV 85 78 - 100 fl    MCH 28.5 26.5 - 33.0 pg    MCHC 33.6 31.5 - 36.5 g/dL    RDW 12.8 10.0 - 15.0 %    Platelet Count 252 150 - 450 10e9/L    Diff Method Automated Method     % Neutrophils 67.4 %    % Lymphocytes 25.4 %    % Monocytes 5.2 %    % Eosinophils 1.3 %    % Basophils 0.5 %    % Immature Granulocytes 0.2 %    Absolute Neutrophil 6.7 1.6 - 8.3 10e9/L    Absolute Lymphocytes 2.5 0.8 - 5.3 10e9/L    Absolute Monocytes 0.5 0.0 - 1.3 10e9/L    Absolute Eosinophils 0.1 0.0 - 0.7 10e9/L    Absolute Basophils 0.1 0.0 - 0.2 10e9/L    Abs Immature Granulocytes 0.0 0 - 0.4 10e9/L   ABO/Rh type and screen   Result Value Ref Range    ABO A     RH(D) Pos     Antibody Screen Neg     Test Valid Only At  [facility]        Specimen Expires 05/26/2019    HCG quantitative pregnancy   Result Value Ref Range    HCG Quantitative Serum 62,907 IU/L   Comprehensive metabolic panel   Result Value Ref Range    Sodium 134 134 - 144 mmol/L    Potassium 3.8 3.5 - 5.1 mmol/L    Chloride 104 98 - 107 mmol/L    Carbon Dioxide 23 21 - 31 mmol/L    Anion Gap 7 3 - 14 mmol/L    Glucose 87 70 - 105 mg/dL    Urea Nitrogen 11 7 - 25 mg/dL    Creatinine 0.83 0.60 - 1.20 mg/dL    GFR Estimate 87 >60 mL/min/[1.73_m2]    GFR Estimate If Black >90 >60 mL/min/[1.73_m2]    Calcium 9.1 8.6 - 10.3 mg/dL    Bilirubin Total 0.2 (L) 0.3 - 1.0 mg/dL    Albumin 4.0 3.5 - 5.7 g/dL    Protein Total 7.0 6.4 - 8.9 g/dL    Alkaline Phosphatase 49 34 - 104 U/L    ALT 10 7 - 52 U/L    AST 11 (L) 13 - 39 U/L   Lipase   Result Value Ref Range    Lipase 12 11 - 82 U/L       Medications - No data to display    Assessments & Plan (with Medical Decision Making)   23 year old female with hx of miscarriages and s/p D&C in February in early pregnancy with dysuria and benign but concentrated urine.  She is counseled to drink more water and UC is pending.  Follow up in clinic next week for recheck and return as needed for any worsening symptoms.    I have reviewed the nursing notes.    I have reviewed the findings, diagnosis, plan and need for follow up with the patient.          Medication List      ASK your doctor about these medications    Prenatal Adult Gummy/DHA/FA 0.4-25 MG Chew  1 tablet, Oral, DAILY  Ask about: Should I take this medication?            Final diagnoses:   Dysuria - Urine culture pending to rule out infection; however, urine is quite concentrated and recommending increased hydration with repeat UA and quantitative serum HCG on Monday.       5/23/2019   [facility]     Delbert Lux MD  05/24/19 2020

## 2019-06-06 ENCOUNTER — HOSPITAL ENCOUNTER (OUTPATIENT)
Dept: ULTRASOUND IMAGING | Facility: OTHER | Age: 24
Discharge: HOME | End: 2019-06-06
Attending: OBSTETRICS & GYNECOLOGY | Admitting: OBSTETRICS & GYNECOLOGY
Payer: COMMERCIAL

## 2019-06-06 ENCOUNTER — PRENATAL OFFICE VISIT (OUTPATIENT)
Dept: OBGYN | Facility: OTHER | Age: 24
End: 2019-06-06
Attending: OBSTETRICS & GYNECOLOGY
Payer: COMMERCIAL

## 2019-06-06 VITALS
SYSTOLIC BLOOD PRESSURE: 118 MMHG | WEIGHT: 231 LBS | DIASTOLIC BLOOD PRESSURE: 70 MMHG | BODY MASS INDEX: 36.18 KG/M2 | HEART RATE: 80 BPM

## 2019-06-06 DIAGNOSIS — Z3A.01 7 WEEKS GESTATION OF PREGNANCY: ICD-10-CM

## 2019-06-06 DIAGNOSIS — N96 HISTORY OF RECURRENT MISCARRIAGES: ICD-10-CM

## 2019-06-06 DIAGNOSIS — O36.80X0 ENCOUNTER TO DETERMINE FETAL VIABILITY OF PREGNANCY, SINGLE OR UNSPECIFIED FETUS: ICD-10-CM

## 2019-06-06 DIAGNOSIS — Z3A.09 9 WEEKS GESTATION OF PREGNANCY: Primary | ICD-10-CM

## 2019-06-06 LAB
ALBUMIN UR-MCNC: NEGATIVE MG/DL
APPEARANCE UR: CLEAR
BILIRUB UR QL STRIP: NEGATIVE
C TRACH DNA SPEC QL PROBE+SIG AMP: NOT DETECTED
COLOR UR AUTO: YELLOW
GLUCOSE UR STRIP-MCNC: NEGATIVE MG/DL
HGB UR QL STRIP: NEGATIVE
KETONES UR STRIP-MCNC: NEGATIVE MG/DL
LEUKOCYTE ESTERASE UR QL STRIP: NEGATIVE
N GONORRHOEA DNA SPEC QL PROBE+SIG AMP: NOT DETECTED
NITRATE UR QL: NEGATIVE
PH UR STRIP: 5.5 PH (ref 5–9)
SOURCE: NORMAL
SP GR UR STRIP: 1.01 (ref 1–1.03)
SPECIMEN SOURCE: NORMAL
UROBILINOGEN UR STRIP-ACNC: 0.2 EU/DL (ref 0.2–1)

## 2019-06-06 PROCEDURE — 87340 HEPATITIS B SURFACE AG IA: CPT | Mod: ZL | Performed by: OBSTETRICS & GYNECOLOGY

## 2019-06-06 PROCEDURE — 87086 URINE CULTURE/COLONY COUNT: CPT | Mod: ZL | Performed by: OBSTETRICS & GYNECOLOGY

## 2019-06-06 PROCEDURE — 87591 N.GONORRHOEAE DNA AMP PROB: CPT | Mod: ZL | Performed by: OBSTETRICS & GYNECOLOGY

## 2019-06-06 PROCEDURE — 86780 TREPONEMA PALLIDUM: CPT | Mod: ZL | Performed by: OBSTETRICS & GYNECOLOGY

## 2019-06-06 PROCEDURE — 87389 HIV-1 AG W/HIV-1&-2 AB AG IA: CPT | Mod: ZL | Performed by: OBSTETRICS & GYNECOLOGY

## 2019-06-06 PROCEDURE — 99207 ZZC OB VISIT-NO CHARGE - GICH ONLY: CPT | Performed by: OBSTETRICS & GYNECOLOGY

## 2019-06-06 PROCEDURE — 76801 OB US < 14 WKS SINGLE FETUS: CPT

## 2019-06-06 PROCEDURE — 86762 RUBELLA ANTIBODY: CPT | Mod: ZL | Performed by: OBSTETRICS & GYNECOLOGY

## 2019-06-06 PROCEDURE — 36415 COLL VENOUS BLD VENIPUNCTURE: CPT | Mod: ZL | Performed by: OBSTETRICS & GYNECOLOGY

## 2019-06-06 PROCEDURE — 81003 URINALYSIS AUTO W/O SCOPE: CPT | Mod: ZL | Performed by: OBSTETRICS & GYNECOLOGY

## 2019-06-06 PROCEDURE — 87491 CHLMYD TRACH DNA AMP PROBE: CPT | Mod: ZL | Performed by: OBSTETRICS & GYNECOLOGY

## 2019-06-06 ASSESSMENT — PAIN SCALES - GENERAL: PAINLEVEL: NO PAIN (0)

## 2019-06-06 NOTE — PROGRESS NOTES
CC: New OB visit  HPI:  Alize Clifton is  at 9w0d based on Patient's last menstrual period was 2019 (exact date)./first trimester US today.  She notes issues of mild nausea    OB History    Para Term  AB Living   4 0 0 0 3 0   SAB TAB Ectopic Multiple Live Births   3 0 0 0 0      # Outcome Date GA Lbr Kip/2nd Weight Sex Delivery Anes PTL Lv   4 Current            3 SAB 18     SAB   FD   2 SAB      SAB   FD   1 SAB      SAB   FD     STI: (denies HSV, Hep C, Hep B, HIV, Syphilis, Chlamydia, Gonorrhea)  Last pap smear: No results found for: PAP    No past medical history on file.   has a past surgical history that includes Dilation and curettage suction (N/A, 2019).    Social History     Tobacco Use     Smoking status: Former Smoker     Packs/day: 0.10     Types: Cigarettes     Last attempt to quit: 2019     Years since quittin.0     Smokeless tobacco: Never Used   Substance Use Topics     Alcohol use: No     Drug use: Never     No family history on file.      Current Outpatient Medications   Medication     aspirin 81 MG EC tablet     ferrous sulfate (FEROSUL) 325 (65 Fe) MG tablet     Prenatal MV & Min w/FA-DHA (PRENATAL ADULT GUMMY/DHA/FA) 0.4-25 MG CHEW     progesterone (PROMETRIUM) 200 MG capsule     No current facility-administered medications for this visit.      Allergies   Allergen Reactions     Gin [Alcohol] Swelling     Eye swelling and itching      Azithromycin Rash     Immunization History   Administered Date(s) Administered     DTAP (<7y) 1999     DTaP, Unspecified 1997, 1998, 1998, 1999, 10/14/2009, 2016     FLU 6-35 months 10/09/2013     HPV9 2015, 2016, 10/24/2017     Hep B, Peds or Adolescent 1997, 1997, 1998     HepB, Unspecified 1997, 1997, 1998     Historical DTP/aP 1997, 1998, 1998     Influenza (IIV3) PF 2007, 2008, 10/13/2010,  10/12/2011, 10/10/2012, 11/30/2015     Influenza Intranasal Vaccine 10/14/2009     Influenza Not Indicated - By Hx 02/16/2007, 11/18/2008     Influenza Quad, Recombinant, p-free (RIV4) 11/21/2014, 10/24/2017, 10/16/2018     Influenza Vaccine IM 3yrs+ 4 Valent IIV4 11/21/2014, 11/30/2015, 10/24/2017, 10/16/2018     MMR 10/13/1998, 10/14/2009     Meningococcal (Menactra ) 05/19/2010, 09/29/2014     Pedvax-hib 1997, 02/02/1998, 04/08/1998, 10/13/1998     Poliovirus, inactivated (IPV) 1997, 02/02/1998, 10/13/1998     Rabavert 09/12/2016, 09/19/2016     Rabies, Unspecified 09/12/2016, 09/15/2016, 09/19/2016     TDAP Vaccine (Adacel) 10/14/2009     Varicella 04/28/1999, 10/14/2009           REVIEW OF SYSTEMS  Resp: No shortness of breath, dyspnea on exertion, cough, or hemoptysis  CV: negative  GI: negative  : negative    EXAM: /70 (BP Location: Right arm, Patient Position: Sitting, Cuff Size: Adult Large)   Pulse 80   Wt 104.8 kg (231 lb)   LMP 04/04/2019 (Exact Date)   Breastfeeding? No   BMI 36.18 kg/m    Gen: NAD  CV: RRR with normal S1, S2, no GRM  Resp: CTA Bilaterally  Abdomen: NT, ND  Extremities: No TTP, no deformity  Neuro: CN II-XII intact grossly, moves all extremities  Psych: normal affect and mentation.    I/P  (Z3A.09) 9 weeks gestation of pregnancy  (primary encounter diagnosis)  Comment: Routine prenatal education  Plan: Hepatitis B surface antigen, HIV Antigen         Antibody Combo, Rubella Antibody IgG         Quantitative, Treponema Abs w Reflex to RPR and        Titer, Urine Culture Aerobic Bacterial, *UA         reflex to Microscopic, GC/Chlamydia by PCR -         HI,GH        Continue prometrium until 12 weeks; ASA through pregnancy      US ordered for dates and r/o multiples  Discussed safety, nutrition, screening for cystic fibrosis, spina bifida, spinal muscular atrophy, quad screen, cffDNA screening as appropriate.  F/U scheduled, discussed call schedule rotation with  ROBINSON and Dr. Jenkins, general surgery.  Return visit in 1 month     Pregnancy risk factors include:  1. Recurrent miscarriage  Cody Jenkins MD   2:22 PM 6/6/2019

## 2019-06-06 NOTE — NURSING NOTE
Chief Complaint   Patient presents with     Prenatal Care   OBPX - 9W0D    Medication Reconciliation: completed   Jenn Johnson LPN  6/6/2019 2:03 PM

## 2019-06-07 ENCOUNTER — MYC MEDICAL ADVICE (OUTPATIENT)
Dept: OBGYN | Facility: OTHER | Age: 24
End: 2019-06-07

## 2019-06-07 ENCOUNTER — TELEPHONE (OUTPATIENT)
Dept: OBGYN | Facility: OTHER | Age: 24
End: 2019-06-07

## 2019-06-07 NOTE — TELEPHONE ENCOUNTER
Pt states that she had an ultrasound yesterday and that in the photos it appears as though there are multiple arms and legs. Wondering if she is pregnant with twins.

## 2019-06-07 NOTE — TELEPHONE ENCOUNTER
Returned patient's call.  Explained US results.  Patient voiced understanding and had no further questions.   Daniella Owens RN on 6/7/2019 at 3:18 PM

## 2019-06-08 ENCOUNTER — APPOINTMENT (OUTPATIENT)
Dept: ULTRASOUND IMAGING | Facility: OTHER | Age: 24
End: 2019-06-08
Attending: PHYSICIAN ASSISTANT
Payer: COMMERCIAL

## 2019-06-08 ENCOUNTER — HOSPITAL ENCOUNTER (EMERGENCY)
Facility: OTHER | Age: 24
Discharge: HOME | End: 2019-06-08
Attending: EMERGENCY MEDICINE | Admitting: EMERGENCY MEDICINE
Payer: COMMERCIAL

## 2019-06-08 VITALS
DIASTOLIC BLOOD PRESSURE: 59 MMHG | HEIGHT: 67 IN | OXYGEN SATURATION: 99 % | SYSTOLIC BLOOD PRESSURE: 116 MMHG | TEMPERATURE: 98.2 F | HEART RATE: 89 BPM | WEIGHT: 231 LBS | BODY MASS INDEX: 36.26 KG/M2 | RESPIRATION RATE: 18 BRPM

## 2019-06-08 DIAGNOSIS — Z34.90 INTRAUTERINE PREGNANCY: ICD-10-CM

## 2019-06-08 LAB
BACTERIA SPEC CULT: NORMAL
RUBV IGG SERPL IA-ACNC: 71 IU/ML
SPECIMEN SOURCE: NORMAL
T PALLIDUM AB SER QL: NONREACTIVE

## 2019-06-08 PROCEDURE — 99282 EMERGENCY DEPT VISIT SF MDM: CPT | Mod: Z6 | Performed by: EMERGENCY MEDICINE

## 2019-06-08 PROCEDURE — 76801 OB US < 14 WKS SINGLE FETUS: CPT

## 2019-06-08 PROCEDURE — 99284 EMERGENCY DEPT VISIT MOD MDM: CPT | Mod: 25 | Performed by: EMERGENCY MEDICINE

## 2019-06-08 ASSESSMENT — MIFFLIN-ST. JEOR: SCORE: 1845.44

## 2019-06-08 NOTE — ED PROVIDER NOTES
"Alize Clifton  : 1995 Age: 23 year old Sex: female MRN: [id number]    CC: Abdominal pain    HPI: Alize Clifton is a 23 year old female with no significant past medical history who presents from [facility] for ultrasound.  She has a known approximately 9-week pregnancy and presented to [facility] due to lower pelvic and lower back pain.  She was evaluated there with labs and exam, which were overall reassuring and transferred here for ultrasound examination.  She denies any vaginal bleeding, no vaginal discharge.  She has had no fevers or chills.  No trauma.    ED Course and MDM:  Abdominal pain: Known pregnancy.  Ultrasound was obtained from triage, which demonstrated a single intrauterine pregnancy at approximately 9 weeks.  With this reassuring finding I spoke with the patient, there is low concern for ectopic at this time, and no evidence of bleeding so no concern for a threatened miscarriage.  She will discharged home with plans for taking Tylenol and using warm packs as needed for pain.  She is already on a prenatal vitamin and is going to follow-up with her primary care physician    Final Clinical Impression:  Intrauterine pregnancy    Anoop Valdez MD  Internal Medicine and Emergency Medicine  12:34 PM 19      Physical Exam:  /59   Pulse 89   Temp 98.2  F (36.8  C) (Tympanic)   Resp 18   Ht 1.702 m (5' 7\")   Wt 104.8 kg (231 lb)   LMP 2019 (Exact Date)   SpO2 99%   BMI 36.18 kg/m      Gen: Awake, alert, no apparent distress  Abd: Soft, mildly tender in lower abdomen    ROS:  Focused ROS performed and noted in HPI             Anoop Valdez MD  19 1359    "

## 2019-06-08 NOTE — ED AVS SNAPSHOT
[facility]  [address]  [place] [address]  Phone:  [phone]  Fax:  [phone]                                    Alize Cilfton   MRN: [id number]    Department:  [facility]   Date of Visit:  6/8/2019           After Visit Summary Signature Page    I have received my discharge instructions, and my questions have been answered. I have discussed any challenges I see with this plan with the nurse or doctor.    ..........................................................................................................................................  Patient/Patient Representative Signature      ..........................................................................................................................................  Patient Representative Print Name and Relationship to Patient    ..................................................               ................................................  Date                                   Time    ..........................................................................................................................................  Reviewed by Signature/Title    ...................................................              ..............................................  Date                                               Time          22EPIC Rev 08/18

## 2019-06-08 NOTE — ED TRIAGE NOTES
Pt to ER with bow back cramping and abdominal cramping since last night, worsening t/o morning.  Pt is 9 weeks and 2 days pregnant. Denies vaginal drainage or spotting. Was seen in  this AM, sent to [facility] for US.  Had labs done in

## 2019-06-08 NOTE — PROGRESS NOTES
Patient was brought back to the waiting room.     Handoff procedure information verbally given to RN.

## 2019-06-10 LAB
HBV SURFACE AG SERPL QL IA: NONREACTIVE
HIV 1+2 AB+HIV1 P24 AG SERPL QL IA: NONREACTIVE

## 2019-06-23 ENCOUNTER — HOSPITAL ENCOUNTER (EMERGENCY)
Facility: OTHER | Age: 24
Discharge: HOME | End: 2019-06-23
Attending: PHYSICIAN ASSISTANT | Admitting: PHYSICIAN ASSISTANT
Payer: COMMERCIAL

## 2019-06-23 ENCOUNTER — APPOINTMENT (OUTPATIENT)
Dept: ULTRASOUND IMAGING | Facility: OTHER | Age: 24
End: 2019-06-23
Attending: PHYSICIAN ASSISTANT
Payer: COMMERCIAL

## 2019-06-23 VITALS
TEMPERATURE: 99.1 F | WEIGHT: 231 LBS | BODY MASS INDEX: 36.26 KG/M2 | HEART RATE: 76 BPM | RESPIRATION RATE: 20 BRPM | DIASTOLIC BLOOD PRESSURE: 71 MMHG | HEIGHT: 67 IN | OXYGEN SATURATION: 100 % | SYSTOLIC BLOOD PRESSURE: 114 MMHG

## 2019-06-23 DIAGNOSIS — O26.899 ABDOMINAL PAIN IN PREGNANCY: ICD-10-CM

## 2019-06-23 DIAGNOSIS — R10.9 ABDOMINAL PAIN IN PREGNANCY: ICD-10-CM

## 2019-06-23 LAB
ALBUMIN SERPL-MCNC: 3.8 G/DL (ref 3.5–5.7)
ALBUMIN UR-MCNC: NEGATIVE MG/DL
ALP SERPL-CCNC: 51 U/L (ref 34–104)
ALT SERPL W P-5'-P-CCNC: 6 U/L (ref 7–52)
ANION GAP SERPL CALCULATED.3IONS-SCNC: 7 MMOL/L (ref 3–14)
APPEARANCE UR: CLEAR
AST SERPL W P-5'-P-CCNC: 9 U/L (ref 13–39)
BASOPHILS # BLD AUTO: 0 10E9/L (ref 0–0.2)
BASOPHILS NFR BLD AUTO: 0.3 %
BILIRUB SERPL-MCNC: 0.3 MG/DL (ref 0.3–1)
BILIRUB UR QL STRIP: NEGATIVE
BUN SERPL-MCNC: 6 MG/DL (ref 7–25)
CALCIUM SERPL-MCNC: 9.3 MG/DL (ref 8.6–10.3)
CHLORIDE SERPL-SCNC: 105 MMOL/L (ref 98–107)
CO2 SERPL-SCNC: 25 MMOL/L (ref 21–31)
COLOR UR AUTO: YELLOW
CREAT SERPL-MCNC: 0.58 MG/DL (ref 0.6–1.2)
DIFFERENTIAL METHOD BLD: ABNORMAL
EOSINOPHIL # BLD AUTO: 0.1 10E9/L (ref 0–0.7)
EOSINOPHIL NFR BLD AUTO: 1.4 %
ERYTHROCYTE [DISTWIDTH] IN BLOOD BY AUTOMATED COUNT: 12.9 % (ref 10–15)
GFR SERPL CREATININE-BSD FRML MDRD: >90 ML/MIN/{1.73_M2}
GLUCOSE SERPL-MCNC: 96 MG/DL (ref 70–105)
GLUCOSE UR STRIP-MCNC: NEGATIVE MG/DL
HCT VFR BLD AUTO: 34.6 % (ref 35–47)
HGB BLD-MCNC: 11.9 G/DL (ref 11.7–15.7)
HGB UR QL STRIP: NEGATIVE
IMM GRANULOCYTES # BLD: 0 10E9/L (ref 0–0.4)
IMM GRANULOCYTES NFR BLD: 0.2 %
KETONES UR STRIP-MCNC: ABNORMAL MG/DL
LEUKOCYTE ESTERASE UR QL STRIP: NEGATIVE
LIPASE SERPL-CCNC: 10 U/L (ref 11–82)
LYMPHOCYTES # BLD AUTO: 2.5 10E9/L (ref 0.8–5.3)
LYMPHOCYTES NFR BLD AUTO: 25.5 %
MCH RBC QN AUTO: 28.7 PG (ref 26.5–33)
MCHC RBC AUTO-ENTMCNC: 34.4 G/DL (ref 31.5–36.5)
MCV RBC AUTO: 84 FL (ref 78–100)
MONOCYTES # BLD AUTO: 0.5 10E9/L (ref 0–1.3)
MONOCYTES NFR BLD AUTO: 4.6 %
NEUTROPHILS # BLD AUTO: 6.6 10E9/L (ref 1.6–8.3)
NEUTROPHILS NFR BLD AUTO: 68 %
NITRATE UR QL: NEGATIVE
PH UR STRIP: 6.5 PH (ref 5–9)
PLATELET # BLD AUTO: 216 10E9/L (ref 150–450)
POTASSIUM SERPL-SCNC: 3.7 MMOL/L (ref 3.5–5.1)
PROT SERPL-MCNC: 6.9 G/DL (ref 6.4–8.9)
RBC # BLD AUTO: 4.14 10E12/L (ref 3.8–5.2)
SODIUM SERPL-SCNC: 137 MMOL/L (ref 134–144)
SOURCE: ABNORMAL
SP GR UR STRIP: <1.005 (ref 1–1.03)
UROBILINOGEN UR STRIP-ACNC: 0.2 EU/DL (ref 0.2–1)
WBC # BLD AUTO: 9.7 10E9/L (ref 4–11)

## 2019-06-23 PROCEDURE — 76801 OB US < 14 WKS SINGLE FETUS: CPT

## 2019-06-23 PROCEDURE — 99283 EMERGENCY DEPT VISIT LOW MDM: CPT | Mod: Z6 | Performed by: PHYSICIAN ASSISTANT

## 2019-06-23 PROCEDURE — 25000132 ZZH RX MED GY IP 250 OP 250 PS 637: Performed by: PHYSICIAN ASSISTANT

## 2019-06-23 PROCEDURE — 83690 ASSAY OF LIPASE: CPT | Performed by: PHYSICIAN ASSISTANT

## 2019-06-23 PROCEDURE — 99284 EMERGENCY DEPT VISIT MOD MDM: CPT | Mod: 25 | Performed by: PHYSICIAN ASSISTANT

## 2019-06-23 PROCEDURE — 85025 COMPLETE CBC W/AUTO DIFF WBC: CPT | Performed by: PHYSICIAN ASSISTANT

## 2019-06-23 PROCEDURE — 81003 URINALYSIS AUTO W/O SCOPE: CPT | Performed by: FAMILY MEDICINE

## 2019-06-23 PROCEDURE — 76705 ECHO EXAM OF ABDOMEN: CPT

## 2019-06-23 PROCEDURE — 36415 COLL VENOUS BLD VENIPUNCTURE: CPT | Performed by: PHYSICIAN ASSISTANT

## 2019-06-23 PROCEDURE — 80053 COMPREHEN METABOLIC PANEL: CPT | Performed by: PHYSICIAN ASSISTANT

## 2019-06-23 RX ORDER — ACETAMINOPHEN 500 MG
500 TABLET ORAL ONCE
Status: COMPLETED | OUTPATIENT
Start: 2019-06-23 | End: 2019-06-23

## 2019-06-23 RX ADMIN — ACETAMINOPHEN 500 MG: 500 TABLET, FILM COATED ORAL at 20:20

## 2019-06-23 ASSESSMENT — ENCOUNTER SYMPTOMS
VOMITING: 0
ABDOMINAL PAIN: 1
DYSURIA: 0
NAUSEA: 0
FEVER: 0
SHORTNESS OF BREATH: 0

## 2019-06-23 ASSESSMENT — MIFFLIN-ST. JEOR: SCORE: 1845.44

## 2019-06-23 NOTE — ED AVS SNAPSHOT
[facility]  [address]  [place] [address]  Phone:  [phone]  Fax:  [phone]                                    Alize Clifton   MRN: [id number]    Department:  [facility]   Date of Visit:  6/23/2019           After Visit Summary Signature Page    I have received my discharge instructions, and my questions have been answered. I have discussed any challenges I see with this plan with the nurse or doctor.    ..........................................................................................................................................  Patient/Patient Representative Signature      ..........................................................................................................................................  Patient Representative Print Name and Relationship to Patient    ..................................................               ................................................  Date                                   Time    ..........................................................................................................................................  Reviewed by Signature/Title    ...................................................              ..............................................  Date                                               Time          22EPIC Rev 08/18

## 2019-06-24 ENCOUNTER — PRENATAL OFFICE VISIT (OUTPATIENT)
Dept: OBGYN | Facility: OTHER | Age: 24
End: 2019-06-24
Attending: OBSTETRICS & GYNECOLOGY
Payer: COMMERCIAL

## 2019-06-24 VITALS
BODY MASS INDEX: 36.26 KG/M2 | DIASTOLIC BLOOD PRESSURE: 78 MMHG | WEIGHT: 231.5 LBS | TEMPERATURE: 98.9 F | SYSTOLIC BLOOD PRESSURE: 112 MMHG

## 2019-06-24 DIAGNOSIS — R10.2 PELVIC PAIN IN FEMALE: Primary | ICD-10-CM

## 2019-06-24 DIAGNOSIS — Z34.90 NORMAL PREGNANCY, ANTEPARTUM: ICD-10-CM

## 2019-06-24 PROCEDURE — 99207 ZZC OB VISIT-NO CHARGE - GICH ONLY: CPT | Performed by: OBSTETRICS & GYNECOLOGY

## 2019-06-24 ASSESSMENT — PAIN SCALES - GENERAL: PAINLEVEL: NO PAIN (0)

## 2019-06-24 NOTE — PROGRESS NOTES
S: Patient presents in follow-up of work-up for right lower quadrant pain.  She presented to the emergency department complaining of significant bilateral lower quadrant pain.  Ultrasound was performed at that time showing a viable intrauterine marcus gestation and nonvisualization of the appendix was noted.  She denies any bleeding.  No dysuria.  She is having bowel movements every other day without bleeding.  She is able to eat and has a decent appetite today.  She was able to work today.  Pain is been tolerable with Tylenol.  She denies fevers.    O: /78 (BP Location: Right arm)   Temp 98.9  F (37.2  C)   Wt 105 kg (231 lb 8 oz)   LMP 04/04/2019 (Exact Date)   BMI 36.26 kg/m       General no acute distress  Abdomen soft nontender nondistended without organomegaly mass or hernia.  She is moderately obese and gravid.    Bedside ultrasound was performed demonstrating a viable marcus intrauterine gestation with fetal heart rate in the 150s.  Crown-rump length was consistent with 11 to 12 weeks gestation.  Cervical length appeared normal.    Impression and plan: Pelvic discomfort in pregnancy.  We will have her follow-up with her primary physician later this week with expectancy is the plan at this time.  She is reassured that the pregnancy continues to be viable.  She was instructed to call with worsening of her condition including nausea vomiting blood in stools fever chills or vaginal bleeding.    Of note she had negative STI screening at the commencement of her pregnancy.

## 2019-06-24 NOTE — ED PROVIDER NOTES
History     Chief Complaint   Patient presents with     Abdominal Pain     HPI  Alize Clifton is a 23 year old female who presents to the ED today with complaint of abd pain. Pt is , 3 miscarriages, currently 11 wks pregnant with prior confirmed viable IUP. No spotting or discharge today. Pain started approximately 8 hours prior, located suprapubic, RLQ and LLQ. No fevers, dysuria. This feels different than past miscarriage cramps she says, this is more sharp. Rated as 5/10.     Allergies:  Allergies   Allergen Reactions     Gin [Alcohol] Swelling     Eye swelling and itching      Azithromycin Rash       Problem List:    Patient Active Problem List    Diagnosis Date Noted     SAB (spontaneous ) 2016     Priority: Medium     HPV vaccine counseling 2016     Priority: Medium     Supervision of normal pregnancy in first trimester 2016     Priority: Medium     Contraceptive management 2015     Priority: Medium     TMJ pain dysfunction syndrome 2014     Priority: Medium     Abdominal pain, generalized 10/14/2009     Priority: Medium     Overview:   IMO Update 10/11       Allergic state 2007     Priority: Medium     Overview:   IMO Update 10/11          Past Medical History:    History reviewed. No pertinent past medical history.    Past Surgical History:    Past Surgical History:   Procedure Laterality Date     DILATION AND CURETTAGE SUCTION N/A 2019    Procedure: DILATION AND CURETTAGE SUCTION;  Surgeon: Cody Jenkins MD;  Location:  OR       Family History:    History reviewed. No pertinent family history.    Social History:  Marital Status:  Single [1]  Social History     Tobacco Use     Smoking status: Former Smoker     Packs/day: 0.10     Types: Cigarettes     Last attempt to quit: 2019     Years since quittin.1     Smokeless tobacco: Never Used   Substance Use Topics     Alcohol use: No     Drug use: Never        Medications:      aspirin 81  "MG EC tablet   Prenatal MV & Min w/FA-DHA (PRENATAL ADULT GUMMY/DHA/FA) 0.4-25 MG CHEW   progesterone (PROMETRIUM) 200 MG capsule   ferrous sulfate (FEROSUL) 325 (65 Fe) MG tablet         Review of Systems   Constitutional: Negative for fever.   Respiratory: Negative for shortness of breath.    Cardiovascular: Negative for chest pain.   Gastrointestinal: Positive for abdominal pain. Negative for nausea and vomiting.   Genitourinary: Negative for dysuria, vaginal bleeding, vaginal discharge and vaginal pain.       Physical Exam   BP: 114/71  Pulse: 76  Temp: 99.1  F (37.3  C)  Resp: 20  Height: 170.2 cm (5' 7\")  Weight: 104.8 kg (231 lb)  SpO2: 100 %      Physical Exam   Constitutional: She is oriented to person, place, and time. She appears well-developed and well-nourished. No distress.   HENT:   Head: Normocephalic and atraumatic.   Eyes: Conjunctivae are normal. No scleral icterus.   Neck: Neck supple.   Cardiovascular: Normal rate, regular rhythm and normal heart sounds.   No murmur heard.  Pulmonary/Chest: Effort normal and breath sounds normal. No respiratory distress.   Abdominal: Soft. Bowel sounds are normal. She exhibits no mass. There is tenderness.   TTP suprapubic, left and right lower quadrants, peritoneal signs.    Musculoskeletal: Normal range of motion. She exhibits no deformity.   Lymphadenopathy:     She has no cervical adenopathy.   Neurological: She is alert and oriented to person, place, and time.   Skin: Skin is warm and dry. No rash noted. She is not diaphoretic.   Psychiatric: She has a normal mood and affect. Her behavior is normal. Judgment and thought content normal.       ED Course        Procedures               Critical Care time:  none               Results for orders placed or performed during the hospital encounter of 06/23/19 (from the past 24 hour(s))   UA reflex to Microscopic and Culture   Result Value Ref Range    Color Urine Yellow     Appearance Urine Clear     Glucose Urine " Negative NEG^Negative mg/dL    Bilirubin Urine Negative NEG^Negative    Ketones Urine Trace (A) NEG^Negative mg/dL    Specific Gravity Urine <1.005 1.000 - 1.030    Blood Urine Negative NEG^Negative    pH Urine 6.5 5.0 - 9.0 pH    Protein Albumin Urine Negative NEG^Negative mg/dL    Urobilinogen Urine 0.2 0.2 - 1.0 EU/dL    Nitrite Urine Negative NEG^Negative    Leukocyte Esterase Urine Negative NEG^Negative    Source Midstream Urine    CBC with platelets differential   Result Value Ref Range    WBC 9.7 4.0 - 11.0 10e9/L    RBC Count 4.14 3.8 - 5.2 10e12/L    Hemoglobin 11.9 11.7 - 15.7 g/dL    Hematocrit 34.6 (L) 35.0 - 47.0 %    MCV 84 78 - 100 fl    MCH 28.7 26.5 - 33.0 pg    MCHC 34.4 31.5 - 36.5 g/dL    RDW 12.9 10.0 - 15.0 %    Platelet Count 216 150 - 450 10e9/L    Diff Method Automated Method     % Neutrophils 68.0 %    % Lymphocytes 25.5 %    % Monocytes 4.6 %    % Eosinophils 1.4 %    % Basophils 0.3 %    % Immature Granulocytes 0.2 %    Absolute Neutrophil 6.6 1.6 - 8.3 10e9/L    Absolute Lymphocytes 2.5 0.8 - 5.3 10e9/L    Absolute Monocytes 0.5 0.0 - 1.3 10e9/L    Absolute Eosinophils 0.1 0.0 - 0.7 10e9/L    Absolute Basophils 0.0 0.0 - 0.2 10e9/L    Abs Immature Granulocytes 0.0 0 - 0.4 10e9/L   Comprehensive metabolic panel   Result Value Ref Range    Sodium 137 134 - 144 mmol/L    Potassium 3.7 3.5 - 5.1 mmol/L    Chloride 105 98 - 107 mmol/L    Carbon Dioxide 25 21 - 31 mmol/L    Anion Gap 7 3 - 14 mmol/L    Glucose 96 70 - 105 mg/dL    Urea Nitrogen 6 (L) 7 - 25 mg/dL    Creatinine 0.58 (L) 0.60 - 1.20 mg/dL    GFR Estimate >90 >60 mL/min/[1.73_m2]    GFR Estimate If Black >90 >60 mL/min/[1.73_m2]    Calcium 9.3 8.6 - 10.3 mg/dL    Bilirubin Total 0.3 0.3 - 1.0 mg/dL    Albumin 3.8 3.5 - 5.7 g/dL    Protein Total 6.9 6.4 - 8.9 g/dL    Alkaline Phosphatase 51 34 - 104 U/L    ALT 6 (L) 7 - 52 U/L    AST 9 (L) 13 - 39 U/L   Lipase   Result Value Ref Range    Lipase 10 (L) 11 - 82 U/L   US Appendix  Only    Narrative    Ultrasound of the right lower quadrant    HISTORY: Right lower quadrant pain    TECHNIQUE: Ultrasound of the right lower quadrant    FINDINGS: The appendix was not visualized. No right lower quadrant  masses or fluid collections are noted.      Impression    IMPRESSION: Nonvisualization of the appendix    CHRISTINA ALAMO MD   US OB < 14 Weeks Portable    Narrative    PROCEDURE: US OB <14 WEEKS SINGLE PORTABLE 6/23/2019 9:08 PM    HISTORY: reported 11 wk gestation, hx of 3 miscarriages, RLQ pain  today, peritoneal signs, no spotting, viable IUP?    COMPARISONS: June 8, 2019    TECHNIQUE: Obstetrical ultrasound    FINDINGS: There is a single intrauterine gestation. The crown-rump  length measured 50.3 mm corresponding to a gestational age of 11 weeks  6 days. Fetal cardiac activity was measured at 160 bpm. No adnexal  masses or fluid collections were seen         Impression    IMPRESSION: Live intrauterine gestation at 11 weeks 6 days    CHRISTINA ALAMO MD       Medications   acetaminophen (TYLENOL) tablet 500 mg (500 mg Oral Given 6/23/19 2020)       Assessments & Plan (with Medical Decision Making)   Patient nontoxic-appearing in no acute distress.  Heart, lung, bowel sounds normal.  Abdomen is soft but tender to palpation in the left and right lower quadrants as well as suprapubic.  When he first see the patient she clenched her fist due to the pain caused by palpation in her abdomen.  Vital signs stable, temp of 99.1.    WBC 9.7, non-concerning urinalysis, otherwise unremarkable lab work.    OB ultrasound showed Live intrauterine gestation at 11 weeks 6 days.    Ultrasound abdomen unable to visualize appendix.    Upon reassessment patient reports that she is doing better and that Tylenol has helped she still has a tender abdomen but signs of discomfort definitely declined.    I discussed the patient with Dr. Matson, surgery.  Patient was told of my concerns for possible appendicitis or other  abdominal abnormality and she was given the options of admission, serial abdominal exams and follow-up imaging tomorrow versus returning home with strict return precautions and follow-up with her OB/GYN tomorrow.  She chose the latter.  Referral was placed for the patient with OB.  She understood and agree with the plan and patient was discharged.    Kleber Gallagher PA-C    I have reviewed the nursing notes.    I have reviewed the findings, diagnosis, plan and need for follow up with the patient.          Medication List      There are no discharge medications for this visit.         Final diagnoses:   Abdominal pain in pregnancy       6/23/2019   [facility]     Kleber Gallagher PA  06/23/19 [id number]

## 2019-06-24 NOTE — DISCHARGE INSTRUCTIONS
Get plenty of fluids and rest.  Take Tylenol for discomfort.  Referral is placed for you to follow-up with your OB/GYN tomorrow for reassessment.  If you begin to develop higher fevers or intractable pain before then I recommend she return to the emergency department for further management.

## 2019-06-24 NOTE — ED TRIAGE NOTES
Pt to ER from home with c/o sharp stabbing intermittent pain to RLQ since noon today.  Pt is 11w 3d pregnant.  No cramping, spotting, discharge. No increase to nausea.

## 2019-06-27 ENCOUNTER — PRENATAL OFFICE VISIT (OUTPATIENT)
Dept: OBGYN | Facility: OTHER | Age: 24
End: 2019-06-27
Attending: OBSTETRICS & GYNECOLOGY
Payer: COMMERCIAL

## 2019-06-27 VITALS
DIASTOLIC BLOOD PRESSURE: 72 MMHG | BODY MASS INDEX: 35.71 KG/M2 | WEIGHT: 228 LBS | HEART RATE: 80 BPM | SYSTOLIC BLOOD PRESSURE: 124 MMHG

## 2019-06-27 DIAGNOSIS — Z3A.12 12 WEEKS GESTATION OF PREGNANCY: Primary | ICD-10-CM

## 2019-06-27 PROCEDURE — 99207 ZZC OB VISIT-NO CHARGE - GICH ONLY: CPT | Performed by: OBSTETRICS & GYNECOLOGY

## 2019-06-27 ASSESSMENT — PAIN SCALES - GENERAL: PAINLEVEL: NO PAIN (1)

## 2019-06-27 NOTE — NURSING NOTE
Chief Complaint   Patient presents with     Prenatal Care     12W0D       Medication Reconciliation: completed   Jenn Johnson LPN  6/27/2019 10:38 AM

## 2019-07-10 ENCOUNTER — PRENATAL OFFICE VISIT (OUTPATIENT)
Dept: OBGYN | Facility: OTHER | Age: 24
End: 2019-07-10
Attending: OBSTETRICS & GYNECOLOGY
Payer: COMMERCIAL

## 2019-07-10 VITALS
SYSTOLIC BLOOD PRESSURE: 102 MMHG | DIASTOLIC BLOOD PRESSURE: 64 MMHG | BODY MASS INDEX: 36.18 KG/M2 | WEIGHT: 231 LBS | HEART RATE: 76 BPM

## 2019-07-10 DIAGNOSIS — Z3A.13 13 WEEKS GESTATION OF PREGNANCY: Primary | ICD-10-CM

## 2019-07-10 PROCEDURE — 99207 ZZC OB VISIT-NO CHARGE - GICH ONLY: CPT | Performed by: OBSTETRICS & GYNECOLOGY

## 2019-07-10 ASSESSMENT — PAIN SCALES - GENERAL: PAINLEVEL: NO PAIN (0)

## 2019-07-10 NOTE — NURSING NOTE
Chief Complaint   Patient presents with     Prenatal Care     13w6D        Medication Reconciliation: completed   Jenn Johnson LPN  7/10/2019 10:32 AM

## 2019-07-26 ENCOUNTER — PRENATAL OFFICE VISIT (OUTPATIENT)
Dept: OBGYN | Facility: OTHER | Age: 24
End: 2019-07-26
Attending: OBSTETRICS & GYNECOLOGY
Payer: COMMERCIAL

## 2019-07-26 VITALS
WEIGHT: 230 LBS | SYSTOLIC BLOOD PRESSURE: 122 MMHG | DIASTOLIC BLOOD PRESSURE: 80 MMHG | HEART RATE: 80 BPM | BODY MASS INDEX: 36.02 KG/M2

## 2019-07-26 DIAGNOSIS — Z3A.20 20 WEEKS GESTATION OF PREGNANCY: Primary | ICD-10-CM

## 2019-07-26 PROCEDURE — 99207 ZZC OB VISIT-NO CHARGE - GICH ONLY: CPT | Performed by: OBSTETRICS & GYNECOLOGY

## 2019-07-26 ASSESSMENT — PAIN SCALES - GENERAL: PAINLEVEL: NO PAIN (0)

## 2019-07-26 NOTE — NURSING NOTE
Chief Complaint   Patient presents with     Prenatal Care     16W1D        Medication Reconciliation: completed   Jenn Johnson LPN  7/26/2019 10:48 AM

## 2019-08-09 ENCOUNTER — PRENATAL OFFICE VISIT (OUTPATIENT)
Dept: OBGYN | Facility: OTHER | Age: 24
End: 2019-08-09
Attending: OBSTETRICS & GYNECOLOGY
Payer: COMMERCIAL

## 2019-08-09 VITALS
BODY MASS INDEX: 35.96 KG/M2 | SYSTOLIC BLOOD PRESSURE: 118 MMHG | DIASTOLIC BLOOD PRESSURE: 68 MMHG | TEMPERATURE: 98.9 F | WEIGHT: 229.6 LBS | HEART RATE: 102 BPM | RESPIRATION RATE: 24 BRPM

## 2019-08-09 DIAGNOSIS — N94.9 ROUND LIGAMENT PAIN: ICD-10-CM

## 2019-08-09 DIAGNOSIS — Z3A.18 18 WEEKS GESTATION OF PREGNANCY: Primary | ICD-10-CM

## 2019-08-09 PROCEDURE — G0463 HOSPITAL OUTPT CLINIC VISIT: HCPCS

## 2019-08-09 PROCEDURE — 99207 ZZC OB VISIT-NO CHARGE - GICH ONLY: CPT | Performed by: OBSTETRICS & GYNECOLOGY

## 2019-08-09 ASSESSMENT — PAIN SCALES - GENERAL: PAINLEVEL: MODERATE PAIN (5)

## 2019-08-09 NOTE — NURSING NOTE
Patient is here for ob concerns, is 18w1d. States since Wednesday has been having pelvic/low abdomen pains, pain when urinating at ovary areas, and when laying on one side will hurt for a while on that side after she moves or rolls to other side. Denies any bleeding or spotting.  Mary Alice Weinberg LPN .............8/9/2019     11:33 AM      Patient's last menstrual period was 04/04/2019 (exact date).  Medication Reconciliation: complete    Mary Alice Weinberg LPN  8/9/2019 11:38 AM

## 2019-08-09 NOTE — PROGRESS NOTES
Seen as add-on  Bilateral lower quadrant pulling and pain  No ctx, bleeding etc.  Exam consistent with ligament pain  Reassured  US in 2 weeks

## 2019-08-12 ENCOUNTER — PRENATAL OFFICE VISIT (OUTPATIENT)
Dept: OBGYN | Facility: OTHER | Age: 24
End: 2019-08-12
Attending: OBSTETRICS & GYNECOLOGY
Payer: COMMERCIAL

## 2019-08-12 ENCOUNTER — MYC MEDICAL ADVICE (OUTPATIENT)
Dept: OBGYN | Facility: OTHER | Age: 24
End: 2019-08-12

## 2019-08-12 VITALS
SYSTOLIC BLOOD PRESSURE: 112 MMHG | DIASTOLIC BLOOD PRESSURE: 72 MMHG | TEMPERATURE: 98.8 F | BODY MASS INDEX: 36.56 KG/M2 | WEIGHT: 233.4 LBS

## 2019-08-12 DIAGNOSIS — Z91.89 RISK OF EXPOSURE TO LYME DISEASE: Primary | ICD-10-CM

## 2019-08-12 PROCEDURE — G0463 HOSPITAL OUTPT CLINIC VISIT: HCPCS | Performed by: OBSTETRICS & GYNECOLOGY

## 2019-08-12 PROCEDURE — 99212 OFFICE O/P EST SF 10 MIN: CPT | Performed by: OBSTETRICS & GYNECOLOGY

## 2019-08-12 RX ORDER — DOXYCYCLINE 100 MG/1
100 CAPSULE ORAL ONCE
Qty: 2 CAPSULE | Refills: 0 | Status: SHIPPED | OUTPATIENT
Start: 2019-08-12 | End: 2019-08-22

## 2019-08-12 ASSESSMENT — PAIN SCALES - GENERAL: PAINLEVEL: NO PAIN (0)

## 2019-08-12 NOTE — PROGRESS NOTES
See as add-n  Patient removed an embedded Deer tick behind her left knee - surrounding mild erythema (10 mm size) without migration  No systemic sx  Allergic to azithromycin  Given prophilactic dose of doxycycline 200 mg x one dose

## 2019-08-12 NOTE — TELEPHONE ENCOUNTER
Called and spoke to patient.  She reported having a deer tick attached, starting to burrow.  Time attached is unknown.  Red bullseye around the site.  Scheduled patient to see DARIANA today.   Daniella Owens RN on 8/12/2019 at 11:11 AM

## 2019-08-22 ENCOUNTER — HOSPITAL ENCOUNTER (OUTPATIENT)
Dept: ULTRASOUND IMAGING | Facility: OTHER | Age: 24
Discharge: HOME | End: 2019-08-22
Attending: OBSTETRICS & GYNECOLOGY | Admitting: OBSTETRICS & GYNECOLOGY
Payer: COMMERCIAL

## 2019-08-22 ENCOUNTER — PRENATAL OFFICE VISIT (OUTPATIENT)
Dept: OBGYN | Facility: OTHER | Age: 24
End: 2019-08-22
Attending: OBSTETRICS & GYNECOLOGY
Payer: COMMERCIAL

## 2019-08-22 VITALS
DIASTOLIC BLOOD PRESSURE: 66 MMHG | WEIGHT: 233.8 LBS | BODY MASS INDEX: 36.62 KG/M2 | HEART RATE: 72 BPM | SYSTOLIC BLOOD PRESSURE: 128 MMHG | RESPIRATION RATE: 20 BRPM

## 2019-08-22 DIAGNOSIS — Z3A.20 20 WEEKS GESTATION OF PREGNANCY: ICD-10-CM

## 2019-08-22 DIAGNOSIS — Z3A.20 20 WEEKS GESTATION OF PREGNANCY: Primary | ICD-10-CM

## 2019-08-22 DIAGNOSIS — O44.20 MARGINAL PLACENTA PREVIA: ICD-10-CM

## 2019-08-22 PROCEDURE — 99207 ZZC OB VISIT-NO CHARGE - GICH ONLY: CPT | Performed by: OBSTETRICS & GYNECOLOGY

## 2019-08-22 PROCEDURE — 76805 OB US >/= 14 WKS SNGL FETUS: CPT

## 2019-08-22 ASSESSMENT — PAIN SCALES - GENERAL: PAINLEVEL: NO PAIN (0)

## 2019-08-22 NOTE — PROGRESS NOTES
US:  Placenta low posterior (< 1 cm away from os)  No abnormalities seen.  Cardiac exam less than optimal  Normal fluid    Plan:  Continue ASA  BS screen next visit  Repeat US at 28 week visit

## 2019-08-22 NOTE — LETTER
August 22, 2019      Alize Clifton  [address] [address] [address]        To Whom It May Concern:    Alize Clifton  was seen on 8/22/19.  Her , Mani, accompanied her to this appointment. Please excuse him from work for this visit.      Sincerely,        Cody Jenkins MD

## 2019-08-22 NOTE — NURSING NOTE
"Chief Complaint   Patient presents with     Prenatal Care     20w 0d       Initial /66 (BP Location: Right arm, Patient Position: Sitting, Cuff Size: Adult Regular)   Pulse 72   Resp 20   Wt 106.1 kg (233 lb 12.8 oz)   LMP 04/04/2019 (Exact Date)   Breastfeeding? No   BMI 36.62 kg/m   Estimated body mass index is 36.62 kg/m  as calculated from the following:    Height as of 6/23/19: 1.702 m (5' 7\").    Weight as of this encounter: 106.1 kg (233 lb 12.8 oz).  Medication Reconciliation: Completed     Chinyere Michel LPN  "

## 2019-09-19 ENCOUNTER — PRENATAL OFFICE VISIT (OUTPATIENT)
Dept: OBGYN | Facility: OTHER | Age: 24
End: 2019-09-19
Attending: OBSTETRICS & GYNECOLOGY
Payer: COMMERCIAL

## 2019-09-19 VITALS
SYSTOLIC BLOOD PRESSURE: 126 MMHG | BODY MASS INDEX: 37.43 KG/M2 | DIASTOLIC BLOOD PRESSURE: 70 MMHG | HEART RATE: 88 BPM | WEIGHT: 239 LBS

## 2019-09-19 DIAGNOSIS — O44.42 LOW-LYING PLACENTA IN SECOND TRIMESTER: ICD-10-CM

## 2019-09-19 DIAGNOSIS — Z3A.24 24 WEEKS GESTATION OF PREGNANCY: Primary | ICD-10-CM

## 2019-09-19 LAB
GLUCOSE 1H P 50 G GLC PO SERPL-MCNC: 120 MG/DL (ref 60–129)
HGB BLD-MCNC: 11.3 G/DL (ref 11.7–15.7)

## 2019-09-19 PROCEDURE — 36415 COLL VENOUS BLD VENIPUNCTURE: CPT | Mod: ZL | Performed by: OBSTETRICS & GYNECOLOGY

## 2019-09-19 PROCEDURE — 82950 GLUCOSE TEST: CPT | Mod: ZL | Performed by: OBSTETRICS & GYNECOLOGY

## 2019-09-19 PROCEDURE — 85018 HEMOGLOBIN: CPT | Mod: ZL | Performed by: OBSTETRICS & GYNECOLOGY

## 2019-09-19 PROCEDURE — 99207 ZZC OB VISIT-NO CHARGE - GICH ONLY: CPT | Performed by: OBSTETRICS & GYNECOLOGY

## 2019-09-19 PROCEDURE — 86780 TREPONEMA PALLIDUM: CPT | Mod: ZL | Performed by: OBSTETRICS & GYNECOLOGY

## 2019-09-19 ASSESSMENT — PAIN SCALES - GENERAL: PAINLEVEL: NO PAIN (0)

## 2019-09-19 NOTE — NURSING NOTE
Chief Complaint   Patient presents with     Prenatal Care     24W0D       Medication Reconciliation: completed   Jenn Johnson LPN  9/19/2019 10:07 AM

## 2019-09-20 LAB — T PALLIDUM AB SER QL: NONREACTIVE

## 2019-10-17 ENCOUNTER — PRENATAL OFFICE VISIT (OUTPATIENT)
Dept: OBGYN | Facility: OTHER | Age: 24
End: 2019-10-17
Attending: OBSTETRICS & GYNECOLOGY
Payer: COMMERCIAL

## 2019-10-17 ENCOUNTER — HOSPITAL ENCOUNTER (OUTPATIENT)
Dept: ULTRASOUND IMAGING | Facility: OTHER | Age: 24
Discharge: HOME | End: 2019-10-17
Attending: OBSTETRICS & GYNECOLOGY | Admitting: OBSTETRICS & GYNECOLOGY
Payer: COMMERCIAL

## 2019-10-17 VITALS
SYSTOLIC BLOOD PRESSURE: 122 MMHG | HEART RATE: 80 BPM | WEIGHT: 248 LBS | DIASTOLIC BLOOD PRESSURE: 60 MMHG | BODY MASS INDEX: 38.84 KG/M2

## 2019-10-17 DIAGNOSIS — Z3A.28 28 WEEKS GESTATION OF PREGNANCY: Primary | ICD-10-CM

## 2019-10-17 DIAGNOSIS — Z23 NEED FOR PROPHYLACTIC VACCINATION AND INOCULATION AGAINST INFLUENZA: ICD-10-CM

## 2019-10-17 DIAGNOSIS — O44.42 LOW-LYING PLACENTA IN SECOND TRIMESTER: ICD-10-CM

## 2019-10-17 PROCEDURE — 90472 IMMUNIZATION ADMIN EACH ADD: CPT

## 2019-10-17 PROCEDURE — 99207 ZZC OB VISIT-NO CHARGE - GICH ONLY: CPT | Performed by: OBSTETRICS & GYNECOLOGY

## 2019-10-17 PROCEDURE — 90715 TDAP VACCINE 7 YRS/> IM: CPT | Performed by: OBSTETRICS & GYNECOLOGY

## 2019-10-17 PROCEDURE — 90471 IMMUNIZATION ADMIN: CPT

## 2019-10-17 PROCEDURE — 90686 IIV4 VACC NO PRSV 0.5 ML IM: CPT | Performed by: OBSTETRICS & GYNECOLOGY

## 2019-10-17 PROCEDURE — 76816 OB US FOLLOW-UP PER FETUS: CPT

## 2019-10-17 ASSESSMENT — PAIN SCALES - GENERAL: PAINLEVEL: NO PAIN (0)

## 2019-10-17 NOTE — NURSING NOTE
Chief Complaint   Patient presents with     Prenatal Care     28W0D       Medication Reconciliation: completed   Jenn Johnson LPN  10/17/2019 11:29 AM

## 2019-10-17 NOTE — PROGRESS NOTES
US report pending  Growth at 71%, breech, placenta posterior 2.4 cm from os  Boostrix and flu shot today  Remains on ASA

## 2019-10-17 NOTE — PROGRESS NOTES
Immunization Documentation    Prior to Immunization administration, verified patients identity using patient's name and date of birth. Please see IMMUNIZATIONS  and order for additional information.  Patient / Parent instructed to remain in clinic for 15 minutes and report any adverse reaction to staff immediately.    Was entire vial of medication used? Yes  Vial/Syringe: Single dose vial    Daniella Owens RN  10/17/2019   12:06 PM

## 2019-11-03 ENCOUNTER — OFFICE VISIT (OUTPATIENT)
Dept: FAMILY MEDICINE | Facility: OTHER | Age: 24
End: 2019-11-03
Attending: NURSE PRACTITIONER
Payer: COMMERCIAL

## 2019-11-03 VITALS
HEART RATE: 102 BPM | SYSTOLIC BLOOD PRESSURE: 120 MMHG | TEMPERATURE: 99.8 F | DIASTOLIC BLOOD PRESSURE: 72 MMHG | RESPIRATION RATE: 20 BRPM | WEIGHT: 251.8 LBS | BODY MASS INDEX: 39.52 KG/M2 | OXYGEN SATURATION: 98 % | HEIGHT: 67 IN

## 2019-11-03 DIAGNOSIS — Z71.1 CONCERN ABOUT PREGNANCY COMPLICATION WITHOUT DIAGNOSIS: Primary | ICD-10-CM

## 2019-11-03 PROCEDURE — 99213 OFFICE O/P EST LOW 20 MIN: CPT | Performed by: NURSE PRACTITIONER

## 2019-11-03 PROCEDURE — G0463 HOSPITAL OUTPT CLINIC VISIT: HCPCS

## 2019-11-03 ASSESSMENT — MIFFLIN-ST. JEOR: SCORE: 1934.79

## 2019-11-03 NOTE — NURSING NOTE
Patient presents to the clinic for lab request for toxoplasmosis. Patient states her new kitten is really sick and she noticed worms in the litter box today. She reports no symptoms.  Medication Reconciliation: complete    Hiral Blake, CMA

## 2019-11-03 NOTE — PROGRESS NOTES
"Subjective     Alize Fountain is a 24 year old female who presents to clinic today for the following health issues:    HPI   Toxoplasmosis concerns  Recently got a kitten, is currently 30+ weeks pregnant. Notes the kitten has been \"sickly\", not gaining weight well. States sister was cleaning out litter box today and noted worms in the feces. This has Alize worried significantly about her baby boy and risk for toxoplasmosis. She states the litter box is in the laundry room and the cat \"walks all over everything after using the litter box\". States she has had some mild low back pain off and on throughout the pregnancy, some round ligament pain off and on and that the baby's kicks are hurting more now than they used to. No other symptoms at this time. Is anxious as she has had 3 miscarriages in the past, all around 6 weeks.     Patient Active Problem List   Diagnosis     Abdominal pain, generalized     Allergic state     Contraceptive management     HPV vaccine counseling     SAB (spontaneous )     Supervision of normal pregnancy in first trimester     TMJ pain dysfunction syndrome     Past Surgical History:   Procedure Laterality Date     DILATION AND CURETTAGE SUCTION N/A 2019    Procedure: DILATION AND CURETTAGE SUCTION;  Surgeon: Cody Jenkins MD;  Location:  OR       Social History     Tobacco Use     Smoking status: Former Smoker     Packs/day: 0.10     Types: Cigarettes     Last attempt to quit: 2019     Years since quittin.4     Smokeless tobacco: Never Used   Substance Use Topics     Alcohol use: No     History reviewed. No pertinent family history.      Current Outpatient Medications   Medication Sig Dispense Refill     aspirin 81 MG EC tablet Take 81 mg by mouth daily       Prenatal MV-Min-Fe Fum-FA-DHA (PRENATAL 1 PO)        Allergies   Allergen Reactions     Azithromycin Rash     Gin [Alcohol] Swelling     Eye swelling and itching        Reviewed and updated as needed this " "visit by Provider  Tobacco  Allergies  Meds  Problems  Med Hx  Surg Hx  Fam Hx  Soc Hx          Review of Systems   ROS COMP: As above      Objective    /72 (BP Location: Right arm)   Pulse 102   Temp 99.8  F (37.7  C) (Tympanic)   Resp 20   Ht 1.702 m (5' 7\")   Wt 114.2 kg (251 lb 12.8 oz)   LMP 04/04/2019 (Exact Date)   SpO2 98%   Breastfeeding? No   BMI 39.44 kg/m    Body mass index is 39.44 kg/m .  Physical Exam   GENERAL: healthy, alert and no distress  ABDOMEN: soft, nontender, without hepatosplenomegaly or masses, bowel sounds normal and gravid  PSYCH: mentation appears normal, tearful, anxious and appearance well groomed    Diagnostic Test Results:  Labs reviewed in Epic  none         Assessment & Plan     1. Concern about pregnancy complication without diagnosis  Here for concerns over possible toxoplasmosis contact from her own \"sickly kitten\". Sister was cleaning the litter box today and noted worms in the feces, which has caused Alize to become significantly anxious about possible pregnancy complications. Has no symptoms and has not been cleaning the litter box at all. Does report having picked up 2 small stools outside the litter box with paper towel a few months ago and immediately washing her hands well with soap and water. Discussed possibility of labs today, though this is not indicated based on report by patient. Discussed calling the OB clinic tomorrow and discussing with her OB for appropriateness of lab without symptoms or recent fecal exposure, is very anxious about this as she has had 3 miscarriages in the past and is fearful of losing this baby boy as well.        Shira Hill NP  [facility]        "

## 2019-11-07 ENCOUNTER — PRENATAL OFFICE VISIT (OUTPATIENT)
Dept: OBGYN | Facility: OTHER | Age: 24
End: 2019-11-07
Attending: OBSTETRICS & GYNECOLOGY
Payer: COMMERCIAL

## 2019-11-07 VITALS
SYSTOLIC BLOOD PRESSURE: 122 MMHG | RESPIRATION RATE: 20 BRPM | WEIGHT: 251 LBS | HEART RATE: 80 BPM | BODY MASS INDEX: 39.31 KG/M2 | DIASTOLIC BLOOD PRESSURE: 68 MMHG

## 2019-11-07 DIAGNOSIS — O26.23 PREGNANCY COMPLICATED BY PREVIOUS RECURRENT MISCARRIAGES, THIRD TRIMESTER: ICD-10-CM

## 2019-11-07 DIAGNOSIS — Z3A.31 31 WEEKS GESTATION OF PREGNANCY: Primary | ICD-10-CM

## 2019-11-07 PROCEDURE — 99207 ZZC OB VISIT-NO CHARGE - GICH ONLY: CPT | Performed by: OBSTETRICS & GYNECOLOGY

## 2019-11-07 ASSESSMENT — PAIN SCALES - GENERAL: PAINLEVEL: NO PAIN (0)

## 2019-11-07 NOTE — NURSING NOTE
"Chief Complaint   Patient presents with     Prenatal Care     31w 0d        Initial /68 (BP Location: Right arm, Patient Position: Sitting, Cuff Size: Adult Regular)   Pulse 80   Resp 20   Wt 113.9 kg (251 lb)   LMP 04/04/2019 (Exact Date)   Breastfeeding? No   BMI 39.31 kg/m   Estimated body mass index is 39.31 kg/m  as calculated from the following:    Height as of 11/3/19: 1.702 m (5' 7\").    Weight as of this encounter: 113.9 kg (251 lb).  Medication Reconciliation: Completed     Chinyere Michel LPN  "

## 2019-11-27 ENCOUNTER — PRENATAL OFFICE VISIT (OUTPATIENT)
Dept: OBGYN | Facility: OTHER | Age: 24
End: 2019-11-27
Attending: OBSTETRICS & GYNECOLOGY
Payer: COMMERCIAL

## 2019-11-27 VITALS
TEMPERATURE: 98.7 F | HEART RATE: 80 BPM | WEIGHT: 254 LBS | OXYGEN SATURATION: 98 % | RESPIRATION RATE: 20 BRPM | SYSTOLIC BLOOD PRESSURE: 120 MMHG | DIASTOLIC BLOOD PRESSURE: 62 MMHG | BODY MASS INDEX: 39.78 KG/M2

## 2019-11-27 DIAGNOSIS — Z3A.33 33 WEEKS GESTATION OF PREGNANCY: Primary | ICD-10-CM

## 2019-11-27 PROCEDURE — 99207 ZZC OB VISIT-NO CHARGE - GICH ONLY: CPT | Performed by: OBSTETRICS & GYNECOLOGY

## 2019-11-27 PROCEDURE — G0463 HOSPITAL OUTPT CLINIC VISIT: HCPCS

## 2019-11-27 ASSESSMENT — PAIN SCALES - GENERAL: PAINLEVEL: NO PAIN (0)

## 2019-11-27 NOTE — NURSING NOTE
Patient is here for ob check 33w6d. 2 Babystep coupons given.   Mary Alice Weinberg LPN .............11/27/2019     11:36 AM      Patient's last menstrual period was 04/04/2019 (exact date).  Medication Reconciliation: complete    Mary Alice Weinberg LPN  11/27/2019 11:40 AM

## 2019-12-12 ENCOUNTER — PRENATAL OFFICE VISIT (OUTPATIENT)
Dept: OBGYN | Facility: OTHER | Age: 24
End: 2019-12-12
Attending: OBSTETRICS & GYNECOLOGY
Payer: COMMERCIAL

## 2019-12-12 VITALS
HEART RATE: 96 BPM | SYSTOLIC BLOOD PRESSURE: 116 MMHG | BODY MASS INDEX: 40.5 KG/M2 | WEIGHT: 258.6 LBS | RESPIRATION RATE: 20 BRPM | DIASTOLIC BLOOD PRESSURE: 62 MMHG

## 2019-12-12 DIAGNOSIS — Z3A.36 36 WEEKS GESTATION OF PREGNANCY: Primary | ICD-10-CM

## 2019-12-12 PROCEDURE — 99207 ZZC OB VISIT-NO CHARGE - GICH ONLY: CPT | Performed by: OBSTETRICS & GYNECOLOGY

## 2019-12-12 PROCEDURE — 87081 CULTURE SCREEN ONLY: CPT | Mod: ZL | Performed by: OBSTETRICS & GYNECOLOGY

## 2019-12-12 ASSESSMENT — PAIN SCALES - GENERAL: PAINLEVEL: NO PAIN (0)

## 2019-12-12 NOTE — NURSING NOTE
"Chief Complaint   Patient presents with     Prenatal Care     36       Initial /62 (BP Location: Right arm, Patient Position: Sitting, Cuff Size: Adult Large)   Pulse 96   Resp 20   Wt 117.3 kg (258 lb 9.6 oz)   LMP 04/04/2019 (Exact Date)   Breastfeeding No   BMI 40.50 kg/m   Estimated body mass index is 40.5 kg/m  as calculated from the following:    Height as of 11/3/19: 1.702 m (5' 7\").    Weight as of this encounter: 117.3 kg (258 lb 9.6 oz).  Medication Reconciliation: complete    Juju Tiwari LPN  "

## 2019-12-14 LAB
BACTERIA SPEC CULT: NORMAL
SPECIMEN SOURCE: NORMAL

## 2019-12-19 ENCOUNTER — PRENATAL OFFICE VISIT (OUTPATIENT)
Dept: OBGYN | Facility: OTHER | Age: 24
End: 2019-12-19
Attending: OBSTETRICS & GYNECOLOGY
Payer: COMMERCIAL

## 2019-12-19 VITALS
RESPIRATION RATE: 20 BRPM | HEART RATE: 84 BPM | DIASTOLIC BLOOD PRESSURE: 70 MMHG | WEIGHT: 263.5 LBS | BODY MASS INDEX: 41.27 KG/M2 | SYSTOLIC BLOOD PRESSURE: 106 MMHG

## 2019-12-19 DIAGNOSIS — Z3A.37 37 WEEKS GESTATION OF PREGNANCY: Primary | ICD-10-CM

## 2019-12-19 PROCEDURE — G0463 HOSPITAL OUTPT CLINIC VISIT: HCPCS

## 2019-12-19 PROCEDURE — 99207 ZZC OB VISIT-NO CHARGE - GICH ONLY: CPT | Performed by: OBSTETRICS & GYNECOLOGY

## 2019-12-19 ASSESSMENT — PAIN SCALES - GENERAL: PAINLEVEL: NO PAIN (0)

## 2019-12-27 ENCOUNTER — PRENATAL OFFICE VISIT (OUTPATIENT)
Dept: OBGYN | Facility: OTHER | Age: 24
End: 2019-12-27
Attending: OBSTETRICS & GYNECOLOGY
Payer: COMMERCIAL

## 2019-12-27 VITALS
HEART RATE: 76 BPM | DIASTOLIC BLOOD PRESSURE: 60 MMHG | SYSTOLIC BLOOD PRESSURE: 120 MMHG | BODY MASS INDEX: 41.19 KG/M2 | WEIGHT: 263 LBS

## 2019-12-27 DIAGNOSIS — Z3A.38 38 WEEKS GESTATION OF PREGNANCY: Primary | ICD-10-CM

## 2019-12-27 PROCEDURE — 99207 ZZC OB VISIT-NO CHARGE - GICH ONLY: CPT | Performed by: OBSTETRICS & GYNECOLOGY

## 2019-12-27 ASSESSMENT — PAIN SCALES - GENERAL: PAINLEVEL: NO PAIN (0)

## 2019-12-27 NOTE — NURSING NOTE
Chief Complaint   Patient presents with     Prenatal Care     38W1D          Medication Reconciliation: completed   Jenn Johnson LPN  12/27/2019 8:43 AM

## 2020-01-02 ENCOUNTER — PRENATAL OFFICE VISIT (OUTPATIENT)
Dept: OBGYN | Facility: OTHER | Age: 25
End: 2020-01-02
Attending: OBSTETRICS & GYNECOLOGY
Payer: COMMERCIAL

## 2020-01-02 VITALS
RESPIRATION RATE: 20 BRPM | SYSTOLIC BLOOD PRESSURE: 120 MMHG | BODY MASS INDEX: 41.3 KG/M2 | WEIGHT: 263.7 LBS | DIASTOLIC BLOOD PRESSURE: 70 MMHG | HEART RATE: 81 BPM

## 2020-01-02 DIAGNOSIS — Z3A.39 39 WEEKS GESTATION OF PREGNANCY: Primary | ICD-10-CM

## 2020-01-02 PROCEDURE — 99207 ZZC OB VISIT-NO CHARGE - GICH ONLY: CPT | Performed by: OBSTETRICS & GYNECOLOGY

## 2020-01-02 ASSESSMENT — PAIN SCALES - GENERAL: PAINLEVEL: NO PAIN (0)

## 2020-01-02 NOTE — PROGRESS NOTES
Doing well  Infant very active  Infrequent contractions  Discussed options for induction - plan in one week at 40 weeks

## 2020-01-08 RX ORDER — LIDOCAINE 40 MG/G
CREAM TOPICAL
Status: CANCELLED | OUTPATIENT
Start: 2020-01-08

## 2020-01-08 RX ORDER — NALOXONE HYDROCHLORIDE 0.4 MG/ML
.1-.4 INJECTION, SOLUTION INTRAMUSCULAR; INTRAVENOUS; SUBCUTANEOUS
Status: CANCELLED | OUTPATIENT
Start: 2020-01-08

## 2020-01-08 RX ORDER — METHYLERGONOVINE MALEATE 0.2 MG/ML
200 INJECTION INTRAVENOUS
Status: CANCELLED | OUTPATIENT
Start: 2020-01-08

## 2020-01-08 RX ORDER — OXYCODONE AND ACETAMINOPHEN 5; 325 MG/1; MG/1
1 TABLET ORAL
Status: CANCELLED | OUTPATIENT
Start: 2020-01-08

## 2020-01-08 RX ORDER — ACETAMINOPHEN 325 MG/1
650 TABLET ORAL EVERY 4 HOURS PRN
Status: CANCELLED | OUTPATIENT
Start: 2020-01-08

## 2020-01-08 RX ORDER — ONDANSETRON 2 MG/ML
4 INJECTION INTRAMUSCULAR; INTRAVENOUS EVERY 6 HOURS PRN
Status: CANCELLED | OUTPATIENT
Start: 2020-01-08

## 2020-01-08 RX ORDER — CARBOPROST TROMETHAMINE 250 UG/ML
250 INJECTION, SOLUTION INTRAMUSCULAR
Status: CANCELLED | OUTPATIENT
Start: 2020-01-08

## 2020-01-08 RX ORDER — OXYTOCIN 10 [USP'U]/ML
10 INJECTION, SOLUTION INTRAMUSCULAR; INTRAVENOUS
Status: CANCELLED | OUTPATIENT
Start: 2020-01-08

## 2020-01-08 RX ORDER — SODIUM CHLORIDE, SODIUM LACTATE, POTASSIUM CHLORIDE, CALCIUM CHLORIDE 600; 310; 30; 20 MG/100ML; MG/100ML; MG/100ML; MG/100ML
INJECTION, SOLUTION INTRAVENOUS CONTINUOUS
Status: CANCELLED | OUTPATIENT
Start: 2020-01-08

## 2020-01-08 RX ORDER — IBUPROFEN 200 MG
800 TABLET ORAL
Status: CANCELLED | OUTPATIENT
Start: 2020-01-08

## 2020-01-09 ENCOUNTER — ANESTHESIA EVENT (OUTPATIENT)
Dept: OBGYN | Facility: OTHER | Age: 25
End: 2020-01-09
Payer: COMMERCIAL

## 2020-01-09 ENCOUNTER — ANESTHESIA (OUTPATIENT)
Dept: OBGYN | Facility: OTHER | Age: 25
End: 2020-01-09
Payer: COMMERCIAL

## 2020-01-09 ENCOUNTER — HOSPITAL ENCOUNTER (INPATIENT)
Facility: OTHER | Age: 25
LOS: 2 days | Discharge: HOME | End: 2020-01-11
Attending: OBSTETRICS & GYNECOLOGY | Admitting: OBSTETRICS & GYNECOLOGY
Payer: COMMERCIAL

## 2020-01-09 PROBLEM — Z3A.40 40 WEEKS GESTATION OF PREGNANCY: Status: ACTIVE | Noted: 2020-01-09

## 2020-01-09 LAB
ABO + RH BLD: NORMAL
ABO + RH BLD: NORMAL
BLD GP AB SCN SERPL QL: NORMAL
ERYTHROCYTE [DISTWIDTH] IN BLOOD BY AUTOMATED COUNT: 14.3 % (ref 10–15)
HCT VFR BLD AUTO: 34.4 % (ref 35–47)
HGB BLD-MCNC: 11.3 G/DL (ref 11.7–15.7)
MCH RBC QN AUTO: 27.5 PG (ref 26.5–33)
MCHC RBC AUTO-ENTMCNC: 32.8 G/DL (ref 31.5–36.5)
MCV RBC AUTO: 84 FL (ref 78–100)
PLATELET # BLD AUTO: 239 10E9/L (ref 150–450)
RBC # BLD AUTO: 4.11 10E12/L (ref 3.8–5.2)
SPECIMEN EXP DATE BLD: NORMAL
WBC # BLD AUTO: 10.8 10E9/L (ref 4–11)

## 2020-01-09 PROCEDURE — 86850 RBC ANTIBODY SCREEN: CPT | Performed by: OBSTETRICS & GYNECOLOGY

## 2020-01-09 PROCEDURE — 86901 BLOOD TYPING SEROLOGIC RH(D): CPT | Performed by: OBSTETRICS & GYNECOLOGY

## 2020-01-09 PROCEDURE — 25000128 H RX IP 250 OP 636: Performed by: NURSE ANESTHETIST, CERTIFIED REGISTERED

## 2020-01-09 PROCEDURE — 85027 COMPLETE CBC AUTOMATED: CPT | Performed by: OBSTETRICS & GYNECOLOGY

## 2020-01-09 PROCEDURE — 12000000 ZZH R&B MED SURG/OB

## 2020-01-09 PROCEDURE — 10907ZC DRAINAGE OF AMNIOTIC FLUID, THERAPEUTIC FROM PRODUCTS OF CONCEPTION, VIA NATURAL OR ARTIFICIAL OPENING: ICD-10-PCS | Performed by: OBSTETRICS & GYNECOLOGY

## 2020-01-09 PROCEDURE — 10H07YZ INSERTION OF OTHER DEVICE INTO PRODUCTS OF CONCEPTION, VIA NATURAL OR ARTIFICIAL OPENING: ICD-10-PCS | Performed by: OBSTETRICS & GYNECOLOGY

## 2020-01-09 PROCEDURE — 72200001 ZZH LABOR CARE VAGINAL DELIVERY SINGLE

## 2020-01-09 PROCEDURE — 86780 TREPONEMA PALLIDUM: CPT | Performed by: OBSTETRICS & GYNECOLOGY

## 2020-01-09 PROCEDURE — 25800030 ZZH RX IP 258 OP 636: Performed by: OBSTETRICS & GYNECOLOGY

## 2020-01-09 PROCEDURE — 25000132 ZZH RX MED GY IP 250 OP 250 PS 637: Performed by: OBSTETRICS & GYNECOLOGY

## 2020-01-09 PROCEDURE — 25800030 ZZH RX IP 258 OP 636: Performed by: NURSE ANESTHETIST, CERTIFIED REGISTERED

## 2020-01-09 PROCEDURE — 25000125 ZZHC RX 250: Performed by: NURSE ANESTHETIST, CERTIFIED REGISTERED

## 2020-01-09 PROCEDURE — 37000011 ZZH ANESTHESIA WARD SERVICE

## 2020-01-09 PROCEDURE — 86900 BLOOD TYPING SEROLOGIC ABO: CPT | Performed by: OBSTETRICS & GYNECOLOGY

## 2020-01-09 PROCEDURE — 0KQM0ZZ REPAIR PERINEUM MUSCLE, OPEN APPROACH: ICD-10-PCS | Performed by: OBSTETRICS & GYNECOLOGY

## 2020-01-09 PROCEDURE — 3E033VJ INTRODUCTION OF OTHER HORMONE INTO PERIPHERAL VEIN, PERCUTANEOUS APPROACH: ICD-10-PCS | Performed by: OBSTETRICS & GYNECOLOGY

## 2020-01-09 PROCEDURE — 59400 OBSTETRICAL CARE: CPT | Performed by: OBSTETRICS & GYNECOLOGY

## 2020-01-09 PROCEDURE — 36415 COLL VENOUS BLD VENIPUNCTURE: CPT | Performed by: OBSTETRICS & GYNECOLOGY

## 2020-01-09 PROCEDURE — 25000125 ZZHC RX 250: Performed by: OBSTETRICS & GYNECOLOGY

## 2020-01-09 RX ORDER — OXYTOCIN 10 [USP'U]/ML
10 INJECTION, SOLUTION INTRAMUSCULAR; INTRAVENOUS
Status: DISCONTINUED | OUTPATIENT
Start: 2020-01-09 | End: 2020-01-11 | Stop reason: HOSPADM

## 2020-01-09 RX ORDER — IBUPROFEN 400 MG/1
800 TABLET, FILM COATED ORAL
Status: DISCONTINUED | OUTPATIENT
Start: 2020-01-09 | End: 2020-01-11 | Stop reason: HOSPADM

## 2020-01-09 RX ORDER — AMOXICILLIN 250 MG
2 CAPSULE ORAL 2 TIMES DAILY
Status: DISCONTINUED | OUTPATIENT
Start: 2020-01-09 | End: 2020-01-11 | Stop reason: HOSPADM

## 2020-01-09 RX ORDER — HYDROCORTISONE 2.5 %
CREAM (GRAM) TOPICAL 3 TIMES DAILY PRN
Status: DISCONTINUED | OUTPATIENT
Start: 2020-01-09 | End: 2020-01-11 | Stop reason: HOSPADM

## 2020-01-09 RX ORDER — NALOXONE HYDROCHLORIDE 0.4 MG/ML
.1-.4 INJECTION, SOLUTION INTRAMUSCULAR; INTRAVENOUS; SUBCUTANEOUS
Status: DISCONTINUED | OUTPATIENT
Start: 2020-01-09 | End: 2020-01-11 | Stop reason: HOSPADM

## 2020-01-09 RX ORDER — ONDANSETRON 2 MG/ML
4 INJECTION INTRAMUSCULAR; INTRAVENOUS EVERY 6 HOURS PRN
Status: DISCONTINUED | OUTPATIENT
Start: 2020-01-09 | End: 2020-01-11 | Stop reason: HOSPADM

## 2020-01-09 RX ORDER — OXYCODONE AND ACETAMINOPHEN 5; 325 MG/1; MG/1
1 TABLET ORAL
Status: DISCONTINUED | OUTPATIENT
Start: 2020-01-09 | End: 2020-01-11 | Stop reason: HOSPADM

## 2020-01-09 RX ORDER — AMOXICILLIN 250 MG
1 CAPSULE ORAL 2 TIMES DAILY
Status: DISCONTINUED | OUTPATIENT
Start: 2020-01-09 | End: 2020-01-11 | Stop reason: HOSPADM

## 2020-01-09 RX ORDER — NALBUPHINE HYDROCHLORIDE 10 MG/ML
2.5-5 INJECTION, SOLUTION INTRAMUSCULAR; INTRAVENOUS; SUBCUTANEOUS EVERY 6 HOURS PRN
Status: DISCONTINUED | OUTPATIENT
Start: 2020-01-09 | End: 2020-01-11 | Stop reason: HOSPADM

## 2020-01-09 RX ORDER — FENTANYL/BUPIVACAINE/NS/PF 2-1250MCG
PLASTIC BAG, INJECTION (ML) INJECTION CONTINUOUS PRN
Status: DISCONTINUED | OUTPATIENT
Start: 2020-01-09 | End: 2020-01-09

## 2020-01-09 RX ORDER — BISACODYL 10 MG
10 SUPPOSITORY, RECTAL RECTAL DAILY PRN
Status: DISCONTINUED | OUTPATIENT
Start: 2020-01-11 | End: 2020-01-11 | Stop reason: HOSPADM

## 2020-01-09 RX ORDER — CEFAZOLIN SODIUM 2 G/100ML
2 INJECTION, SOLUTION INTRAVENOUS
Status: DISCONTINUED | OUTPATIENT
Start: 2020-01-09 | End: 2020-01-11 | Stop reason: HOSPADM

## 2020-01-09 RX ORDER — CARBOPROST TROMETHAMINE 250 UG/ML
250 INJECTION, SOLUTION INTRAMUSCULAR
Status: DISCONTINUED | OUTPATIENT
Start: 2020-01-09 | End: 2020-01-11 | Stop reason: HOSPADM

## 2020-01-09 RX ORDER — SODIUM CHLORIDE, SODIUM LACTATE, POTASSIUM CHLORIDE, CALCIUM CHLORIDE 600; 310; 30; 20 MG/100ML; MG/100ML; MG/100ML; MG/100ML
INJECTION, SOLUTION INTRAVENOUS CONTINUOUS
Status: DISCONTINUED | OUTPATIENT
Start: 2020-01-09 | End: 2020-01-11 | Stop reason: HOSPADM

## 2020-01-09 RX ORDER — BUPIVACAINE HYDROCHLORIDE 5 MG/ML
INJECTION, SOLUTION EPIDURAL; INTRACAUDAL PRN
Status: DISCONTINUED | OUTPATIENT
Start: 2020-01-09 | End: 2020-01-09

## 2020-01-09 RX ORDER — NALOXONE HYDROCHLORIDE 0.4 MG/ML
.1-.4 INJECTION, SOLUTION INTRAMUSCULAR; INTRAVENOUS; SUBCUTANEOUS
Status: DISCONTINUED | OUTPATIENT
Start: 2020-01-09 | End: 2020-01-09

## 2020-01-09 RX ORDER — LIDOCAINE HYDROCHLORIDE 10 MG/ML
INJECTION, SOLUTION INFILTRATION; PERINEURAL PRN
Status: DISCONTINUED | OUTPATIENT
Start: 2020-01-09 | End: 2020-01-09

## 2020-01-09 RX ORDER — LANOLIN 100 %
OINTMENT (GRAM) TOPICAL
Status: DISCONTINUED | OUTPATIENT
Start: 2020-01-09 | End: 2020-01-11 | Stop reason: HOSPADM

## 2020-01-09 RX ORDER — LIDOCAINE HYDROCHLORIDE AND EPINEPHRINE 15; 5 MG/ML; UG/ML
INJECTION, SOLUTION EPIDURAL PRN
Status: DISCONTINUED | OUTPATIENT
Start: 2020-01-09 | End: 2020-01-09

## 2020-01-09 RX ORDER — IBUPROFEN 400 MG/1
800 TABLET, FILM COATED ORAL EVERY 6 HOURS PRN
Status: DISCONTINUED | OUTPATIENT
Start: 2020-01-09 | End: 2020-01-11 | Stop reason: HOSPADM

## 2020-01-09 RX ORDER — BUPIVACAINE HYDROCHLORIDE 2.5 MG/ML
10 INJECTION, SOLUTION EPIDURAL; INFILTRATION; INTRACAUDAL ONCE
Status: COMPLETED | OUTPATIENT
Start: 2020-01-09 | End: 2020-01-09

## 2020-01-09 RX ORDER — PHENYLEPHRINE HCL IN 0.9% NACL 1 MG/10 ML
100 SYRINGE (ML) INTRAVENOUS EVERY 5 MIN PRN
Status: DISCONTINUED | OUTPATIENT
Start: 2020-01-09 | End: 2020-01-11 | Stop reason: HOSPADM

## 2020-01-09 RX ORDER — LIDOCAINE HYDROCHLORIDE AND EPINEPHRINE 15; 5 MG/ML; UG/ML
3 INJECTION, SOLUTION EPIDURAL
Status: DISCONTINUED | OUTPATIENT
Start: 2020-01-09 | End: 2020-01-11 | Stop reason: HOSPADM

## 2020-01-09 RX ORDER — ONDANSETRON 4 MG/1
4 TABLET, ORALLY DISINTEGRATING ORAL EVERY 6 HOURS PRN
Status: DISCONTINUED | OUTPATIENT
Start: 2020-01-09 | End: 2020-01-11 | Stop reason: HOSPADM

## 2020-01-09 RX ORDER — ACETAMINOPHEN 325 MG/1
650 TABLET ORAL EVERY 4 HOURS PRN
Status: DISCONTINUED | OUTPATIENT
Start: 2020-01-09 | End: 2020-01-11 | Stop reason: HOSPADM

## 2020-01-09 RX ORDER — METHYLERGONOVINE MALEATE 0.2 MG/ML
200 INJECTION INTRAVENOUS
Status: DISCONTINUED | OUTPATIENT
Start: 2020-01-09 | End: 2020-01-11 | Stop reason: HOSPADM

## 2020-01-09 RX ORDER — LIDOCAINE 40 MG/G
CREAM TOPICAL
Status: DISCONTINUED | OUTPATIENT
Start: 2020-01-09 | End: 2020-01-11 | Stop reason: HOSPADM

## 2020-01-09 RX ADMIN — LIDOCAINE HYDROCHLORIDE AND EPINEPHRINE 4 ML: 15; 5 INJECTION, SOLUTION EPIDURAL at 16:23

## 2020-01-09 RX ADMIN — ACETAMINOPHEN 650 MG: 325 TABLET, FILM COATED ORAL at 10:03

## 2020-01-09 RX ADMIN — SODIUM CHLORIDE, POTASSIUM CHLORIDE, SODIUM LACTATE AND CALCIUM CHLORIDE: 600; 310; 30; 20 INJECTION, SOLUTION INTRAVENOUS at 15:09

## 2020-01-09 RX ADMIN — Medication 2 MILLI-UNITS/MIN: at 06:14

## 2020-01-09 RX ADMIN — LIDOCAINE HYDROCHLORIDE AND EPINEPHRINE 4 ML: 15; 5 INJECTION, SOLUTION EPIDURAL at 11:18

## 2020-01-09 RX ADMIN — BUPIVACAINE HYDROCHLORIDE: 5 INJECTION, SOLUTION EPIDURAL; INTRACAUDAL at 11:52

## 2020-01-09 RX ADMIN — LIDOCAINE HYDROCHLORIDE 50 MG: 10 INJECTION, SOLUTION INFILTRATION; PERINEURAL at 10:59

## 2020-01-09 RX ADMIN — Medication 10 ML/HR: at 11:30

## 2020-01-09 RX ADMIN — LIDOCAINE HYDROCHLORIDE 30 MG: 10 INJECTION, SOLUTION INFILTRATION; PERINEURAL at 16:18

## 2020-01-09 RX ADMIN — LIDOCAINE HYDROCHLORIDE 30 MG: 10 INJECTION, SOLUTION INFILTRATION; PERINEURAL at 11:12

## 2020-01-09 RX ADMIN — SODIUM CHLORIDE, POTASSIUM CHLORIDE, SODIUM LACTATE AND CALCIUM CHLORIDE: 600; 310; 30; 20 INJECTION, SOLUTION INTRAVENOUS at 11:52

## 2020-01-09 RX ADMIN — BUPIVACAINE HYDROCHLORIDE 10 ML: 2.5 INJECTION, SOLUTION EPIDURAL; INFILTRATION; INTRACAUDAL; PERINEURAL at 11:27

## 2020-01-09 RX ADMIN — SODIUM CHLORIDE, POTASSIUM CHLORIDE, SODIUM LACTATE AND CALCIUM CHLORIDE: 600; 310; 30; 20 INJECTION, SOLUTION INTRAVENOUS at 06:07

## 2020-01-09 RX ADMIN — BUPIVACAINE HYDROCHLORIDE 10 ML: 5 INJECTION, SOLUTION EPIDURAL; INTRACAUDAL; PERINEURAL at 16:25

## 2020-01-09 ASSESSMENT — ACTIVITIES OF DAILY LIVING (ADL)
AMBULATION: 0-->INDEPENDENT
BATHING: 0-->INDEPENDENT
FALL_HISTORY_WITHIN_LAST_SIX_MONTHS: NO
DRESS: 0-->INDEPENDENT
RETIRED_COMMUNICATION: 0-->UNDERSTANDS/COMMUNICATES WITHOUT DIFFICULTY
TOILETING: 0-->INDEPENDENT
COGNITION: 0 - NO COGNITION ISSUES REPORTED
TRANSFERRING: 0-->INDEPENDENT
RETIRED_EATING: 0-->INDEPENDENT
SWALLOWING: 0-->SWALLOWS FOODS/LIQUIDS WITHOUT DIFFICULTY

## 2020-01-09 ASSESSMENT — LIFESTYLE VARIABLES: TOBACCO_USE: 1

## 2020-01-09 NOTE — ANESTHESIA PROCEDURE NOTES
Peripheral nerve/Neuraxial procedure note : epidural catheter  Pre-Procedure  Performed by  Vito Aguilar APRN CRNA   Location: OB    Procedure Times:1/9/2020 10:57 AM and 1/9/2020 11:30 AM  Pre-Anesthestic Checklist: patient identified, IV checked, site marked, risks and benefits discussed, informed consent, monitors and equipment checked, pre-op evaluation and at physician/surgeon's request    Timeout  Correct Patient: Yes   Correct Procedure: Yes   Correct Site: Yes     Correct Position: Yes     .   Procedure Documentation    Diagnosis:Labor Pain.    Procedure: epidural catheter, .   Patient Position:sitting Insertion Site:L3-4  (midline approach) Injection technique: LORT air   Local skin infiltrated with 8 mL of 1% lidocaine.  KRISTIN at 8 cm    Patient Prep/Sterile Barriers; chlorhexidine gluconate and isopropyl alcohol.  .  Needle: ToZhongSouy needle   Needle Gauge: 17.    Needle Length (Inches) 5   # of attempts: 1 and  # of redirects:  2 .    Catheter: 20 G . .  Catheter threaded easily  7 cm epidural space.  15 cm at skin.   .    Assessment/Narrative  Paresthesias: No.  .  .  Aspiration negative for heme or CSF  . Test dose of 4 mL lidocaine 1.5% w/ 1:200,000 epinephrine at 11:18.  Test dose negative for signs of intravascular, subdural or intrathecal injection. Comments:  No complications encountered.  Pt tolerated procedure well.

## 2020-01-09 NOTE — PROGRESS NOTES
Comfortable with epidural  FHTs reactive, occasional variable  Cx 3 cm 75%, some caput - minimal change  IUPC placed.  Observe

## 2020-01-09 NOTE — ANESTHESIA PREPROCEDURE EVALUATION
Anesthesia Pre-Procedure Evaluation    Patient: Alize Fountain   MRN: [id number] : 1995          Preoperative Diagnosis: * No pre-op diagnosis entered *    * No procedures listed *    No past medical history on file.  Past Surgical History:   Procedure Laterality Date     DILATION AND CURETTAGE SUCTION N/A 2019    Procedure: DILATION AND CURETTAGE SUCTION;  Surgeon: Cody Jenkins MD;  Location:  OR       Anesthesia Evaluation       history and physical reviewed . Pt has had prior anesthetic.     No history of anesthetic complications          ROS/MED HX    ENT/Pulmonary:  - neg pulmonary ROS   (+)allergic rhinitis, tobacco use, Past use , . .    Neurologic:  - neg neurologic ROS     Cardiovascular:  - neg cardiovascular ROS       METS/Exercise Tolerance:  >4 METS   Hematologic:  - neg hematologic  ROS       Musculoskeletal:   (+)  other musculoskeletal- TMJ      GI/Hepatic:     (+) GERD       Renal/Genitourinary:  - ROS Renal section negative       Endo:     (+) Obesity, .      Psychiatric:  - neg psychiatric ROS       Infectious Disease:  - neg infectious disease ROS       Malignancy:      - no malignancy   Other:    (+) Possibly pregnant                    neg OB ROS            Physical Exam  Normal systems: cardiovascular, pulmonary and dental    Airway   Mallampati: II  TM distance: > 3 FB  Neck ROM: full  Mouth opening: > 3 cm    Dental     Cardiovascular       Pulmonary    breath sounds clear to auscultation            Lab Results   Component Value Date    WBC 10.8 2020    HGB 11.3 (L) 2020    HCT 34.4 (L) 2020     2020     2019    POTASSIUM 3.7 2019    CHLORIDE 105 2019    CO2 25 2019    BUN 6 (L) 2019    CR 0.58 (L) 2019    GLC 96 2019    ADRIAN 9.3 2019    ALBUMIN 3.8 2019    PROTTOTAL 6.9 2019    ALT 6 (L) 2019    AST 9 (L) 2019    ALKPHOS 51 2019    BILITOTAL 0.3 2019  "   LIPASE 10 (L) 06/23/2019    HCG Negative 03/13/2019    HCGS Negative 03/13/2019       Preop Vitals  BP Readings from Last 3 Encounters:   01/09/20 126/71   01/02/20 120/70   12/27/19 120/60    Pulse Readings from Last 3 Encounters:   01/02/20 81   12/27/19 76   12/19/19 84      Resp Readings from Last 3 Encounters:   01/09/20 18   01/02/20 20   12/19/19 20    SpO2 Readings from Last 3 Encounters:   11/27/19 98%   11/03/19 98%   06/23/19 100%      Temp Readings from Last 1 Encounters:   01/09/20 97.3  F (36.3  C) (Temporal)    Ht Readings from Last 1 Encounters:   11/03/19 1.702 m (5' 7\")      Wt Readings from Last 1 Encounters:   01/02/20 119.6 kg (263 lb 11.2 oz)    Estimated body mass index is 41.3 kg/m  as calculated from the following:    Height as of 11/3/19: 1.702 m (5' 7\").    Weight as of 1/2/20: 119.6 kg (263 lb 11.2 oz).       Anesthesia Plan      History & Physical Review      ASA Status:  2 .  OB Epidural Asa: 2   NPO Status:  > 6 hours    Plan for Epidural          Postoperative Care      Consents  Anesthetic plan, risks, benefits and alternatives discussed with:  Patient and Patient..                 ROB Tapia CRNA  "

## 2020-01-09 NOTE — H&P
[facility] Labor and Delivery History and Physical    Alize Fountain MRN# [id number]   Age: 24 year old YOB: 1995     Date of Admission:  2020    Primary care provider: Kisha Hugo           Chief Complaint:   Alize Fountain is a 24 year old  at 40w0d by [facility] and [facility] admitted for induction of labor, indication 40 weeks gestation, previous early pregnancy loss x 3.          Pregnancy history:     OBSTETRIC HISTORY:    OB History    Para Term  AB Living   4 0 0 0 3 0   SAB TAB Ectopic Multiple Live Births   3 0 0 0 0      # Outcome Date GA Lbr Kip/2nd Weight Sex Delivery Anes PTL Lv   4 Current            3 SAB 18     SAB   FD   2 SAB      SAB   FD   1 SAB      SAB   FD       EDC: Estimated Date of Delivery: 2020    Prenatal Labs:   Lab Results   Component Value Date    ABO A 2019    RH Pos 2019    AS Neg 2019    HEPBANG Nonreactive 2019    HGB 11.3 (L) 2019       GBS Status:   No results found for: GBS    Active Problem List  Patient Active Problem List   Diagnosis     Abdominal pain, generalized     Allergic state     Contraceptive management     HPV vaccine counseling     SAB (spontaneous )     Supervision of normal pregnancy in first trimester     TMJ pain dysfunction syndrome     40 weeks gestation of pregnancy       Medication Prior to Admission  Medications Prior to Admission   Medication Sig Dispense Refill Last Dose     aspirin 81 MG EC tablet Take 81 mg by mouth daily   2020 at Unknown time     Prenatal MV-Min-Fe Fum-FA-DHA (PRENATAL 1 PO)    2020 at Unknown time   .        Maternal Past Medical History:   No past medical history on file.  Past Surgical History:   Procedure Laterality Date     DILATION AND CURETTAGE SUCTION N/A 2019    Procedure: DILATION AND CURETTAGE SUCTION;  Surgeon: Cody Jenkins MD;  Location:  OR                       Family History:   This  patient has no significant family history            Social History:   This patient has no significant social history         Review of Systems:   CONSTITUTIONAL: NEGATIVE for fever, chills, change in weight  ENT/MOUTH: NEGATIVE for ear, mouth and throat problems  RESP: NEGATIVE for significant cough or SOB  CV: NEGATIVE for chest pain, palpitations or peripheral edema          Physical Exam:     Patient Vitals for the past 8 hrs:   BP Temp Temp src Resp   20 0544 126/71 97.3  F (36.3  C) Temporal 18     Gen: AONAD  CV: RRR  Resp: CTA  Abd: gravid  Ext: tr edema    Cervix: 2+ per RN  Membranes: intact  EFW: 7.5-8  Presentation:Cephalic    Fetal Heart Rate Tracing: reactive and reassuring  Tocometer: external monitor        Assessment:   Alize Fountain is a 24 year old  at 40w0d admitted with induction of labor, indication 40 weeks gestation, previous early pregnancy loss x 3.          Plan:   Admit - see IP orders    Cody Jenkins MD

## 2020-01-09 NOTE — PROGRESS NOTES
Regular mild contractions  FHTs with small variables but good variability/reactivity  Cx 2-3 75%, 0 station, bloody show  AROM with ctx, clear/bloody  CPM

## 2020-01-09 NOTE — PROGRESS NOTES
Pt arrived to unit at 0540 for scheduled in Induction with Cody Jenkins. VSS. IV started. Category 1 tracing. Contractions irregular, pt not feeling. See flowsheets for assessment.

## 2020-01-09 NOTE — PROGRESS NOTES
Pt continues to be comfortable, ledezma patent and draining. IUPC in place, category I tracing noted, contractions every 2-3 mins. Will continue to monitor and provide interventions as needed.

## 2020-01-10 LAB
HGB BLD-MCNC: 9.6 G/DL (ref 11.7–15.7)
T PALLIDUM AB SER QL: NONREACTIVE

## 2020-01-10 PROCEDURE — 25000132 ZZH RX MED GY IP 250 OP 250 PS 637: Performed by: OBSTETRICS & GYNECOLOGY

## 2020-01-10 PROCEDURE — 99207 ZZC NO CHARGE LOS: CPT | Performed by: OBSTETRICS & GYNECOLOGY

## 2020-01-10 PROCEDURE — 36415 COLL VENOUS BLD VENIPUNCTURE: CPT | Performed by: OBSTETRICS & GYNECOLOGY

## 2020-01-10 PROCEDURE — 85018 HEMOGLOBIN: CPT | Performed by: OBSTETRICS & GYNECOLOGY

## 2020-01-10 PROCEDURE — 12000000 ZZH R&B MED SURG/OB

## 2020-01-10 RX ADMIN — SENNOSIDES AND DOCUSATE SODIUM 1 TABLET: 8.6; 5 TABLET ORAL at 02:33

## 2020-01-10 RX ADMIN — IBUPROFEN 800 MG: 400 TABLET ORAL at 16:48

## 2020-01-10 RX ADMIN — SENNOSIDES AND DOCUSATE SODIUM 1 TABLET: 8.6; 5 TABLET ORAL at 09:50

## 2020-01-10 RX ADMIN — IBUPROFEN 800 MG: 400 TABLET ORAL at 02:19

## 2020-01-10 NOTE — PROGRESS NOTES
Single viable baby boy born via spontaneous vaginal delivery on 1/09/2020 at 1747. Delivered by Dr. JANEL Jenkins. Spontaneous respirations, with vigorous cry.   Induced  Labor at 40 weeks gestation   Mom's GBS status Negative with antibiotic treatment not indicated 4 hours prior to delivery.   baby placed on mother's abdomen for  ID bands applied to baby,mother, and S.O. Initial    Will continue to monitor and provide interventions as needed.

## 2020-01-10 NOTE — L&D DELIVERY NOTE
Delivery Note    Second stage about 30 minutes  Good FHTs through out     over an intact perineum of a vigorous infant male  Placenta spontaneous and intact  2nd degree ML tear repaired in 2 layers using 3-0 vicryl     cc

## 2020-01-10 NOTE — ANESTHESIA POSTPROCEDURE EVALUATION
Patient: Alize Fountain    * No procedures listed *    Diagnosis:* No pre-op diagnosis entered *  Diagnosis Additional Information: No value filed.    Anesthesia Type:  Epidural    Note:  Anesthesia Post Evaluation    Patient location during evaluation: Bedside  Patient participation: Able to fully participate in evaluation  Level of consciousness: awake and alert  Pain management: adequate  Airway patency: patent  Cardiovascular status: acceptable  Respiratory status: acceptable  Hydration status: acceptable  PONV: none     Anesthetic complications: None    Comments: Post Epidural Evaluation: Pt has ambulated and voided without difficulty. No residual numbness or tingling.  No fever or chills.  Her epidural course was complicated with her tagaderm and tape being accidentally removed. Her old epidural catheter was removed and an new catheter replaced.  She was pleased with her epidural experience.        Last vitals:  Vitals:    01/09/20 2100 01/10/20 0223 01/10/20 0224   BP:  118/66    Resp:  16    Temp: 98.6  F (37  C) 97.9  F (36.6  C)    SpO2:   99%         Electronically Signed By: ROB Tapia CRNA  January 10, 2020  9:06 AM

## 2020-01-10 NOTE — LACTATION NOTE
This note was copied from a baby's chart.  INPATIENT LACTATION CONSULT      Consult with Alize and cristina regarding breastfeeding.  Alize has been using the 24 mm nipple shield at times.  Alize just finished nursing the babe and did not use the shield with this feeding.  Alize states cristina was able to latch on with no problem and nursed for 5  minutes on the left side and 10 minutes on the right side. Alize states she notices obvious rooting with a strong latch during feedings.  Rhythmic and aggressive suckling also noted.  Instructed Alize on correct positioning and technique when latching babe on.  Alize is independent with latching babe onto breast.  Minimal assistance required.  Encouraged Alize on the importance of frequent feedings throughout the day (at least 8-12 feedings in a 24 hour period) and skin to skin contact.  Alize demonstrated and states she understands all information given.    Michelle Franco RN, IBCLC  Lactation Consultant  [facility] and [facility]

## 2020-01-10 NOTE — PROGRESS NOTES
Doing well PPD#1  General diet  Lochia normal  Breast  Voiding without difficulty  AVSS  Uterus firm  Hgb 9.6    A: Stable PPD#1  P: Continue postpartum care  Routine instructions  Home tomorrow

## 2020-01-10 NOTE — PROGRESS NOTES
Pt doing well. C/O pain in back at epidural insertions site. Ibuprofen given as well as ice pack. Breastfeeding sessions improving throughout shift. Bonding well with baby. VSS. Fundus firm 1/U.

## 2020-01-10 NOTE — ANESTHESIA PROCEDURE NOTES
Peripheral nerve/Neuraxial procedure note : epidural catheter  Pre-Procedure  Performed by  Vito Aguilar APRN CRNA   Location: OB    Procedure Times:1/9/2020 4:12 PM and 1/9/2020 4:30 PM  Pre-Anesthestic Checklist: patient identified, IV checked, site marked, risks and benefits discussed, informed consent, monitors and equipment checked, pre-op evaluation and at physician/surgeon's request    Timeout  Correct Patient: Yes   Correct Procedure: Yes   Correct Site: Yes     Correct Position: Yes     .   Procedure Documentation    Diagnosis:Labor Pain.    Procedure: epidural catheter, .   Patient Position:sitting Insertion Site:L3-4  (midline approach) Injection technique: LORT air   Local skin infiltrated with 3 mL of 1% lidocaine.  KRISTIN at 9 cm    Patient Prep/Sterile Barriers; mask, sterile gloves, chlorhexidine gluconate and isopropyl alcohol, patient draped.  .  Needle: Touhy needle   Needle Gauge: 17.    Needle Length (Inches) 3.5   # of attempts: 1 and  # of redirects:  1 .    Catheter: 20 G . .  Catheter threaded easily  6 cm epidural space.  15 cm at skin.   .    Assessment/Narrative  Paresthesias: No.  .  .  Aspiration negative for heme or CSF  . Test dose of 4 mL lidocaine 1.5% w/ 1:200,000 epinephrine at 16:23.  Test dose negative for signs of intravascular, subdural or intrathecal injection. Comments:  No complications encountered.  Pt tolerated procedure well.

## 2020-01-11 VITALS
TEMPERATURE: 98.7 F | RESPIRATION RATE: 16 BRPM | OXYGEN SATURATION: 98 % | HEART RATE: 81 BPM | DIASTOLIC BLOOD PRESSURE: 57 MMHG | SYSTOLIC BLOOD PRESSURE: 115 MMHG

## 2020-01-11 PROCEDURE — 25000132 ZZH RX MED GY IP 250 OP 250 PS 637: Performed by: OBSTETRICS & GYNECOLOGY

## 2020-01-11 PROCEDURE — 99207 ZZC NO CHARGE LOS: CPT | Performed by: OBSTETRICS & GYNECOLOGY

## 2020-01-11 RX ORDER — IBUPROFEN 800 MG/1
800 TABLET, FILM COATED ORAL EVERY 8 HOURS PRN
Qty: 50 TABLET | Refills: 1 | Status: SHIPPED | OUTPATIENT
Start: 2020-01-11 | End: 2021-11-10

## 2020-01-11 RX ADMIN — SENNOSIDES AND DOCUSATE SODIUM 2 TABLET: 8.6; 5 TABLET ORAL at 07:00

## 2020-01-11 RX ADMIN — IBUPROFEN 800 MG: 400 TABLET ORAL at 06:59

## 2020-01-11 NOTE — PROGRESS NOTES
Discharge education completed both written and verbal and questions answered. Discharged home with baby. Bands compared with baby and all matched. Accompanied home by . Discharged ambulatory. Escorted to vehicle per Alejandra LINDA.

## 2020-01-11 NOTE — PLAN OF CARE
VSS. Afebrile. Bleeding WDL for this stage of Postpartum. No clots. Afebrile.  Independent with pericares.  Nipples are sore and tender. Applied lansinol cream and cold packs. Patient denies perineal pain.  Declines pain medications. Increasingly independent with cares. Voiding spontaneously and without difficulty.  Continuing to gain confidence with breastfeeding. Positioning independently. Using nipple shield now.

## 2020-01-11 NOTE — DISCHARGE SUMMARY
Admitting Diagnosis: 40 weeks gestation, hx of 3 previous first trimester losses  Discharge Diagnosis: same, viable infant male  Delivering Physician: DARIANA  Primary Physician: DARIANA    Clinical and Delivery History: 24 year old  admitted at 40 weeks for induction of labor.  Pitocin utilized.  AROM at 3-4 cm.  IUPC utilized.  Epidural.  30  Minute second stage with  of an infant male, weight 8-1, Apgars 9,9    Post Partum Course: Uncomplicated  Breast.    Hgb: 9.6 (11.3 on admission)    Rhogam: A+  MMR: immune  Meds: PNV with Fe    Follow up: 6 weeks

## 2020-01-11 NOTE — PLAN OF CARE
Pt doing well, VSS. She is feeling better about breastfeeding this afternoon. She feels the nipple shield has helped a lot, she is becoming more comfortable with positioning. She is bonding well with baby, and very attentive.

## 2020-01-13 ENCOUNTER — LACTATION ENCOUNTER (OUTPATIENT)
Age: 25
End: 2020-01-13

## 2020-01-13 ENCOUNTER — HOSPITAL ENCOUNTER (OUTPATIENT)
Dept: OBGYN | Facility: OTHER | Age: 25
End: 2020-01-13
Attending: OBSTETRICS & GYNECOLOGY
Payer: COMMERCIAL

## 2020-01-13 PROCEDURE — S9443 LACTATION CLASS: HCPCS | Performed by: REGISTERED NURSE

## 2020-01-13 NOTE — LACTATION NOTE
This note was copied from a baby's chart.  Outpatient Lactation Visit    Rogers Fountain  [id number]    Consultation Date: 2020     Reason for Lactation Referral: Initial Lactation Consult    Baby's : 2020    Baby's Current Age: 4 day old  Baby's Gestational Age: Gestational Age: 40w0d    Primary Care Provider: Isabella Mac    Presenting Problem (concerns as stated by parent): no concerns - repeat bilirubin level per parents request    MATERNAL HISTORY   History of Breast Surgery: no  Breast Changes During Pregnancy: no  Breast Feeding History: primigravida  Maternal Meds: daily prenatal vitamin  Pregnancy Complications: none  Anesthesia during labor: epidural    MATERNAL ASSESSMENT    Breast Size: average, symmetrical, soft after feeding and filling prior to feeding  Nipple Appearance - Left: slightly cracked, with signs of healing, education on further healing techniques provided  Nipple Appearance - Right: slightly cracked, with signs of healing, education on further healing techniques provided  Nipple Erectility - Left: erect with stimulation  Nipple Erectility - Right: erect with stimulation  Areolas Compressibility: soft  Nipple Size: average  Special Equipment Used: 24 mm nipple shield  Day mother reports milk came in:  Day 3    INFANT ASSESSMENT    Oral Anatomy  Mouth: normal  Palate: normal  Jaw: normal  Tongue: normal  Frenulum: normal   Digital Suck Exam: root    FEEDING   Feeding Time: aggressively for 25 minutes  Position:  Cross-cradle  Effort to Latch: awake and alert, latched easily  Duration of Breast Feeding: Right Breast: 15; Left Breast: 10  Results: excellent breast feed    Volume of Intake:    Birth Weight: 8 lb 1 oz    Hospital discharge weight: 7 lb 11 oz    Today's Weight 7 lb 4.2 oz    Total Intake: 1.2 oz  Output: 0 soil diapers in last 24 hours, 3-4 wet diapers in last 24 hours    LATCH Score:   Latch: 2 - Good Latch  Audible Swallowin - Spontaneous &  frequent  Type of Nipple: (Breast/Nipple) 2 - Everted  Comfort: 2 - Soft, Nontender  Hold: 2 - No Assist   Total LATCH Score:  10    FEEDING PLAN    Home Feeding Plan: Continue to feed on demand when  elicits feeding cues with deep latch.  Babe should be eating 8-12 times in a 24 hour period.  Exclusivity explained and encouraged in the early weeks to establish breastfeeding and order in milk supply.  Rooming-in encouraged with explanation of the benefits.  Continue to apply expressed breast milk and Lanolin cream to nipples after feedings for healing and comfort.  Postpartum breastfeeding assessment completed and education provided.  Items included in the education are:     proper positioning and latch    effectiveness of feeding    manual expression    handling and storing breastmilk    maintenance of breastfeeding for the first 6 months    sign/symptoms of infant feeding issues requiring referral to qualified health care provider    LACTATION COMMENTS   Bilirubin level at 15.7 mg/dl.  Dr. Loredo notified.  Patient status reported. Order to return in 2 days for a repeat bilirubin level and a weight check.  Continue to nurse 8-12 times per day.  Return to clinic sooner if babe looks more jaundice, is having difficulty with feedings or any questions or concerns.  Deep latch explained for proper positioning of breast in infant's mouth, maximizing milk transfer and comfort.  Reassurance and encouragement provided in regard to mom's concerns about milk supply.  Follow-up support information provided.  Parents plan to keep [place] Well-Child Check with Dr. Mac as scheduled for 2 week well child check.      Face-to-face Time: 60 minutes with assessment and education.    Michelle Franco RN  2020  10:14 AM

## 2020-01-15 ENCOUNTER — LACTATION ENCOUNTER (OUTPATIENT)
Age: 25
End: 2020-01-15

## 2020-01-15 ENCOUNTER — TELEPHONE (OUTPATIENT)
Dept: OBGYN | Facility: OTHER | Age: 25
End: 2020-01-15

## 2020-01-15 NOTE — LETTER
January 15, 2020      Alize Fountain  [address] [address] [address]        To Whom It May Concern,     Alize Fountain was admitted at [facility] 1/9/2020 - 1/11/2020 for the birth of her child. She was accompanied by her spouse, Mani. He provided support and transportation for Alize during this time.      Sincerely,        Cody Jenkins MD

## 2020-01-15 NOTE — TELEPHONE ENCOUNTER
Alize stopped by the [address] window and her  Mani needs a letter for work stating that he was with her from 1/9/20 - 1/11/20 for the delivery of their baby.  If you can put it on MyChart for them it would be great otherwise they can come pick it up.  Julienne Monsalve on 1/15/2020 at 11:51 AM

## 2020-01-15 NOTE — LACTATION NOTE
This note was copied from a baby's chart.  Rogers is here with parents for a repeat weight check and a repeat bilirubin level.  Rogers has been strictly breastfeeding since birth and his weight at his last clinic appointment was 7 lb 4.2 oz putting him near the 10 percentile for weight loss.  Alize (mom) states he has been nursing every 2 hours and appears content post breastfeeding sessions. His last bilirubin level was 15.7 mg/dl two days ago.  Parents feel he looks less yellow, his stools have transitioned, and he is more aggressive while nursing.    Rogers's weight today is 7 lb 7 oz showing a weight gain of almost 3 oz in 2 days. This is an adequate weight gain for a breastfeeding .  Rogers's bilirubin level today is 15.0 mg/dl. Dr. Jolly notified. Patient status reported.  No new orders received.  Will follow-up with Dr. Mac as scheduled on  for his well child check.

## 2020-01-15 NOTE — TELEPHONE ENCOUNTER
Letter created and patient notified via [employer].    Deborah Sarmiento RN...................1/15/2020 1:24 PM

## 2020-01-28 ENCOUNTER — HOSPITAL ENCOUNTER (EMERGENCY)
Facility: OTHER | Age: 25
Discharge: HOME | End: 2020-01-28
Attending: EMERGENCY MEDICINE | Admitting: EMERGENCY MEDICINE
Payer: COMMERCIAL

## 2020-01-28 VITALS
TEMPERATURE: 97.3 F | WEIGHT: 252 LBS | RESPIRATION RATE: 16 BRPM | SYSTOLIC BLOOD PRESSURE: 113 MMHG | OXYGEN SATURATION: 99 % | HEIGHT: 67 IN | BODY MASS INDEX: 39.55 KG/M2 | DIASTOLIC BLOOD PRESSURE: 78 MMHG

## 2020-01-28 DIAGNOSIS — R10.9 FLANK PAIN: ICD-10-CM

## 2020-01-28 LAB
ALBUMIN SERPL-MCNC: 3.8 G/DL (ref 3.5–5.7)
ALBUMIN UR-MCNC: 10 MG/DL
ALP SERPL-CCNC: 105 U/L (ref 34–104)
ALT SERPL W P-5'-P-CCNC: 11 U/L (ref 7–52)
ANION GAP SERPL CALCULATED.3IONS-SCNC: 7 MMOL/L (ref 3–14)
APPEARANCE UR: CLEAR
AST SERPL W P-5'-P-CCNC: 10 U/L (ref 13–39)
BACTERIA #/AREA URNS HPF: ABNORMAL /HPF
BASOPHILS # BLD AUTO: 0.1 10E9/L (ref 0–0.2)
BASOPHILS NFR BLD AUTO: 0.9 %
BILIRUB SERPL-MCNC: 0.3 MG/DL (ref 0.3–1)
BILIRUB UR QL STRIP: NEGATIVE
BUN SERPL-MCNC: 14 MG/DL (ref 7–25)
CALCIUM SERPL-MCNC: 9.1 MG/DL (ref 8.6–10.3)
CHLORIDE SERPL-SCNC: 105 MMOL/L (ref 98–107)
CO2 SERPL-SCNC: 26 MMOL/L (ref 21–31)
COLOR UR AUTO: YELLOW
CREAT SERPL-MCNC: 0.79 MG/DL (ref 0.6–1.2)
DIFFERENTIAL METHOD BLD: NORMAL
EOSINOPHIL # BLD AUTO: 0.2 10E9/L (ref 0–0.7)
EOSINOPHIL NFR BLD AUTO: 2.2 %
ERYTHROCYTE [DISTWIDTH] IN BLOOD BY AUTOMATED COUNT: 13 % (ref 10–15)
GFR SERPL CREATININE-BSD FRML MDRD: >90 ML/MIN/{1.73_M2}
GLUCOSE SERPL-MCNC: 91 MG/DL (ref 70–105)
GLUCOSE UR STRIP-MCNC: NEGATIVE MG/DL
HCT VFR BLD AUTO: 38.2 % (ref 35–47)
HGB BLD-MCNC: 12.4 G/DL (ref 11.7–15.7)
HGB UR QL STRIP: ABNORMAL
IMM GRANULOCYTES # BLD: 0 10E9/L (ref 0–0.4)
IMM GRANULOCYTES NFR BLD: 0.3 %
KETONES UR STRIP-MCNC: NEGATIVE MG/DL
LEUKOCYTE ESTERASE UR QL STRIP: ABNORMAL
LYMPHOCYTES # BLD AUTO: 2.1 10E9/L (ref 0.8–5.3)
LYMPHOCYTES NFR BLD AUTO: 20.4 %
MCH RBC QN AUTO: 27 PG (ref 26.5–33)
MCHC RBC AUTO-ENTMCNC: 32.5 G/DL (ref 31.5–36.5)
MCV RBC AUTO: 83 FL (ref 78–100)
MONOCYTES # BLD AUTO: 0.5 10E9/L (ref 0–1.3)
MONOCYTES NFR BLD AUTO: 5 %
MUCOUS THREADS #/AREA URNS LPF: PRESENT /LPF
NEUTROPHILS # BLD AUTO: 7.4 10E9/L (ref 1.6–8.3)
NEUTROPHILS NFR BLD AUTO: 71.2 %
NITRATE UR QL: NEGATIVE
PH UR STRIP: 5.5 PH (ref 5–7)
PLATELET # BLD AUTO: 317 10E9/L (ref 150–450)
POTASSIUM SERPL-SCNC: 3.8 MMOL/L (ref 3.5–5.1)
PROT SERPL-MCNC: 7.1 G/DL (ref 6.4–8.9)
RBC # BLD AUTO: 4.59 10E12/L (ref 3.8–5.2)
RBC #/AREA URNS AUTO: 6 /HPF
SODIUM SERPL-SCNC: 138 MMOL/L (ref 134–144)
SOURCE: ABNORMAL
SP GR UR STRIP: 1.02 (ref 1–1.03)
SQUAMOUS #/AREA URNS AUTO: 5 /HPF (ref 0–1)
UROBILINOGEN UR STRIP-MCNC: NORMAL MG/DL (ref 0–2)
WBC # BLD AUTO: 10.4 10E9/L (ref 4–11)
WBC #/AREA URNS AUTO: 13 /HPF

## 2020-01-28 PROCEDURE — 25000132 ZZH RX MED GY IP 250 OP 250 PS 637: Performed by: EMERGENCY MEDICINE

## 2020-01-28 PROCEDURE — 36415 COLL VENOUS BLD VENIPUNCTURE: CPT | Performed by: EMERGENCY MEDICINE

## 2020-01-28 PROCEDURE — 85025 COMPLETE CBC W/AUTO DIFF WBC: CPT | Performed by: EMERGENCY MEDICINE

## 2020-01-28 PROCEDURE — 87086 URINE CULTURE/COLONY COUNT: CPT | Performed by: EMERGENCY MEDICINE

## 2020-01-28 PROCEDURE — 80053 COMPREHEN METABOLIC PANEL: CPT | Performed by: EMERGENCY MEDICINE

## 2020-01-28 PROCEDURE — 99283 EMERGENCY DEPT VISIT LOW MDM: CPT | Mod: Z6 | Performed by: EMERGENCY MEDICINE

## 2020-01-28 PROCEDURE — 99283 EMERGENCY DEPT VISIT LOW MDM: CPT | Performed by: EMERGENCY MEDICINE

## 2020-01-28 PROCEDURE — 81003 URINALYSIS AUTO W/O SCOPE: CPT | Performed by: EMERGENCY MEDICINE

## 2020-01-28 RX ORDER — CEPHALEXIN 500 MG/1
500 CAPSULE ORAL 3 TIMES DAILY
Qty: 21 CAPSULE | Refills: 0 | Status: SHIPPED | OUTPATIENT
Start: 2020-01-28 | End: 2020-02-04

## 2020-01-28 RX ORDER — ACETAMINOPHEN 500 MG
1000 TABLET ORAL ONCE
Status: COMPLETED | OUTPATIENT
Start: 2020-01-28 | End: 2020-01-28

## 2020-01-28 RX ADMIN — ACETAMINOPHEN 1000 MG: 500 TABLET, FILM COATED ORAL at 01:20

## 2020-01-28 ASSESSMENT — ENCOUNTER SYMPTOMS
CHILLS: 0
ABDOMINAL PAIN: 1
SHORTNESS OF BREATH: 0
FLANK PAIN: 1
DYSURIA: 0
CHEST TIGHTNESS: 0
VOMITING: 0
NAUSEA: 1
AGITATION: 0
FEVER: 0
LIGHT-HEADEDNESS: 0
ARTHRALGIAS: 0

## 2020-01-28 ASSESSMENT — MIFFLIN-ST. JEOR: SCORE: 1935.69

## 2020-01-28 NOTE — DISCHARGE INSTRUCTIONS
Develop fever or feeling worse, you could try the antibiotics for presumed bladder infection.  If the culture grows anything you should receive a phone call in 2 days time with instructions for antibiotics.  Return if worse.

## 2020-01-28 NOTE — ED AVS SNAPSHOT
[facility]  [address]  [place] [address]  Phone:  [phone]  Fax:  [phone]                                    Alize Fountain   MRN: [id number]    Department:  [facility]   Date of Visit:  1/28/2020           After Visit Summary Signature Page    I have received my discharge instructions, and my questions have been answered. I have discussed any challenges I see with this plan with the nurse or doctor.    ..........................................................................................................................................  Patient/Patient Representative Signature      ..........................................................................................................................................  Patient Representative Print Name and Relationship to Patient    ..................................................               ................................................  Date                                   Time    ..........................................................................................................................................  Reviewed by Signature/Title    ...................................................              ..............................................  Date                                               Time          22EPIC Rev 08/18

## 2020-01-28 NOTE — ED TRIAGE NOTES
Patient complaining of post partum cramping.   Stated she delivered about two weeks ago and is having severe cramping that started tonight.   Patient reports scant amount of bleeding with the cramping.  Is nauseated.

## 2020-01-28 NOTE — ED PROVIDER NOTES
History     Chief Complaint   Patient presents with     Abdominal Cramping     HPI  Alize Fountain is a 24 year old female who is 19 days postpartum from spontaneous vaginal delivery.  She says things have been going well.  Still having a small amount of vaginal bleeding but it is getting less every day.  No other discharge.  Has been feeling well otherwise, has not noticed any change in bowel or bladder.  This evening she had an onset of severe pain and cramping.  She says it is across her upper abdomen and low back.  It does at times go down into her lower abdomen and pelvic area.  She does not believe it is originating low down.  Does not feel like menstrual cramping or contractions.  The pain is constant but comes and goes somewhat in waves.  Currently is at a 6 or 7 out of 10.  She has not taken anything for the pain.  She says it is worse when she lays flat.  Nothing helps it.  Did have some nausea but no vomiting.  This began about 7:30 PM.  Her last meal was about 4 hours prior to that.    Allergies:  Allergies   Allergen Reactions     Azithromycin Rash     Gin [Alcohol] Swelling     Eye swelling and itching        Problem List:    Patient Active Problem List    Diagnosis Date Noted     40 weeks gestation of pregnancy 2020     Priority: Medium      (normal spontaneous vaginal delivery) 2020     Priority: Medium     SAB (spontaneous ) 2016     Priority: Medium     HPV vaccine counseling 2016     Priority: Medium     Supervision of normal pregnancy in first trimester 2016     Priority: Medium     Contraceptive management 2015     Priority: Medium     TMJ pain dysfunction syndrome 2014     Priority: Medium     Abdominal pain, generalized 10/14/2009     Priority: Medium     Overview:   IMO Update 10/11       Allergic state 2007     Priority: Medium     Overview:   IMO Update 10/11          Past Medical History:    History reviewed. No pertinent past  "medical history.    Past Surgical History:    Past Surgical History:   Procedure Laterality Date     DILATION AND CURETTAGE SUCTION N/A 2019    Procedure: DILATION AND CURETTAGE SUCTION;  Surgeon: Cody Jenkins MD;  Location:  OR       Family History:    History reviewed. No pertinent family history.    Social History:  Marital Status:   [2]  Social History     Tobacco Use     Smoking status: Former Smoker     Packs/day: 0.10     Types: Cigarettes     Last attempt to quit: 2019     Years since quittin.7     Smokeless tobacco: Never Used   Substance Use Topics     Alcohol use: No     Drug use: Never        Medications:    cephALEXin (KEFLEX) 500 MG capsule  Prenatal MV-Min-Fe Fum-FA-DHA (PRENATAL 1 PO)  ibuprofen (ADVIL/MOTRIN) 800 MG tablet          Review of Systems   Constitutional: Negative for chills and fever.   HENT: Negative for congestion.    Eyes: Negative for visual disturbance.   Respiratory: Negative for chest tightness and shortness of breath.    Cardiovascular: Negative for chest pain.   Gastrointestinal: Positive for abdominal pain and nausea. Negative for vomiting.   Genitourinary: Positive for flank pain. Negative for dysuria.   Musculoskeletal: Negative for arthralgias.   Skin: Negative for rash.   Neurological: Negative for light-headedness.   Psychiatric/Behavioral: Negative for agitation.       Physical Exam   BP: 113/78  Heart Rate: 92  Temp: 97.3  F (36.3  C)  Resp: 16  Height: 170.2 cm (5' 7\")  Weight: 114.3 kg (252 lb)  SpO2: 99 %      Physical Exam  Vitals signs and nursing note reviewed.   Constitutional:       Appearance: Normal appearance.   HENT:      Head: Normocephalic and atraumatic.   Eyes:      Conjunctiva/sclera: Conjunctivae normal.   Cardiovascular:      Rate and Rhythm: Normal rate and regular rhythm.      Heart sounds: Normal heart sounds.   Pulmonary:      Effort: Pulmonary effort is normal.      Breath sounds: Normal breath sounds.   Abdominal:      " General: Abdomen is flat. Bowel sounds are normal. There is no distension.      Tenderness: There is no abdominal tenderness. There is right CVA tenderness. There is no left CVA tenderness.      Comments: Really has no tenderness to palpation anywhere in her abdomen.  Specifically there is no tenderness over the suprapubic or pelvic area   Skin:     General: Skin is warm and dry.   Neurological:      General: No focal deficit present.      Mental Status: She is alert and oriented to person, place, and time.   Psychiatric:         Mood and Affect: Mood normal.         Behavior: Behavior normal.         ED Course        Procedures             Results for orders placed or performed during the hospital encounter of 01/28/20 (from the past 24 hour(s))   UA reflex to Microscopic and Culture   Result Value Ref Range    Color Urine Yellow     Appearance Urine Clear     Glucose Urine Negative NEG^Negative mg/dL    Bilirubin Urine Negative NEG^Negative    Ketones Urine Negative NEG^Negative mg/dL    Specific Gravity Urine 1.025 1.003 - 1.035    Blood Urine Moderate (A) NEG^Negative    pH Urine 5.5 5.0 - 7.0 pH    Protein Albumin Urine 10 (A) NEG^Negative mg/dL    Urobilinogen mg/dL Normal 0.0 - 2.0 mg/dL    Nitrite Urine Negative NEG^Negative    Leukocyte Esterase Urine Large (A) NEG^Negative    Source Midstream Urine     RBC Urine 6 (A) OTO2^O - 2 /HPF    WBC Urine 13 (A) OTO5^0 - 5 /HPF    Bacteria Urine Few (A) NEG^Negative /HPF    Squamous Epithelial /HPF Urine 5 (H) 0 - 1 /HPF    Mucous Urine Present (A) NEG^Negative /LPF   Comprehensive metabolic panel   Result Value Ref Range    Sodium 138 134 - 144 mmol/L    Potassium 3.8 3.5 - 5.1 mmol/L    Chloride 105 98 - 107 mmol/L    Carbon Dioxide 26 21 - 31 mmol/L    Anion Gap 7 3 - 14 mmol/L    Glucose 91 70 - 105 mg/dL    Urea Nitrogen 14 7 - 25 mg/dL    Creatinine 0.79 0.60 - 1.20 mg/dL    GFR Estimate >90 >60 mL/min/[1.73_m2]    GFR Estimate If Black >90 >60  mL/min/[1.73_m2]    Calcium 9.1 8.6 - 10.3 mg/dL    Bilirubin Total 0.3 0.3 - 1.0 mg/dL    Albumin 3.8 3.5 - 5.7 g/dL    Protein Total 7.1 6.4 - 8.9 g/dL    Alkaline Phosphatase 105 (H) 34 - 104 U/L    ALT 11 7 - 52 U/L    AST 10 (L) 13 - 39 U/L   CBC with platelets differential   Result Value Ref Range    WBC 10.4 4.0 - 11.0 10e9/L    RBC Count 4.59 3.8 - 5.2 10e12/L    Hemoglobin 12.4 11.7 - 15.7 g/dL    Hematocrit 38.2 35.0 - 47.0 %    MCV 83 78 - 100 fl    MCH 27.0 26.5 - 33.0 pg    MCHC 32.5 31.5 - 36.5 g/dL    RDW 13.0 10.0 - 15.0 %    Platelet Count 317 150 - 450 10e9/L    Diff Method Automated Method     % Neutrophils 71.2 %    % Lymphocytes 20.4 %    % Monocytes 5.0 %    % Eosinophils 2.2 %    % Basophils 0.9 %    % Immature Granulocytes 0.3 %    Absolute Neutrophil 7.4 1.6 - 8.3 10e9/L    Absolute Lymphocytes 2.1 0.8 - 5.3 10e9/L    Absolute Monocytes 0.5 0.0 - 1.3 10e9/L    Absolute Eosinophils 0.2 0.0 - 0.7 10e9/L    Absolute Basophils 0.1 0.0 - 0.2 10e9/L    Abs Immature Granulocytes 0.0 0 - 0.4 10e9/L       Medications   acetaminophen (TYLENOL) tablet 1,000 mg (1,000 mg Oral Given 1/28/20 0120)       Assessments & Plan (with Medical Decision Making)     I have reviewed the nursing notes.    I have reviewed the findings, diagnosis, plan and need for follow up with the patient.  Blood work reassuring.  Urinalysis with some blood in urine, leukocyte esterase and some white cells, however also some squamous epithelial cells so this may be contaminant.  Culture is initiated.  She is feeling markedly better with just some Tylenol.  At this time she will be discharged home and will just continue Tylenol and ibuprofen as needed.  If however she feels worse or is developing fevers I did write a prescription for some Keflex that she could take for bladder infection.  She will receive a phone call in a couple of days time if culture is positive.  Can always return here if worse    New Prescriptions    CEPHALEXIN  (KEFLEX) 500 MG CAPSULE    Take 1 capsule (500 mg) by mouth 3 times daily for 7 days       Final diagnoses:   Flank pain       1/28/2020   [facility]     Marcos Park MD  01/28/20 0210

## 2020-01-29 LAB
BACTERIA SPEC CULT: NORMAL
SPECIMEN SOURCE: NORMAL

## 2020-01-29 NOTE — RESULT ENCOUNTER NOTE
Final urine culture report is NEGATIVE per [facility] Lab Result protocol.    If NEGATIVE result, no change in treatment, per [facility] ED Lab Result protocol.

## 2020-02-21 ENCOUNTER — PRENATAL OFFICE VISIT (OUTPATIENT)
Dept: OBGYN | Facility: OTHER | Age: 25
End: 2020-02-21
Attending: OBSTETRICS & GYNECOLOGY
Payer: COMMERCIAL

## 2020-02-21 VITALS
WEIGHT: 245 LBS | DIASTOLIC BLOOD PRESSURE: 80 MMHG | SYSTOLIC BLOOD PRESSURE: 120 MMHG | OXYGEN SATURATION: 99 % | RESPIRATION RATE: 18 BRPM | BODY MASS INDEX: 38.37 KG/M2 | HEART RATE: 97 BPM

## 2020-02-21 DIAGNOSIS — Z12.4 SCREENING FOR MALIGNANT NEOPLASM OF CERVIX: ICD-10-CM

## 2020-02-21 DIAGNOSIS — F41.1 GENERALIZED ANXIETY DISORDER: ICD-10-CM

## 2020-02-21 PROCEDURE — 88142 CYTOPATH C/V THIN LAYER: CPT | Performed by: OBSTETRICS & GYNECOLOGY

## 2020-02-21 PROCEDURE — G0123 SCREEN CERV/VAG THIN LAYER: HCPCS | Performed by: OBSTETRICS & GYNECOLOGY

## 2020-02-21 PROCEDURE — 99207 ZZC POST-PARTUM 6 WK VISIT - GICH ONLY: CPT | Performed by: OBSTETRICS & GYNECOLOGY

## 2020-02-21 PROCEDURE — G0463 HOSPITAL OUTPT CLINIC VISIT: HCPCS

## 2020-02-21 RX ORDER — NORELGESTROMIN AND ETHINYL ESTRADIOL 35; 150 UG/MG; UG/MG
PATCH TRANSDERMAL
Qty: 9 PATCH | Refills: 3 | Status: SHIPPED | OUTPATIENT
Start: 2020-02-21 | End: 2021-11-10

## 2020-02-21 RX ORDER — BUPROPION HYDROCHLORIDE 100 MG/1
100 TABLET, EXTENDED RELEASE ORAL 2 TIMES DAILY
Qty: 180 TABLET | Refills: 1 | Status: SHIPPED | OUTPATIENT
Start: 2020-02-21 | End: 2021-12-09

## 2020-02-21 ASSESSMENT — PAIN SCALES - GENERAL: PAINLEVEL: NO PAIN (0)

## 2020-02-21 NOTE — PROGRESS NOTES
Alize is here for postpartum visit.  Doing excellent.  Weaned a couple of weeks ago.  Would like to go back on the Ortho Evra patch.  Contemplating another pregnancy within the year.  Needs a Pap smear.  Only other concern is that of just generalized mild anxiety.  Longstanding.  In the past we had talked about using an SSRI and she would like to start at this time.  No serious concerns regarding it.    Physical exam:  Patient alert orientated x3  Vitals as listed  [place] 2  Abdomen soft benign  Pelvic external vagina cervix clean Pap obtained  Uterus well involuted    Assessment:  1.  Normal postpartum visit  2.  Mild generalized anxiety, desires trial of SSRI  3.  Contraceptive management    Plan:  Pap pending.  Wellbutrin 100 mg twice a day.  Side effects discussed.  Ortho Evra to be started at this time.

## 2020-02-21 NOTE — NURSING NOTE
"Chief Complaint   Patient presents with     Postpartum Care     6 week follow-up 01/09/2020   Patient presents to clinic for postpartum care.  She states she  had heavy bleeding 02/11-02/14 from 9 am to 12 that would subside for the rest of the day. Patient would like to discuss the patch for birth control. She would also like to discuss anxiety medication.    Initial LMP 04/04/2019 (Exact Date)  Estimated body mass index is 39.47 kg/m  as calculated from the following:    Height as of 1/28/20: 1.702 m (5' 7\").    Weight as of 1/28/20: 114.3 kg (252 lb).  Medication Reconciliation: complete    Chinyere Woodall LPN  "

## 2020-02-26 LAB
COPATH REPORT: NORMAL
PAP: NORMAL

## 2020-05-20 ENCOUNTER — MEDICAL CORRESPONDENCE (OUTPATIENT)
Dept: HEALTH INFORMATION MANAGEMENT | Facility: OTHER | Age: 25
End: 2020-05-20

## 2020-08-10 ENCOUNTER — MEDICAL CORRESPONDENCE (OUTPATIENT)
Dept: HEALTH INFORMATION MANAGEMENT | Facility: OTHER | Age: 25
End: 2020-08-10

## 2020-09-03 ENCOUNTER — TELEPHONE (OUTPATIENT)
Dept: OBGYN | Facility: OTHER | Age: 25
End: 2020-09-03

## 2020-09-03 DIAGNOSIS — N96 HISTORY OF RECURRENT MISCARRIAGES: Primary | ICD-10-CM

## 2020-09-03 NOTE — TELEPHONE ENCOUNTER
Patient reports that she is newly pregnant, but the line was faint. Last pregnancy she was on Prometrium 200 mg BID and would like to start this again. Patient is agreeable to quant hcg and progesterone tomorrow. If pregnant, she will then need progesterone sent to [facility].    Patient transferred to [facility].    Deborah Sarmiento RN...................9/3/2020 1:26 PM

## 2020-09-03 NOTE — TELEPHONE ENCOUNTER
Please call medication needed into [facility]. Patient would  like a call when it has been sent over.Thanks,Marian Johnson on 9/3/2020 at 1:06 PM

## 2020-09-04 DIAGNOSIS — N96 HISTORY OF RECURRENT MISCARRIAGES: ICD-10-CM

## 2020-09-04 LAB
B-HCG SERPL-ACNC: <1 IU/L
PROGEST SERPL-MCNC: 0.2 NG/ML

## 2020-09-04 PROCEDURE — 84144 ASSAY OF PROGESTERONE: CPT | Mod: ZL | Performed by: OBSTETRICS & GYNECOLOGY

## 2020-09-04 PROCEDURE — 36415 COLL VENOUS BLD VENIPUNCTURE: CPT | Mod: ZL | Performed by: OBSTETRICS & GYNECOLOGY

## 2020-09-04 PROCEDURE — 84702 CHORIONIC GONADOTROPIN TEST: CPT | Mod: ZL | Performed by: OBSTETRICS & GYNECOLOGY

## 2020-09-08 NOTE — TELEPHONE ENCOUNTER
Patient is not pregnant. She reviewed her results via [employer].    Deborah Sarmiento RN...................9/8/2020 4:03 PM

## 2020-10-06 ENCOUNTER — MYC MEDICAL ADVICE (OUTPATIENT)
Dept: OBGYN | Facility: OTHER | Age: 25
End: 2020-10-06

## 2021-01-03 ENCOUNTER — HEALTH MAINTENANCE LETTER (OUTPATIENT)
Age: 26
End: 2021-01-03

## 2021-04-25 ENCOUNTER — HEALTH MAINTENANCE LETTER (OUTPATIENT)
Age: 26
End: 2021-04-25

## 2021-10-09 ENCOUNTER — HEALTH MAINTENANCE LETTER (OUTPATIENT)
Age: 26
End: 2021-10-09

## 2021-11-09 ENCOUNTER — TELEPHONE (OUTPATIENT)
Dept: OBGYN | Facility: OTHER | Age: 26
End: 2021-11-09

## 2021-11-09 ENCOUNTER — LAB (OUTPATIENT)
Dept: LAB | Facility: OTHER | Age: 26
End: 2021-11-09
Attending: OBSTETRICS & GYNECOLOGY
Payer: COMMERCIAL

## 2021-11-09 DIAGNOSIS — Z32.01 PREGNANCY TEST POSITIVE: Primary | ICD-10-CM

## 2021-11-09 DIAGNOSIS — Z32.01 PREGNANCY TEST POSITIVE: ICD-10-CM

## 2021-11-09 DIAGNOSIS — N96 HISTORY OF RECURRENT MISCARRIAGES: ICD-10-CM

## 2021-11-09 LAB
B-HCG SERPL-ACNC: 260 IU/L
PROGEST SERPL-MCNC: 4.8 NG/ML

## 2021-11-09 PROCEDURE — 84144 ASSAY OF PROGESTERONE: CPT | Mod: ZL

## 2021-11-09 PROCEDURE — 36415 COLL VENOUS BLD VENIPUNCTURE: CPT | Mod: ZL

## 2021-11-09 PROCEDURE — 84702 CHORIONIC GONADOTROPIN TEST: CPT | Mod: ZL

## 2021-11-09 RX ORDER — PROGESTERONE 200 MG/1
CAPSULE ORAL
Qty: 120 CAPSULE | Refills: 0 | Status: SHIPPED | OUTPATIENT
Start: 2021-11-09 | End: 2022-01-06

## 2021-11-09 NOTE — TELEPHONE ENCOUNTER
Alize called and said that she had a positive pregnancy test.  She is not sure how far along she is, but is nervous due to frequent miscarriages about 6-8 weeks.  She is wondering if she should see someone right away?  Julienne Monsalve on 11/9/2021 at 8:43 AM

## 2021-11-09 NOTE — TELEPHONE ENCOUNTER
Patient states that she has irregular periods but her LMP is 9/26/21. She has had 3 miscarriages in the past and would like to get confirmatory labs today. She is also wanting to know if she should start Progesterone and baby aspirin if she is pregnant due to her hx of miscarriages.     Per Dr. River patient will start Prometrium 200mg caps place two caps vaginally BID. Prescription sent to [facility] pharmacy. Patient to start baby aspirin at 12 weeks.     Jose Fabian RN, BSN  ....................  11/9/2021   9:07 AM

## 2021-11-10 ENCOUNTER — ALLIED HEALTH/NURSE VISIT (OUTPATIENT)
Dept: OBGYN | Facility: OTHER | Age: 26
End: 2021-11-10
Attending: OBSTETRICS & GYNECOLOGY
Payer: COMMERCIAL

## 2021-11-10 VITALS — BODY MASS INDEX: 39.1 KG/M2 | HEIGHT: 68 IN | WEIGHT: 258 LBS

## 2021-11-10 DIAGNOSIS — Z32.01 PREGNANCY TEST POSITIVE: Primary | ICD-10-CM

## 2021-11-10 PROCEDURE — 99207 PR OB VISIT-NO CHARGE - GICH ONLY: CPT

## 2021-11-10 ASSESSMENT — PATIENT HEALTH QUESTIONNAIRE - PHQ9
SUM OF ALL RESPONSES TO PHQ QUESTIONS 1-9: 0
10. IF YOU CHECKED OFF ANY PROBLEMS, HOW DIFFICULT HAVE THESE PROBLEMS MADE IT FOR YOU TO DO YOUR WORK, TAKE CARE OF THINGS AT HOME, OR GET ALONG WITH OTHER PEOPLE: NOT DIFFICULT AT ALL
SUM OF ALL RESPONSES TO PHQ QUESTIONS 1-9: 0

## 2021-11-10 ASSESSMENT — MIFFLIN-ST. JEOR: SCORE: 1960.84

## 2021-11-10 NOTE — PROGRESS NOTES
HPI:    This is a 26 year old female patient,  who presents today for OB Intake visit. Patient reports positive pregnancy test at home.     Obstetrical history and OB Questionnaire updated to the best of this nurse's ability based on patient report. PHQ-9 depression screening and routine Domestic Abuse screening completed. All immediate questions and concerns answered.    FOOD SECURITY SCREENING QUESTIONS  Hunger Vital Signs:  Within the past 12 months we worried whether our food would run out before we got money to buy more. Never  Within the past 12 months the food we bought just didn't last and we didn't have money to get more. Never    Last menstrual period is reported as Patient's last menstrual period was 2021. LINN based on LMP is Estimated Date of Delivery: Jul 3, 2022.  Her cycles are irregular.  Her last menstrual period was normal.   Since her LMP, she has experienced  none.       OBSTETRIC HISTORY:    OB History    Para Term  AB Living   5 1 1 0 3 1   SAB IAB Ectopic Multiple Live Births   3 0 0 0 1      # Outcome Date GA Lbr Kip/2nd Weight Sex Delivery Anes PTL Lv   5 Current            4 Term 20 40w0d 01:15 / 00:47 3.657 kg (8 lb 1 oz) M Vag-Spont EPI N ROSY      Name: JUAN M BENNETT-LOGAN      Apgar1: 9  Apgar5: 9   3 SAB 18     SAB   FD   2 SAB      SAB   FD   1 SAB      SAB   FD       Age of first pregnancy: 20  Previous OB Provider: DARIANA  Previous Delivering Clinic: [facility]  Release of Records: NA    Current delivery plan: GICH  Preferred OB Provider: PHILOMENA  Current Primary Care Provider: None  Pediatrician: [facility]    Additional History: none    Have you travelled during the pregnancy?No  Have your sexual partner(s) travelled during the pregnancy?No      HISTORY:   Planned Pregnancy: No- not prevented   Marital Status:   Occupation: SAHM  Living in Household: Spouse and Children    Father of the baby i involved.   Family and father of baby is supportive of  current pregnancy.  Past Medical History of Father of Baby:Hypertension    Past History:  Her past medical history History reviewed. No pertinent past medical history..      Her past surgical history:   Past Surgical History:   Procedure Laterality Date     DILATION AND CURETTAGE SUCTION N/A 2019    Procedure: DILATION AND CURETTAGE SUCTION;  Surgeon: Cody Jenkins MD;  Location: [facility] OR       She has a history of  recurrent miscarriages    Since her LMP she admits to the use of alcohol none since finding out .    Pap smear history:   Last 3 Pap Results:   PAP (no units)   Date Value   2020 NIL       STD/STI history: No STD history    STD/STI symptoms: no noticeable symptoms     Past medical, surgical, social and family history were reviewed and updated in EPIC.    Medications reviewed by this nurse. Current medication list:  Current Outpatient Medications   Medication Sig Dispense Refill     Prenatal MV-Min-Fe Fum-FA-DHA (PRENATAL 1 PO)        progesterone (PROMETRIUM) 200 MG capsule Two capsules vaginally two times a day 120 capsule 0     buPROPion 100 MG PO 12 hr tablet Take 1 tablet (100 mg) by mouth 2 times daily (Patient not taking: Reported on 11/10/2021) 180 tablet 1     The following medications were recommended to be discontinued due to Pregnancy Category D status: NA  Patient informed to contact her primary care provider as soon as possible to discuss a safer alternative.    Risk factors:  Moderate and moderately severe risks (consult with OB/Gyn)  Previous fetal or  demise: No  History of  delivery: No  History of heart disease Class I: No  Severe anemia, unresponsive to iron therapy: No  Pelvic mass or neoplasm: No  Previous : No  Hyper/hypothyroidism: No  History of postpartum hemorrhage requiring transfusion:No  History of Placenta Accreta: No    High Risk (Pregnancy managed by OB/Gyn)  Multiple pregnancy: No  Pre-gestational diabetes: No  Chronic Hypertension:  No  Renal Failure: No  Heart disease, class II or greater: No  Rh Isoimmunization: No  Chronic active hepatitis: No  Convulsive disorder, poorly controlled: No  Isoimmune thrombocytopenia: No  Pre-term premature rupture of membranes: No  Lupus or other autoimmune disorder: No  Human Immunodeficiency Virus: No      ASSESSMENT/PLAN:       ICD-10-CM    1. Pregnancy test positive  Z32.01        26 year old , 6w3d of pregnancy with LINN of 7/3/2022, by Last Menstrual Period    Urine pregnancy test was completed today and results are noted above.    Per standing orders and scope of practice of this nurse, patient will have the following orders placed and completed prior to initial OB visit with the appropriate provider:    --early ultrasound for dating and viability ordered for 6+ weeks gestation based on LMP    --Quantitative Beta HCG and progesterone monitoring if indicated    - Given strict ectopic return precautions    Counseling given:     - Recommended weight gain for pregnancy: < 15 lbs.   BMI < 18.5  28-40 lbs   18.5 - 24.9 25-35   25 - 29.9 15-25   > 30  < 15       PLAN/PATIENT INSTRUCTIONS:    Normal exercise.  Normal sexual activity.  Prenatal vitamins.  Anticipated weight gain.    follow-up appointment in 4-6 weeks tentatively with Dr. QUACH for pre-kimber care and take multivitamin or pre- vitamins    Yue Ruiz RN.................................................. 11/10/2021 1:38 PM  Answers for HPI/ROS submitted by the patient on 11/10/2021  If you checked off any problems, how difficult have these problems made it for you to do your work, take care of things at home, or get along with other people?: Not difficult at all  PHQ9 TOTAL SCORE: 0

## 2021-11-11 ENCOUNTER — LAB (OUTPATIENT)
Dept: LAB | Facility: OTHER | Age: 26
End: 2021-11-11
Attending: OBSTETRICS & GYNECOLOGY
Payer: COMMERCIAL

## 2021-11-11 DIAGNOSIS — N96 HISTORY OF RECURRENT MISCARRIAGES: ICD-10-CM

## 2021-11-11 DIAGNOSIS — Z32.01 PREGNANCY TEST POSITIVE: ICD-10-CM

## 2021-11-11 LAB — B-HCG SERPL-ACNC: 675 IU/L

## 2021-11-11 PROCEDURE — 84702 CHORIONIC GONADOTROPIN TEST: CPT | Mod: ZL

## 2021-11-11 PROCEDURE — 36415 COLL VENOUS BLD VENIPUNCTURE: CPT | Mod: ZL

## 2021-11-11 ASSESSMENT — PATIENT HEALTH QUESTIONNAIRE - PHQ9: SUM OF ALL RESPONSES TO PHQ QUESTIONS 1-9: 0

## 2021-12-09 ENCOUNTER — PRENATAL OFFICE VISIT (OUTPATIENT)
Dept: OBGYN | Facility: OTHER | Age: 26
End: 2021-12-09
Attending: OBSTETRICS & GYNECOLOGY
Payer: COMMERCIAL

## 2021-12-09 VITALS
DIASTOLIC BLOOD PRESSURE: 70 MMHG | HEART RATE: 76 BPM | WEIGHT: 255 LBS | SYSTOLIC BLOOD PRESSURE: 122 MMHG | BODY MASS INDEX: 39.35 KG/M2

## 2021-12-09 DIAGNOSIS — N96 HISTORY OF RECURRENT MISCARRIAGES: ICD-10-CM

## 2021-12-09 DIAGNOSIS — Z34.91 ENCOUNTER FOR SUPERVISION OF NORMAL PREGNANCY IN FIRST TRIMESTER, UNSPECIFIED GRAVIDITY: Primary | ICD-10-CM

## 2021-12-09 LAB
C TRACH DNA SPEC QL PROBE+SIG AMP: NEGATIVE
N GONORRHOEA DNA SPEC QL NAA+PROBE: NEGATIVE

## 2021-12-09 PROCEDURE — 99207 PR OB VISIT-NO CHARGE - GICH ONLY: CPT | Performed by: OBSTETRICS & GYNECOLOGY

## 2021-12-09 PROCEDURE — 87086 URINE CULTURE/COLONY COUNT: CPT | Mod: ZL | Performed by: OBSTETRICS & GYNECOLOGY

## 2021-12-09 PROCEDURE — 87491 CHLMYD TRACH DNA AMP PROBE: CPT | Mod: ZL | Performed by: OBSTETRICS & GYNECOLOGY

## 2021-12-09 PROCEDURE — 76801 OB US < 14 WKS SINGLE FETUS: CPT | Performed by: OBSTETRICS & GYNECOLOGY

## 2021-12-09 RX ORDER — PROGESTERONE 200 MG/1
CAPSULE ORAL
Qty: 56 CAPSULE | Refills: 0 | Status: SHIPPED | OUTPATIENT
Start: 2021-12-09 | End: 2022-01-06

## 2021-12-09 RX ORDER — ASPIRIN 81 MG/1
81 TABLET, CHEWABLE ORAL DAILY
Status: ON HOLD | COMMUNITY
End: 2022-07-13

## 2021-12-09 ASSESSMENT — PAIN SCALES - GENERAL: PAINLEVEL: NO PAIN (0)

## 2021-12-09 NOTE — PROGRESS NOTES
CC: New OB visit  HPI:  Logan Fountain is  at 8w5d based on first trimester US 2021.  She notes issues of nausea    OB History    Para Term  AB Living   5 1 1 0 3 1   SAB IAB Ectopic Multiple Live Births   3 0 0 0 1      # Outcome Date GA Lbr Kip/2nd Weight Sex Delivery Anes PTL Lv   5 Current            4 Term 20 40w0d 01:15 / 00:47 3.657 kg (8 lb 1 oz) M Vag-Spont EPI N ROSY      Name: SABRINA,MALE-LOGAN      Apgar1: 9  Apgar5: 9   3 SAB 18     SAB   FD   2 SAB      SAB   FD   1 SAB      SAB   FD     STI: (denies HSV, Hep C, Hep B, HIV, Syphilis, Chlamydia, Gonorrhea)  Last pap smear:   Lab Results   Component Value Date    PAP NIL 2020     Chickenpox history: immune  History reviewed. No pertinent past medical history.   has a past surgical history that includes Dilation and curettage suction (N/A, 2019).    Social History     Tobacco Use     Smoking status: Current Every Day Smoker     Packs/day: 0.50     Types: Cigarettes     Smokeless tobacco: Never Used     Tobacco comment: 15 a day   Vaping Use     Vaping Use: Never used   Substance Use Topics     Alcohol use: No     Comment: Rarely      Drug use: Never     History reviewed. No pertinent family history.      Current Outpatient Medications   Medication     aspirin (ASA) 81 MG chewable tablet     Prenatal MV-Min-Fe Fum-FA-DHA (PRENATAL 1 PO)     progesterone (PROMETRIUM) 200 MG capsule     No current facility-administered medications for this visit.     Allergies   Allergen Reactions     Azithromycin Rash     Gin [Alcohol] Swelling     Eye swelling and itching      Immunization History   Administered Date(s) Administered     DTAP (<7y) 1999     DTaP, Unspecified 1997, 1998, 1998, 1999, 10/14/2009, 2016     FLU 6-35 months 10/09/2013     HPV9 2015, 2016, 10/24/2017     Hep B, Peds or Adolescent 1997, 1997, 1998     HepB, Unspecified  1997, 1997, 04/08/1998     Historical DTP/aP 1997, 02/02/1998, 04/08/1998     Influenza (IIV3) PF 02/16/2007, 11/18/2008, 10/13/2010, 10/12/2011, 10/10/2012, 11/30/2015     Influenza Intranasal Vaccine 10/14/2009     Influenza Not Indicated - By Hx 02/16/2007, 11/18/2008     Influenza Quad, Recombinant, pf(RIV4) (Flublok) 11/21/2014, 10/24/2017, 10/16/2018     Influenza Vaccine IM > 6 months Valent IIV4 (Alfuria,Fluzone) 11/21/2014, 11/30/2015, 10/24/2017, 10/16/2018, 10/17/2019     MMR 10/13/1998, 10/14/2009     Meningococcal (Menactra ) 05/19/2010, 09/29/2014     Pedvax-hib 1997, 02/02/1998, 04/08/1998, 10/13/1998     Poliovirus, inactivated (IPV) 1997, 02/02/1998, 10/13/1998     Rabavert 09/12/2016, 09/19/2016     Rabies, Unspecified 09/12/2016, 09/15/2016, 09/19/2016     TDAP Vaccine (Adacel) 10/14/2009     TDAP Vaccine (Boostrix) 10/17/2019     Varicella 04/28/1999, 10/14/2009       REVIEW OF SYSTEMS  General: negative  Resp: No shortness of breath, dyspnea on exertion, cough, or hemoptysis  CV: negative  GI: negative  : negative  Musculoskeletal: negative  Neurologic: negative  Psychiatric: negative  Hematologic: negative  Endocrine: negative    EXAM: /70   Pulse 76   Wt 115.7 kg (255 lb)   LMP 09/26/2021   BMI 39.35 kg/m    Gen: NAD  CV: RRR with normal S1, S2, no GRM  Resp: CTA Bilaterally  Neck: supple without thyromegaly mass or noduels  Extremities: No TTP, no deformity  Neuro: CN II-XII intact grossly, moves all extremities  Psych: normal affect and mentation.    Recent Results (from the past 24 hour(s))   US OB < 14 Weeks ( OB/GYN ONLY)    Narrative    Early OB US    Indication: uncertain dates and viability    The [employer] Affiniti 50 W Ultrasound machine was used with the C6-2 probe.  With use of realtime 2-D and still images a viable marcus intrauterine gestation is identified.    CRL: 2.04 cm at 8 weeks 5 days with EDC by US of 7/16/2022  Yolk Sac: 5.1  mm    Fetal cardiac activity: 174 bpm by use of m-mode doppler    Right ovary: small cl cyst  Left ovary:  normal  Uterus: anteverted and normal shape and size    Cul-de-sac: Normal without free fluid  Cervix: Normal without evidence of funneling    I/P: Viable marcus intrauterine pregnancy at 8 weeks 5 days with US EDC of 7/16/2022 which is not concordant with menstrual date.           I/P  Discussed safety, nutrition, screening for cystic fibrosis, spina bifida, spinal muscular atrophy, quad screen, cffDNA screening as appropriate.  F/U scheduled, discussed call schedule rotation.  Return visit in 1 month     Pregnancy risk factors include:  History of recurrent pregnancy loss  Body mass index is 39.35 kg/m .      Arthur River MD FACOG  9:53 AM 12/9/2021

## 2021-12-09 NOTE — NURSING NOTE
"Chief Complaint   Patient presents with     Prenatal Care     ob px       Initial /70   Pulse 76   Wt 115.7 kg (255 lb)   LMP 09/26/2021   BMI 39.35 kg/m   Estimated body mass index is 39.35 kg/m  as calculated from the following:    Height as of 11/10/21: 1.715 m (5' 7.5\").    Weight as of this encounter: 115.7 kg (255 lb).  Medication Reconciliation: complete    FOOD SECURITY SCREENING QUESTIONS  Hunger Vital Signs:  Within the past 12 months we worried whether our food would run out before we got money to buy more. Never  Within the past 12 months the food we bought just didn't last and we didn't have money to get more. Never  Karime Dockery LPN 12/9/2021 9:23 AM             Karime Dockery LPN  "

## 2021-12-10 LAB — BACTERIA UR CULT: NORMAL

## 2021-12-14 ENCOUNTER — MYC MEDICAL ADVICE (OUTPATIENT)
Dept: OBGYN | Facility: OTHER | Age: 26
End: 2021-12-14

## 2021-12-14 ENCOUNTER — LAB (OUTPATIENT)
Dept: LAB | Facility: OTHER | Age: 26
End: 2021-12-14
Attending: OBSTETRICS & GYNECOLOGY
Payer: COMMERCIAL

## 2021-12-14 DIAGNOSIS — Z34.91 ENCOUNTER FOR SUPERVISION OF NORMAL PREGNANCY IN FIRST TRIMESTER, UNSPECIFIED GRAVIDITY: Primary | ICD-10-CM

## 2021-12-14 DIAGNOSIS — R30.0 DYSURIA: ICD-10-CM

## 2021-12-14 DIAGNOSIS — R30.0 DYSURIA: Primary | ICD-10-CM

## 2021-12-14 LAB
ALBUMIN UR-MCNC: NEGATIVE MG/DL
APPEARANCE UR: CLEAR
BACTERIA #/AREA URNS HPF: ABNORMAL /HPF
BILIRUB UR QL STRIP: NEGATIVE
COLOR UR AUTO: ABNORMAL
GLUCOSE UR STRIP-MCNC: NEGATIVE MG/DL
HGB UR QL STRIP: NEGATIVE
KETONES UR STRIP-MCNC: NEGATIVE MG/DL
LEUKOCYTE ESTERASE UR QL STRIP: NEGATIVE
MUCOUS THREADS #/AREA URNS LPF: PRESENT /LPF
NITRATE UR QL: NEGATIVE
PH UR STRIP: 6 [PH] (ref 5–9)
RBC URINE: 1 /HPF
SP GR UR STRIP: 1.01 (ref 1–1.03)
UROBILINOGEN UR STRIP-MCNC: NORMAL MG/DL
WBC URINE: 1 /HPF

## 2021-12-14 PROCEDURE — 81001 URINALYSIS AUTO W/SCOPE: CPT | Mod: ZL | Performed by: OBSTETRICS & GYNECOLOGY

## 2021-12-23 ENCOUNTER — LAB (OUTPATIENT)
Dept: LAB | Facility: OTHER | Age: 26
End: 2021-12-23
Attending: OBSTETRICS & GYNECOLOGY
Payer: COMMERCIAL

## 2021-12-23 DIAGNOSIS — Z34.91 ENCOUNTER FOR SUPERVISION OF NORMAL PREGNANCY IN FIRST TRIMESTER, UNSPECIFIED GRAVIDITY: ICD-10-CM

## 2021-12-23 DIAGNOSIS — R30.0 DYSURIA: ICD-10-CM

## 2021-12-23 LAB
ABO/RH(D): NORMAL
ANTIBODY SCREEN: NEGATIVE
BASOPHILS # BLD AUTO: 0 10E3/UL (ref 0–0.2)
BASOPHILS NFR BLD AUTO: 0 %
EOSINOPHIL # BLD AUTO: 0.1 10E3/UL (ref 0–0.7)
EOSINOPHIL NFR BLD AUTO: 1 %
ERYTHROCYTE [DISTWIDTH] IN BLOOD BY AUTOMATED COUNT: 12.8 % (ref 10–15)
HCT VFR BLD AUTO: 39 % (ref 35–47)
HGB BLD-MCNC: 13.4 G/DL (ref 11.7–15.7)
IMM GRANULOCYTES # BLD: 0 10E3/UL
IMM GRANULOCYTES NFR BLD: 0 %
LYMPHOCYTES # BLD AUTO: 1.8 10E3/UL (ref 0.8–5.3)
LYMPHOCYTES NFR BLD AUTO: 22 %
MCH RBC QN AUTO: 28.7 PG (ref 26.5–33)
MCHC RBC AUTO-ENTMCNC: 34.4 G/DL (ref 31.5–36.5)
MCV RBC AUTO: 84 FL (ref 78–100)
MONOCYTES # BLD AUTO: 0.5 10E3/UL (ref 0–1.3)
MONOCYTES NFR BLD AUTO: 6 %
NEUTROPHILS # BLD AUTO: 5.8 10E3/UL (ref 1.6–8.3)
NEUTROPHILS NFR BLD AUTO: 71 %
NRBC # BLD AUTO: 0 10E3/UL
NRBC BLD AUTO-RTO: 0 /100
PLATELET # BLD AUTO: 248 10E3/UL (ref 150–450)
RBC # BLD AUTO: 4.67 10E6/UL (ref 3.8–5.2)
SPECIMEN EXPIRATION DATE: NORMAL
WBC # BLD AUTO: 8.2 10E3/UL (ref 4–11)

## 2021-12-23 PROCEDURE — 87340 HEPATITIS B SURFACE AG IA: CPT | Mod: ZL

## 2021-12-23 PROCEDURE — 86780 TREPONEMA PALLIDUM: CPT | Mod: ZL

## 2021-12-23 PROCEDURE — 86900 BLOOD TYPING SEROLOGIC ABO: CPT | Mod: ZL

## 2021-12-23 PROCEDURE — 36415 COLL VENOUS BLD VENIPUNCTURE: CPT | Mod: ZL

## 2021-12-23 PROCEDURE — 87389 HIV-1 AG W/HIV-1&-2 AB AG IA: CPT | Mod: ZL

## 2021-12-23 PROCEDURE — 86762 RUBELLA ANTIBODY: CPT | Mod: ZL

## 2021-12-23 PROCEDURE — 85025 COMPLETE CBC W/AUTO DIFF WBC: CPT | Mod: ZL

## 2021-12-24 LAB
HBV SURFACE AG SERPL QL IA: NONREACTIVE
HIV 1+2 AB+HIV1 P24 AG SERPL QL IA: NONREACTIVE
RUBV IGG SERPL QL IA: 19.3 INDEX
RUBV IGG SERPL QL IA: POSITIVE
T PALLIDUM AB SER QL: NONREACTIVE

## 2022-01-06 ENCOUNTER — PRENATAL OFFICE VISIT (OUTPATIENT)
Dept: OBGYN | Facility: OTHER | Age: 27
End: 2022-01-06
Attending: OBSTETRICS & GYNECOLOGY
Payer: COMMERCIAL

## 2022-01-06 VITALS
DIASTOLIC BLOOD PRESSURE: 68 MMHG | SYSTOLIC BLOOD PRESSURE: 112 MMHG | BODY MASS INDEX: 38.73 KG/M2 | HEART RATE: 90 BPM | WEIGHT: 251 LBS

## 2022-01-06 DIAGNOSIS — N96 HISTORY OF RECURRENT MISCARRIAGES: Primary | ICD-10-CM

## 2022-01-06 PROCEDURE — 99207 PR OB VISIT-NO CHARGE - GICH ONLY: CPT | Performed by: OBSTETRICS & GYNECOLOGY

## 2022-01-06 NOTE — PROGRESS NOTES
CC: Recheck OB visit at 12w5d    HPI: Logan Fountain presents for a routine OB visit now at 12w5d  Concerns: nausea, controlling it.  Patient denies contractions, vaginal bleeding or leaking of fluid.    OB History    Para Term  AB Living   5 1 1 0 3 1   SAB IAB Ectopic Multiple Live Births   3 0 0 0 1      # Outcome Date GA Lbr Kip/2nd Weight Sex Delivery Anes PTL Lv   5 Current            4 Term 20 40w0d 01:15 / 00:47 3.657 kg (8 lb 1 oz) M Vag-Spont EPI N ROSY      Name: UJAN M FOUNTAIN-LOGAN      Apgar1: 9  Apgar5: 9   3 SAB 18     SAB   FD   2 SAB      SAB   FD   1 SAB      SAB   FD     Current Outpatient Medications   Medication     aspirin (ASA) 81 MG chewable tablet     Prenatal MV-Min-Fe Fum-FA-DHA (PRENATAL 1 PO)     No current facility-administered medications for this visit.       O: /68   Pulse 90   Wt 113.9 kg (251 lb)   LMP 2021   BMI 38.73 kg/m    Body mass index is 38.73 kg/m .  See OB flow sheet  EXAM:  NAD    FHT:155 bpm    No results found for any visits on 22.    A/P: No diagnosis found.    Recheck in 4 weeks      Problem List:      Pregnancy risk factors include:  History of recurrent pregnancy loss  Body mass index is 39.35 kg/m .    Arthur River MD FACOG  9:32 AM 2022

## 2022-01-06 NOTE — NURSING NOTE
Pt presents to clinic today for prenatal care 12w5d.       Medication Reconciliation: complete  Analisa Lux LPN

## 2022-02-10 ENCOUNTER — PRENATAL OFFICE VISIT (OUTPATIENT)
Dept: OBGYN | Facility: OTHER | Age: 27
End: 2022-02-10
Attending: OBSTETRICS & GYNECOLOGY
Payer: COMMERCIAL

## 2022-02-10 VITALS
SYSTOLIC BLOOD PRESSURE: 121 MMHG | BODY MASS INDEX: 38.89 KG/M2 | DIASTOLIC BLOOD PRESSURE: 70 MMHG | WEIGHT: 252 LBS | HEART RATE: 76 BPM

## 2022-02-10 DIAGNOSIS — Z34.82 NORMAL PREGNANCY IN MULTIGRAVIDA IN SECOND TRIMESTER: Primary | ICD-10-CM

## 2022-02-10 PROCEDURE — 99207 PR OB VISIT-NO CHARGE - GICH ONLY: CPT | Performed by: OBSTETRICS & GYNECOLOGY

## 2022-02-10 ASSESSMENT — PAIN SCALES - GENERAL: PAINLEVEL: NO PAIN (0)

## 2022-02-10 NOTE — NURSING NOTE
"Chief Complaint   Patient presents with     Prenatal Care     17 weeks 5 days       Initial /70   Pulse 76   Wt 114.3 kg (252 lb)   LMP 09/26/2021   BMI 38.89 kg/m   Estimated body mass index is 38.89 kg/m  as calculated from the following:    Height as of 11/10/21: 1.715 m (5' 7.5\").    Weight as of this encounter: 114.3 kg (252 lb).  Medication Reconciliation: complete    FOOD SECURITY SCREENING QUESTIONS  Hunger Vital Signs:  Within the past 12 months we worried whether our food would run out before we got money to buy more. Never  Within the past 12 months the food we bought just didn't last and we didn't have money to get more. Never  Karime Dockery LPN 2/10/2022 10:50 AM             Karime Dockery LPN  "

## 2022-02-10 NOTE — PROGRESS NOTES
CC: Recheck OB visit at 17w5d    HPI: Logan Fountain presents for a routine OB visit now at 17w5d  Concerns: denies bleeding, cramps, no baby movement yet.    OB History    Para Term  AB Living   5 1 1 0 3 1   SAB IAB Ectopic Multiple Live Births   3 0 0 0 1      # Outcome Date GA Lbr Kip/2nd Weight Sex Delivery Anes PTL Lv   5 Current            4 Term 20 40w0d 01:15  00:47 3.657 kg (8 lb 1 oz) M Vag-Spont EPI N ROSY      Name: SABRINA,MALE-LOGAN      Apgar1: 9  Apgar5: 9   3 SAB 18     SAB   FD   2 SAB      SAB   FD   1 SAB      SAB   FD     Current Outpatient Medications   Medication     aspirin (ASA) 81 MG chewable tablet     Prenatal MV-Min-Fe Fum-FA-DHA (PRENATAL 1 PO)     No current facility-administered medications for this visit.       O: /70   Pulse 76   Wt 114.3 kg (252 lb)   LMP 2021   BMI 38.89 kg/m    Body mass index is 38.89 kg/m .  See OB flow sheet  EXAM:  NAD    FHT:135 bpm    No results found for any visits on 02/10/22.    A/P: 17w5d gestation    Recheck in 3 weeks  Fetal Survey next visit.    Problem List:     Pregnancy risk factors include:  History of recurrent pregnancy loss  Body mass index is 39.35 kg/m .    Arthur River MD FACOG  11:07 AM 2/10/2022

## 2022-03-03 ENCOUNTER — PRENATAL OFFICE VISIT (OUTPATIENT)
Dept: OBGYN | Facility: OTHER | Age: 27
End: 2022-03-03
Attending: OBSTETRICS & GYNECOLOGY
Payer: COMMERCIAL

## 2022-03-03 ENCOUNTER — HOSPITAL ENCOUNTER (OUTPATIENT)
Dept: ULTRASOUND IMAGING | Facility: OTHER | Age: 27
End: 2022-03-03
Attending: OBSTETRICS & GYNECOLOGY
Payer: COMMERCIAL

## 2022-03-03 VITALS
BODY MASS INDEX: 39.35 KG/M2 | OXYGEN SATURATION: 98 % | HEART RATE: 84 BPM | WEIGHT: 255 LBS | RESPIRATION RATE: 16 BRPM | DIASTOLIC BLOOD PRESSURE: 70 MMHG | SYSTOLIC BLOOD PRESSURE: 108 MMHG

## 2022-03-03 DIAGNOSIS — Z34.82 NORMAL PREGNANCY IN MULTIGRAVIDA IN SECOND TRIMESTER: ICD-10-CM

## 2022-03-03 DIAGNOSIS — Z34.82 NORMAL PREGNANCY IN MULTIGRAVIDA IN SECOND TRIMESTER: Primary | ICD-10-CM

## 2022-03-03 PROCEDURE — 99207 PR OB VISIT-NO CHARGE - GICH ONLY: CPT | Performed by: OBSTETRICS & GYNECOLOGY

## 2022-03-03 PROCEDURE — 76805 OB US >/= 14 WKS SNGL FETUS: CPT

## 2022-03-03 ASSESSMENT — PAIN SCALES - GENERAL: PAINLEVEL: NO PAIN (0)

## 2022-03-03 NOTE — PROGRESS NOTES
CC: Recheck OB visit at 20w5d    HPI: Logan Fountain presents for a routine OB visit now at 20w5d  Concerns: None  Patient notices normal fetal movement, denies contractions, vaginal bleeding or leaking of fluid.    OB History    Para Term  AB Living   5 1 1 0 3 1   SAB IAB Ectopic Multiple Live Births   3 0 0 0 1      # Outcome Date GA Lbr Kip/2nd Weight Sex Delivery Anes PTL Lv   5 Current            4 Term 20 40w0d 01:15 / 00:47 3.657 kg (8 lb 1 oz) M Vag-Spont EPI N ROSY      Name: JUAN M FOUNTAIN-LOGAN      Apgar1: 9  Apgar5: 9   3 SAB 18     SAB   FD   2 SAB      SAB   FD   1 SAB      SAB   FD     Current Outpatient Medications   Medication     aspirin (ASA) 81 MG chewable tablet     Prenatal MV-Min-Fe Fum-FA-DHA (PRENATAL 1 PO)     No current facility-administered medications for this visit.     O: /70 (BP Location: Right arm, Patient Position: Sitting, Cuff Size: Adult Large)   Pulse 84   Resp 16   Wt 115.7 kg (255 lb)   LMP 2021   SpO2 98%   BMI 39.35 kg/m    Body mass index is 39.35 kg/m .  See OB flow sheet  EXAM:  NAD    Results for orders placed or performed during the hospital encounter of 22   US OB > 14 Weeks     Status: None    Narrative    OB ULTRASOUND REPORT       Clinical:      Anatomic survey  Gestation Number:  1  Presentation:    Breech      Cardiac Activity:   Regular  BPM:  135  Movement:  Yes  Placenta:   Posterior; grade 0      Previa:  No Previa      Cervix:    3.2 cm in length  Amniotic Fluid:    Normal  MARSHA  12.6 cm      Measurements:    BPD  20 weeks 1 day; 24th percentile  HC  21 weeks 4 days; 73rd percentile  AC  20 weeks 6 days; 44th percentile  FL  21 weeks 3 days; 62nd percentile      Gestational age:    By current measurements:  21 weeks 0 days; EDC 2022  By LMP or prior datin weeks 5 days; EDC 2022      Anatomy:    Lateral ventricle (<1 cm)  Unremarkable  Choroid plexus  Unremarkable  Cisterna magna (0.2-1 cm)   Unremarkable  Cerebellum  Unremarkable  Midline falx  Unremarkable  Cavum septum lucidum  Unremarkable  Spine  Unremarkable  Stomach  Unremarkable  Kidneys  Unremarkable  Bladder  Unremarkable  3 vessel cord  Unremarkable    Cord insertion at abdomen  Unremarkable  Cord insertion of placenta  Unremarkable  4 chamber heart  Not ideally visualized  RVOT  Not ideally visualized  LVOT  Not ideally visualized    Anterior abdominal wall  Unremarkable    Diaphragm (heart above/stomach below)  Unremarkable  Profile/face  Unremarkable  Nose/lips  Unremarkable  Upper extremities  Unremarkable  Lower extremities  Unremarkable      Estimated Fetal Weight:    392g  61 % based on  prior ultrasound        Impression    Impression:  1.  Single live intrauterine gestation with estimated age by prior  dating of 20 weeks 5 days. There has been appropriate interval growth.      2.  Somewhat limited evaluation of the fetal heart due to fetal  position and patient body habitus.    ANDREI SANCHEZ MD         SYSTEM ID:  [id number]       A/P: 20w5d gestation    Recheck in 4 weeks  F/u anatomy completion in 4 weeks.    Problem List:     Pregnancy risk factors include:  History of recurrent pregnancy loss  Body mass index is 39.35 kg/m .    Arthur River MD FACOG  9:44 AM 3/3/2022

## 2022-03-03 NOTE — NURSING NOTE
"Patient presents to the clinic for prenatal care.    FOOD SECURITY SCREENING QUESTIONS:    The next two questions are to help us understand your food security.  If you are feeling you need any assistance in this area, we have resources available to support you today.    Hunger Vital Signs:  Within the past 12 months we worried whether our food would run out before we got money to buy more. Never  Within the past 12 months the food we bought just didn't last and we didn't have money to get more. Never    Advance Care Directive on file? no  Advance Care Directive provided to patient? Declined.     Chief Complaint   Patient presents with     Prenatal Care       Initial /70 (BP Location: Right arm, Patient Position: Sitting, Cuff Size: Adult Large)   Pulse 84   Resp 16   Wt 115.7 kg (255 lb)   LMP 09/26/2021   SpO2 98%   BMI 39.35 kg/m   Estimated body mass index is 39.35 kg/m  as calculated from the following:    Height as of 11/10/21: 1.715 m (5' 7.5\").    Weight as of this encounter: 115.7 kg (255 lb).  Medication Reconciliation: complete        Cindy Cormier LPN    "

## 2022-03-31 ENCOUNTER — PRENATAL OFFICE VISIT (OUTPATIENT)
Dept: OBGYN | Facility: OTHER | Age: 27
End: 2022-03-31
Attending: OBSTETRICS & GYNECOLOGY
Payer: COMMERCIAL

## 2022-03-31 ENCOUNTER — HOSPITAL ENCOUNTER (OUTPATIENT)
Dept: ULTRASOUND IMAGING | Facility: OTHER | Age: 27
Discharge: HOME | End: 2022-03-31
Attending: OBSTETRICS & GYNECOLOGY
Payer: COMMERCIAL

## 2022-03-31 VITALS
HEART RATE: 83 BPM | BODY MASS INDEX: 39.66 KG/M2 | WEIGHT: 257 LBS | SYSTOLIC BLOOD PRESSURE: 118 MMHG | DIASTOLIC BLOOD PRESSURE: 70 MMHG

## 2022-03-31 DIAGNOSIS — Z34.82 NORMAL PREGNANCY IN MULTIGRAVIDA IN SECOND TRIMESTER: ICD-10-CM

## 2022-03-31 DIAGNOSIS — N96 HISTORY OF RECURRENT MISCARRIAGES: ICD-10-CM

## 2022-03-31 DIAGNOSIS — Z34.82 NORMAL PREGNANCY IN MULTIGRAVIDA IN SECOND TRIMESTER: Primary | ICD-10-CM

## 2022-03-31 LAB
ERYTHROCYTE [DISTWIDTH] IN BLOOD BY AUTOMATED COUNT: 13.2 % (ref 10–15)
GLUCOSE 1H P 50 G GLC PO SERPL-MCNC: 123 MG/DL (ref 70–129)
HCT VFR BLD AUTO: 33.3 % (ref 35–47)
HGB BLD-MCNC: 11.4 G/DL (ref 11.7–15.7)
MCH RBC QN AUTO: 29.5 PG (ref 26.5–33)
MCHC RBC AUTO-ENTMCNC: 34.2 G/DL (ref 31.5–36.5)
MCV RBC AUTO: 86 FL (ref 78–100)
PLATELET # BLD AUTO: 231 10E3/UL (ref 150–450)
RBC # BLD AUTO: 3.87 10E6/UL (ref 3.8–5.2)
WBC # BLD AUTO: 11.4 10E3/UL (ref 4–11)

## 2022-03-31 PROCEDURE — 99207 PR OB VISIT-NO CHARGE - GICH ONLY: CPT | Performed by: OBSTETRICS & GYNECOLOGY

## 2022-03-31 PROCEDURE — 82950 GLUCOSE TEST: CPT | Mod: ZL | Performed by: OBSTETRICS & GYNECOLOGY

## 2022-03-31 PROCEDURE — 36415 COLL VENOUS BLD VENIPUNCTURE: CPT | Mod: ZL | Performed by: OBSTETRICS & GYNECOLOGY

## 2022-03-31 PROCEDURE — 86780 TREPONEMA PALLIDUM: CPT | Mod: ZL | Performed by: OBSTETRICS & GYNECOLOGY

## 2022-03-31 PROCEDURE — 85027 COMPLETE CBC AUTOMATED: CPT | Mod: ZL | Performed by: OBSTETRICS & GYNECOLOGY

## 2022-03-31 PROCEDURE — 76816 OB US FOLLOW-UP PER FETUS: CPT

## 2022-03-31 ASSESSMENT — PAIN SCALES - GENERAL: PAINLEVEL: NO PAIN (0)

## 2022-03-31 NOTE — NURSING NOTE
"Chief Complaint   Patient presents with     Prenatal Care     24 weeks 5 days       Initial /70   Pulse 83   Wt 116.6 kg (257 lb)   LMP 09/26/2021   BMI 39.66 kg/m   Estimated body mass index is 39.66 kg/m  as calculated from the following:    Height as of 11/10/21: 1.715 m (5' 7.5\").    Weight as of this encounter: 116.6 kg (257 lb).  Medication Reconciliation: complete        Karime Dockery LPN  "

## 2022-03-31 NOTE — PROGRESS NOTES
CC: Recheck OB visit at 24w5d    HPI: Logan Fountain presents for a routine OB visit now at 24w5d  Concerns:   Patient notices normal fetal movement, denies contractions, vaginal bleeding or leaking of fluid.    OB History    Para Term  AB Living   5 1 1 0 3 1   SAB IAB Ectopic Multiple Live Births   3 0 0 0 1      # Outcome Date GA Lbr Kip/2nd Weight Sex Delivery Anes PTL Lv   5 Current            4 Term 20 40w0d 01:15 / 00:47 3.657 kg (8 lb 1 oz) M Vag-Spont EPI N ROSY      Name: JUAN M FOUNTAIN-LOGAN      Apgar1: 9  Apgar5: 9   3 SAB 18     SAB   FD   2 SAB      SAB   FD   1 SAB      SAB   FD     Current Outpatient Medications   Medication     aspirin (ASA) 81 MG chewable tablet     Prenatal MV-Min-Fe Fum-FA-DHA (PRENATAL 1 PO)     No current facility-administered medications for this visit.       O: /70   Pulse 83   Wt 116.6 kg (257 lb)   LMP 2021   BMI 39.66 kg/m    Body mass index is 39.66 kg/m .  See OB flow sheet  EXAM:  NAD  FH:24.5 cm  FHT:140 bpm    No results found for any visits on 22.    A/P: (Z34.82) Normal pregnancy in multigravida in second trimester  (primary encounter diagnosis)  Comment:   Plan:     Recheck in 4 weeks  tdap    Problem List:     Pregnancy risk factors include:  History of recurrent pregnancy loss   x 1  Body mass index is 39.35 kg/m .     Arthur River MD FACOG  9:44 AM 3/31/2022

## 2022-04-01 LAB — T PALLIDUM AB SER QL: NONREACTIVE

## 2022-04-26 ENCOUNTER — PRENATAL OFFICE VISIT (OUTPATIENT)
Dept: OBGYN | Facility: OTHER | Age: 27
End: 2022-04-26
Attending: OBSTETRICS & GYNECOLOGY
Payer: COMMERCIAL

## 2022-04-26 VITALS
WEIGHT: 262.7 LBS | SYSTOLIC BLOOD PRESSURE: 128 MMHG | DIASTOLIC BLOOD PRESSURE: 74 MMHG | OXYGEN SATURATION: 99 % | RESPIRATION RATE: 18 BRPM | HEART RATE: 85 BPM | BODY MASS INDEX: 40.54 KG/M2

## 2022-04-26 DIAGNOSIS — O26.843 SIGNIFICANT DISCREPANCY BETWEEN UTERINE SIZE AND CLINICAL DATES, ANTEPARTUM, THIRD TRIMESTER: Primary | ICD-10-CM

## 2022-04-26 PROCEDURE — 90471 IMMUNIZATION ADMIN: CPT | Performed by: OBSTETRICS & GYNECOLOGY

## 2022-04-26 PROCEDURE — 99207 PR OB VISIT-NO CHARGE - GICH ONLY: CPT | Performed by: OBSTETRICS & GYNECOLOGY

## 2022-04-26 PROCEDURE — 90715 TDAP VACCINE 7 YRS/> IM: CPT | Performed by: OBSTETRICS & GYNECOLOGY

## 2022-04-26 ASSESSMENT — PAIN SCALES - GENERAL: PAINLEVEL: NO PAIN (0)

## 2022-04-26 NOTE — NURSING NOTE
"Chief Complaint   Patient presents with     Prenatal Care     28 3/7wks       Initial /74 (BP Location: Right arm, Patient Position: Sitting, Cuff Size: Adult Regular)   Pulse 85   Resp 18   Wt 119.2 kg (262 lb 11.2 oz)   LMP 09/26/2021   SpO2 99%   BMI 40.54 kg/m   Estimated body mass index is 40.54 kg/m  as calculated from the following:    Height as of 11/10/21: 1.715 m (5' 7.5\").    Weight as of this encounter: 119.2 kg (262 lb 11.2 oz).  Medication Reconciliation: complete    Jenn Stewart LPN    Advance Care Directive reviewed    "

## 2022-04-26 NOTE — PROGRESS NOTES
CC: Recheck OB visit at 28w3d    HPI: Logan Fountain presents for a routine OB visit now at 28w3d  Concerns: None  Patient notices normal fetal movement, denies contractions, vaginal bleeding or leaking of fluid.    OB History    Para Term  AB Living   5 1 1 0 3 1   SAB IAB Ectopic Multiple Live Births   3 0 0 0 1      # Outcome Date GA Lbr Kip/2nd Weight Sex Delivery Anes PTL Lv   5 Current            4 Term 20 40w0d 01:15 / 00:47 3.657 kg (8 lb 1 oz) M Vag-Spont EPI N ROSY      Name: JUAN M FOUNTAIN-LOGAN      Apgar1: 9  Apgar5: 9   3 SAB 18     SAB   FD   2 SAB      SAB   FD   1 SAB      SAB   FD     Current Outpatient Medications   Medication     aspirin (ASA) 81 MG chewable tablet     Prenatal MV-Min-Fe Fum-FA-DHA (PRENATAL 1 PO)     No current facility-administered medications for this visit.         O: /74 (BP Location: Right arm, Patient Position: Sitting, Cuff Size: Adult Regular)   Pulse 85   Resp 18   Wt 119.2 kg (262 lb 11.2 oz)   LMP 2021   SpO2 99%   BMI 40.54 kg/m    Body mass index is 40.54 kg/m .  See OB flow sheet  EXAM:  NAD  FH:32 cm  FHT:130 bpm    No results found for any visits on 22.    A/P: 28w3d    Recheck in 4 weeks    Problem List:     Pregnancy risk factors include:  History of recurrent pregnancy loss   x 1  Body mass index is 39.35 kg/m .    Arthur River MD FACOG  9:42 AM 2022

## 2022-05-19 ENCOUNTER — PRENATAL OFFICE VISIT (OUTPATIENT)
Dept: OBGYN | Facility: OTHER | Age: 27
End: 2022-05-19
Attending: OBSTETRICS & GYNECOLOGY
Payer: COMMERCIAL

## 2022-05-19 ENCOUNTER — HOSPITAL ENCOUNTER (OUTPATIENT)
Dept: ULTRASOUND IMAGING | Facility: OTHER | Age: 27
Discharge: HOME | End: 2022-05-19
Attending: OBSTETRICS & GYNECOLOGY
Payer: COMMERCIAL

## 2022-05-19 VITALS
BODY MASS INDEX: 41.2 KG/M2 | HEART RATE: 87 BPM | DIASTOLIC BLOOD PRESSURE: 72 MMHG | WEIGHT: 267 LBS | SYSTOLIC BLOOD PRESSURE: 118 MMHG

## 2022-05-19 DIAGNOSIS — Z34.90 NORMAL PREGNANCY, ANTEPARTUM: Primary | ICD-10-CM

## 2022-05-19 DIAGNOSIS — O26.843 SIGNIFICANT DISCREPANCY BETWEEN UTERINE SIZE AND CLINICAL DATES, ANTEPARTUM, THIRD TRIMESTER: ICD-10-CM

## 2022-05-19 PROCEDURE — 99207 PR OB VISIT-NO CHARGE - GICH ONLY: CPT | Performed by: OBSTETRICS & GYNECOLOGY

## 2022-05-19 PROCEDURE — 76816 OB US FOLLOW-UP PER FETUS: CPT

## 2022-05-19 ASSESSMENT — PAIN SCALES - GENERAL: PAINLEVEL: NO PAIN (0)

## 2022-05-19 NOTE — NURSING NOTE
"Chief Complaint   Patient presents with     Prenatal Care     31 weeks 5 days   Pt presents to clinic today for prenatal care 31 weeks 5 days. Pt denies any bleeding, or leakage of fluid at this time. States baby is moving good. Patient denies contractions.     Initial /72   Pulse 87   Wt 121.1 kg (267 lb)   LMP 09/26/2021   BMI 41.20 kg/m   Estimated body mass index is 41.2 kg/m  as calculated from the following:    Height as of 11/10/21: 1.715 m (5' 7.5\").    Weight as of this encounter: 121.1 kg (267 lb).  Medication Reconciliation: complete          Karime Dockery LPN  "

## 2022-05-19 NOTE — PROGRESS NOTES
CC: Recheck OB visit at 31w5d    HPI: Logan Fountain presents for a routine OB visit now at 31w5d  Concerns: None  Patient notices normal fetal movement, denies contractions, vaginal bleeding or leaking of fluid.    OB History    Para Term  AB Living   5 1 1 0 3 1   SAB IAB Ectopic Multiple Live Births   3 0 0 0 1      # Outcome Date GA Lbr Kip/2nd Weight Sex Delivery Anes PTL Lv   5 Current            4 Term 20 40w0d 01:15 / 00:47 3.657 kg (8 lb 1 oz) M Vag-Spont EPI N ROSY      Name: JUAN M FOUNTAIN-LOGAN      Apgar1: 9  Apgar5: 9   3 SAB 18     SAB   FD   2 SAB      SAB   FD   1 SAB      SAB   FD     Current Outpatient Medications   Medication     aspirin (ASA) 81 MG chewable tablet     Prenatal MV-Min-Fe Fum-FA-DHA (PRENATAL 1 PO)     No current facility-administered medications for this visit.     O: /72   Pulse 87   Wt 121.1 kg (267 lb)   LMP 2021   BMI 41.20 kg/m    Body mass index is 41.2 kg/m .  See OB flow sheet  EXAM:  NAD  FH: 32 cm  FHT: 145 bpm    No results found for any visits on 22.    A/P: 31w5d gestation    Recheck in 2 weeks      Problem List:     Pregnancy risk factors include:  History of recurrent pregnancy loss   x 1  Body mass index is 39.35 kg/m .    Arthur River MD FACOG  8:38 AM 2022

## 2022-06-07 ENCOUNTER — PRENATAL OFFICE VISIT (OUTPATIENT)
Dept: OBGYN | Facility: OTHER | Age: 27
End: 2022-06-07
Attending: STUDENT IN AN ORGANIZED HEALTH CARE EDUCATION/TRAINING PROGRAM
Payer: COMMERCIAL

## 2022-06-07 VITALS
BODY MASS INDEX: 41.73 KG/M2 | WEIGHT: 270.4 LBS | DIASTOLIC BLOOD PRESSURE: 72 MMHG | HEART RATE: 104 BPM | SYSTOLIC BLOOD PRESSURE: 122 MMHG

## 2022-06-07 DIAGNOSIS — N96 HISTORY OF RECURRENT MISCARRIAGES: Primary | ICD-10-CM

## 2022-06-07 DIAGNOSIS — Z34.93 THIRD TRIMESTER PREGNANCY: ICD-10-CM

## 2022-06-07 PROCEDURE — 99207 PR OB VISIT-NO CHARGE - GICH ONLY: CPT | Performed by: STUDENT IN AN ORGANIZED HEALTH CARE EDUCATION/TRAINING PROGRAM

## 2022-06-07 NOTE — NURSING NOTE
Pt presents to clinic today for prenatal care 34w3d. Pt denies any contractions, bleeding, or leakage of fluid at this time. States baby is moving good.      Medication Reconciliation: complete  Analisa Lux LPN

## 2022-06-07 NOTE — PROGRESS NOTES
Return OB Visit    S: Ms. Fountain is feeling well today. She has no acute concerns. Denies leaking of fluid, vaginal bleeding, painful contractions. Notes fetal movements.    O: Gen: Well-appearing, NAD    FH: 34 cm  FHT: 160 bpm    Assessment:  Ms. Alize Fountain is a 26 year old yo  here for an OB follow up visit. She is currently 34w3d. This pregnancy is complicated by history of recurrent pregnancy loss, BMI 39.    Plan:  # Routine Prenatal Care  -- Datin week US LINN: 2022  -- PNLs:    A+, Ab screen neg   RPR nr   Hep B S Ag neg   Rubella immune   HIV neg      -- Genetic Screening: NIPT low risk male  -- Anatomy US: Normal anatomy after follow up scan performed  -- Immunizations: Tdap   -- 3rd TM labs including CBC, RPR: RPR nr, Hgb 11.4, Plt 231  -- 1 hr GTT: 123  -- GBS: Planned for next visit  -- Postpartum Planning: To be discussed   -- Delivery Planning: No indication for early IOL at this time. To be discussed continually  -- Return to clinic in 1 weeks for OB follow up visit  -- Planning to do at next visit: GBS    # History of recurrent pregnancy loss    # OB history     : 3600 gm    Maricarmen Dewitt MD  OB/GYN  2022 8:53 AM

## 2022-06-16 ENCOUNTER — TELEPHONE (OUTPATIENT)
Dept: OBGYN | Facility: OTHER | Age: 27
End: 2022-06-16

## 2022-06-16 ENCOUNTER — NURSE TRIAGE (OUTPATIENT)
Dept: NURSING | Facility: CLINIC | Age: 27
End: 2022-06-16
Payer: COMMERCIAL

## 2022-06-16 ENCOUNTER — HOSPITAL ENCOUNTER (OUTPATIENT)
Facility: OTHER | Age: 27
Discharge: HOME | End: 2022-06-16
Attending: OBSTETRICS & GYNECOLOGY | Admitting: OBSTETRICS & GYNECOLOGY
Payer: COMMERCIAL

## 2022-06-16 VITALS
OXYGEN SATURATION: 97 % | SYSTOLIC BLOOD PRESSURE: 110 MMHG | TEMPERATURE: 99.2 F | HEART RATE: 90 BPM | RESPIRATION RATE: 18 BRPM | DIASTOLIC BLOOD PRESSURE: 88 MMHG

## 2022-06-16 DIAGNOSIS — N30.00 ACUTE CYSTITIS WITHOUT HEMATURIA: Primary | ICD-10-CM

## 2022-06-16 PROBLEM — Z36.89 ENCOUNTER FOR TRIAGE IN PREGNANT PATIENT: Status: ACTIVE | Noted: 2022-06-16

## 2022-06-16 LAB
ALBUMIN UR-MCNC: 20 MG/DL
AMORPH CRY #/AREA URNS HPF: ABNORMAL /HPF
AMPHETAMINES UR QL: NOT DETECTED
APPEARANCE UR: ABNORMAL
BACTERIA #/AREA URNS HPF: ABNORMAL /HPF
BARBITURATES UR QL SCN: NOT DETECTED
BENZODIAZ UR QL SCN: NOT DETECTED
BILIRUB UR QL STRIP: NEGATIVE
BUPRENORPHINE UR QL: NOT DETECTED
CANNABINOIDS UR QL: NOT DETECTED
CLUE CELLS: ABNORMAL
COCAINE UR QL SCN: NOT DETECTED
COLOR UR AUTO: YELLOW
D-METHAMPHET UR QL: NOT DETECTED
GLUCOSE UR STRIP-MCNC: NEGATIVE MG/DL
HGB UR QL STRIP: NEGATIVE
KETONES UR STRIP-MCNC: NEGATIVE MG/DL
LEUKOCYTE ESTERASE UR QL STRIP: ABNORMAL
METHADONE UR QL SCN: NOT DETECTED
MUCOUS THREADS #/AREA URNS LPF: PRESENT /LPF
NITRATE UR QL: NEGATIVE
OPIATES UR QL SCN: NOT DETECTED
OXYCODONE UR QL SCN: NOT DETECTED
PCP UR QL SCN: NOT DETECTED
PH UR STRIP: 8 [PH] (ref 5–9)
PROPOXYPH UR QL: NOT DETECTED
RBC URINE: 1 /HPF
SP GR UR STRIP: 1.02 (ref 1–1.03)
SQUAMOUS EPITHELIAL: 6 /HPF
TRICHOMONAS, WET PREP: ABNORMAL
TRICYCLICS UR QL SCN: NOT DETECTED
UROBILINOGEN UR STRIP-MCNC: NORMAL MG/DL
WBC URINE: 11 /HPF
WBC'S/HIGH POWER FIELD, WET PREP: ABNORMAL
YEAST, WET PREP: ABNORMAL

## 2022-06-16 PROCEDURE — G0463 HOSPITAL OUTPT CLINIC VISIT: HCPCS

## 2022-06-16 PROCEDURE — 87210 SMEAR WET MOUNT SALINE/INK: CPT | Performed by: OBSTETRICS & GYNECOLOGY

## 2022-06-16 PROCEDURE — 80306 DRUG TEST PRSMV INSTRMNT: CPT | Performed by: OBSTETRICS & GYNECOLOGY

## 2022-06-16 PROCEDURE — 250N000013 HC RX MED GY IP 250 OP 250 PS 637: Performed by: OBSTETRICS & GYNECOLOGY

## 2022-06-16 PROCEDURE — 87086 URINE CULTURE/COLONY COUNT: CPT | Performed by: OBSTETRICS & GYNECOLOGY

## 2022-06-16 PROCEDURE — 81001 URINALYSIS AUTO W/SCOPE: CPT | Mod: XU | Performed by: OBSTETRICS & GYNECOLOGY

## 2022-06-16 RX ORDER — LIDOCAINE 40 MG/G
CREAM TOPICAL
Status: DISCONTINUED | OUTPATIENT
Start: 2022-06-16 | End: 2022-06-16 | Stop reason: HOSPADM

## 2022-06-16 RX ORDER — CEPHALEXIN 500 MG/1
500 CAPSULE ORAL 3 TIMES DAILY
Qty: 21 CAPSULE | Refills: 0 | Status: SHIPPED | OUTPATIENT
Start: 2022-06-16 | End: 2022-06-23

## 2022-06-16 RX ORDER — ACETAMINOPHEN 325 MG/1
975 TABLET ORAL ONCE
Status: COMPLETED | OUTPATIENT
Start: 2022-06-16 | End: 2022-06-16

## 2022-06-16 RX ADMIN — ACETAMINOPHEN 975 MG: 325 TABLET ORAL at 04:21

## 2022-06-16 RX ADMIN — CEPHALEXIN 500 MG: 250 CAPSULE ORAL at 04:21

## 2022-06-16 NOTE — PROGRESS NOTES
"Pt to [facility] with complaints of lower abdomen and back pain that start around 11 pm last night. Constant pain in lower back and intermittent left side pain that radiates into groin aleyda. Denies any leaking of fluid. Emesis x1 early this morning \"from the pain.\" 2 beats of clonus present in right foot, and 1 beat of clonus in left foot. Reflexes are normal. BP elevated 145/98 upon arrival. Denies headache, vision changes, dizziness, or lightheadedness. EFM/EUM placed on pt. UA and wet prep sent to lab.     BP recheck 110/88    "

## 2022-06-16 NOTE — PROGRESS NOTES
Dr. River updated on pt arrival and status. Order to treat for UTI. Give first dose of Keflex 500 mg po now and prescription will be sent for Keflex 500 mg TID for 7 days. Tylenol given for discomfort. -130, accels present, moderate variability. Occasional contraction. 3 contractions in 2 hours per toco.

## 2022-06-16 NOTE — TELEPHONE ENCOUNTER
Call to patient to check on her after discharge. No answer and mail box is full.     Yue Ruiz RN on 6/16/2022 at 11:01 AM

## 2022-06-16 NOTE — TELEPHONE ENCOUNTER
"OB Triage Call      Is patient's OB/Midwife with the [facility] or [facility]? [facility]- Proceed with triage     Reason for call: Patient reports she is having contractions.    Assessment: Patient is 35 weeks, 5 days pregnant, .  She reports she has been having constant 7/10 low back pain radiating to lower abdomen for last 2 hours.  She reports she has irregular contractions and has not timed them.  Patient reports \"feel like I need to stool\".  She denies vaginal bleeding, leakage of fluids, or severe constant pain    Plan: Go to L&D    Patient plans to deliver at [facility] ([facility])    Patient's primary OB Provider is Dr River.      Per protocol recommendations Go to L&D, notified Denny at [facility].    Is patient's delivering hospital on divert? No      35w5d    Estimated Date of Delivery: 2022        OB History    Para Term  AB Living   5 1 1 0 3 1   SAB IAB Ectopic Multiple Live Births   3 0 0 0 1      # Outcome Date GA Lbr Kip/2nd Weight Sex Delivery Anes PTL Lv   5 Current            4 Term 20 40w0d 01:15 / 00:47 3.657 kg (8 lb 1 oz) M Vag-Spont EPI N ROSY      Name: JUAN M BENNETT-LOGAN      Apgar1: 9  Apgar5: 9   3 SAB 18     SAB   FD   2 SAB      SAB   FD   1 SAB      SAB   FD       No results found for: GBS       Aline Peng RN 22 1:15 AM  [facility] Nurse Advisor  Reason for Disposition    MODERATE-SEVERE abdominal pain    Additional Information    Negative: Passed out (i.e., lost consciousness, collapsed and was not responding)    Negative: Shock suspected (e.g., cold/pale/clammy skin, too weak to stand, low BP, rapid pulse)    Negative: Difficult to awaken or acting confused (e.g., disoriented, slurred speech)    Negative: [1] SEVERE abdominal pain (e.g., excruciating) AND [2] constant AND [3] present > 1 hour    Negative: Severe bleeding (e.g., continuous red blood from vagina, or large blood clots)    Negative: Umbilical cord hanging out of " "the vagina (shiny, white, curled appearance, \"like telephone cord\")    Negative: Uncontrollable urge to push (i.e., feels like baby is coming out now)    Negative: Can see baby    Negative: Sounds like a life-threatening emergency to the triager    Protocols used: PREGNANCY - LABOR - [id number]      "

## 2022-06-16 NOTE — TELEPHONE ENCOUNTER
Call returned from patient who states that she still has back pain 8/10 but it is no longer radiating. She notes that she is also puking with diarrhea. She is managing okay at home. She notes fever of 100.9 but tylenol helped. Per DAB she can continue managing at home but with any worsening she should present to [facility].     Yue Ruiz RN on 6/16/2022 at 1:15 PM

## 2022-06-16 NOTE — PROGRESS NOTES
Discharge Note    Patient Discharged to home on 6/16/2022 5:15 AM via Private Car accompanied by .     Patient informed of discharge instructions in AVS. patient verbalizes understanding and denies having any questions pertaining to AVS.     Patricia Amos RN  6/16/2022  5:16 AM

## 2022-06-17 LAB — BACTERIA UR CULT: NORMAL

## 2022-06-21 ENCOUNTER — PRENATAL OFFICE VISIT (OUTPATIENT)
Dept: OBGYN | Facility: OTHER | Age: 27
End: 2022-06-21
Attending: OBSTETRICS & GYNECOLOGY
Payer: COMMERCIAL

## 2022-06-21 VITALS
BODY MASS INDEX: 42.13 KG/M2 | WEIGHT: 273 LBS | SYSTOLIC BLOOD PRESSURE: 120 MMHG | HEART RATE: 94 BPM | DIASTOLIC BLOOD PRESSURE: 74 MMHG

## 2022-06-21 DIAGNOSIS — Z34.93 THIRD TRIMESTER PREGNANCY: Primary | ICD-10-CM

## 2022-06-21 PROCEDURE — 87653 STREP B DNA AMP PROBE: CPT | Mod: ZL | Performed by: OBSTETRICS & GYNECOLOGY

## 2022-06-21 PROCEDURE — 99207 PR OB VISIT-NO CHARGE - GICH ONLY: CPT | Performed by: OBSTETRICS & GYNECOLOGY

## 2022-06-21 ASSESSMENT — PAIN SCALES - GENERAL: PAINLEVEL: NO PAIN (0)

## 2022-06-21 NOTE — PROGRESS NOTES
CC: Recheck OB visit at 36w3d    HPI: Logan Fountain presents for a routine OB visit now at 36w3d  Concerns: None, last week she had a viral GI issue, now better, she was treated for UTI as well.    Patient notices normal fetal movement, denies contractions, vaginal bleeding or leaking of fluid.    OB History    Para Term  AB Living   5 1 1 0 3 1   SAB IAB Ectopic Multiple Live Births   3 0 0 0 1      # Outcome Date GA Lbr Kip/2nd Weight Sex Delivery Anes PTL Lv   5 Current            4 Term 20 40w0d 01:15  00:47 3.657 kg (8 lb 1 oz) M Vag-Spont EPI N ROSY      Name: SABRINA,MALE-LOGAN      Apgar1: 9  Apgar5: 9   3 SAB 18     SAB   FD   2 SAB      SAB   FD   1 SAB      SAB   FD     Current Outpatient Medications   Medication     aspirin (ASA) 81 MG chewable tablet     cephALEXin (KEFLEX) 500 MG capsule     Prenatal MV-Min-Fe Fum-FA-DHA (PRENATAL 1 PO)     No current facility-administered medications for this visit.     O: /74   Pulse 94   Wt 123.8 kg (273 lb)   LMP 2021   BMI 42.13 kg/m    Body mass index is 42.13 kg/m .  See OB flow sheet  EXAM:  NAD    FHT:125 bpm  Cx 1-2/50/-2 cephalic, soft and posterior    No results found for any visits on 22.    A/P: (Z34.93) Third trimester pregnancy  (primary encounter diagnosis)  Comment:   Plan: Strep, Group B by PCR          Recheck in 1 weeks    Problem List:     Pregnancy risk factors include:  History of recurrent pregnancy loss   x 1  Body mass index is 39.35 kg/m .    Arthur River MD FACOG  9:37 AM 2022

## 2022-06-21 NOTE — NURSING NOTE
"Chief Complaint   Patient presents with     Prenatal Care     36 weeks 3 days   Pt presents to clinic today for prenatal care 36 weeks 3 days. Pt denies any bleeding, or leakage of fluid at this time. States baby is moving good. Patient denies contractions.     Initial /74   Pulse 94   Wt 123.8 kg (273 lb)   LMP 09/26/2021   BMI 42.13 kg/m   Estimated body mass index is 42.13 kg/m  as calculated from the following:    Height as of 11/10/21: 1.715 m (5' 7.5\").    Weight as of this encounter: 123.8 kg (273 lb).  Medication Reconciliation: complete          Karime Dockery LPN  "

## 2022-06-22 LAB — GP B STREP DNA SPEC QL NAA+PROBE: NEGATIVE

## 2022-06-28 ENCOUNTER — PRENATAL OFFICE VISIT (OUTPATIENT)
Dept: OBGYN | Facility: OTHER | Age: 27
End: 2022-06-28
Attending: OBSTETRICS & GYNECOLOGY
Payer: COMMERCIAL

## 2022-06-28 VITALS
WEIGHT: 275 LBS | DIASTOLIC BLOOD PRESSURE: 74 MMHG | SYSTOLIC BLOOD PRESSURE: 124 MMHG | BODY MASS INDEX: 42.44 KG/M2 | HEART RATE: 103 BPM

## 2022-06-28 DIAGNOSIS — Z34.93 THIRD TRIMESTER PREGNANCY: Primary | ICD-10-CM

## 2022-06-28 PROCEDURE — 99207 PR OB VISIT-NO CHARGE - GICH ONLY: CPT | Performed by: OBSTETRICS & GYNECOLOGY

## 2022-06-28 ASSESSMENT — PAIN SCALES - GENERAL: PAINLEVEL: NO PAIN (0)

## 2022-06-28 NOTE — NURSING NOTE
"Chief Complaint   Patient presents with     Prenatal Care     37 weeks 3 days     Pt presents to clinic today for prenatal care 37 weeks 3 day. Pt denies any bleeding, or leakage of fluid at this time. States baby is moving good. Patient denies contractions.   Initial /74   Pulse 103   Wt 124.7 kg (275 lb)   LMP 09/26/2021   BMI 42.44 kg/m   Estimated body mass index is 42.44 kg/m  as calculated from the following:    Height as of 11/10/21: 1.715 m (5' 7.5\").    Weight as of this encounter: 124.7 kg (275 lb).  Medication Reconciliation: complete    FOOD SECURITY SCREENING QUESTIONS  Hunger Vital Signs:  Within the past 12 months we worried whether our food would run out before we got money to buy more. Never  Within the past 12 months the food we bought just didn't last and we didn't have money to get more. Never  Karime Dockery LPN 6/28/2022 9:30 AM             Karime Dockery LPN  "

## 2022-06-28 NOTE — PROGRESS NOTES
CC: Recheck OB visit at 37w3d    HPI: Logan Fountain presents for a routine OB visit now at 37w3d  Concerns: None  Patient notices normal fetal movement, denies contractions, vaginal bleeding or leaking of fluid.    OB History    Para Term  AB Living   5 1 1 0 3 1   SAB IAB Ectopic Multiple Live Births   3 0 0 0 1      # Outcome Date GA Lbr Kip/2nd Weight Sex Delivery Anes PTL Lv   5 Current            4 Term 20 40w0d 01:15 / 00:47 3.657 kg (8 lb 1 oz) M Vag-Spont EPI N ROSY      Name: JUAN M FOUNTAIN-LOGAN      Apgar1: 9  Apgar5: 9   3 SAB 18     SAB   FD   2 SAB      SAB   FD   1 SAB      SAB   FD     Current Outpatient Medications   Medication     aspirin (ASA) 81 MG chewable tablet     Prenatal MV-Min-Fe Fum-FA-DHA (PRENATAL 1 PO)     No current facility-administered medications for this visit.         O: /74   Pulse 103   Wt 124.7 kg (275 lb)   LMP 2021   BMI 42.44 kg/m    Body mass index is 42.44 kg/m .  See OB flow sheet  EXAM:  NAD  EFW 7 lb10 oz  FHT:144 bpm  cx 2.5/25/-2 soft, anterior cephalic    No results found for any visits on 22.    A/P: 37w3d    Recheck in 1 week      Problem List:     Pregnancy risk factors include:  History of recurrent pregnancy loss   x 1  Body mass index is 39.35 kg/m .    Arthur River MD FACOG  9:50 AM 2022

## 2022-07-05 ENCOUNTER — PRENATAL OFFICE VISIT (OUTPATIENT)
Dept: OBGYN | Facility: OTHER | Age: 27
End: 2022-07-05
Attending: OBSTETRICS & GYNECOLOGY
Payer: COMMERCIAL

## 2022-07-05 VITALS
DIASTOLIC BLOOD PRESSURE: 72 MMHG | BODY MASS INDEX: 42.74 KG/M2 | HEART RATE: 93 BPM | SYSTOLIC BLOOD PRESSURE: 122 MMHG | WEIGHT: 277 LBS

## 2022-07-05 DIAGNOSIS — Z34.90 NORMAL PREGNANCY, ANTEPARTUM: Primary | ICD-10-CM

## 2022-07-05 PROCEDURE — 99207 PR OB VISIT-NO CHARGE - GICH ONLY: CPT | Performed by: OBSTETRICS & GYNECOLOGY

## 2022-07-05 RX ORDER — NALOXONE HYDROCHLORIDE 0.4 MG/ML
0.4 INJECTION, SOLUTION INTRAMUSCULAR; INTRAVENOUS; SUBCUTANEOUS
Status: CANCELLED | OUTPATIENT
Start: 2022-07-05

## 2022-07-05 RX ORDER — KETOROLAC TROMETHAMINE 30 MG/ML
30 INJECTION, SOLUTION INTRAMUSCULAR; INTRAVENOUS
Status: CANCELLED | OUTPATIENT
Start: 2022-07-05 | End: 2022-07-10

## 2022-07-05 RX ORDER — CARBOPROST TROMETHAMINE 250 UG/ML
250 INJECTION, SOLUTION INTRAMUSCULAR
Status: CANCELLED | OUTPATIENT
Start: 2022-07-05

## 2022-07-05 RX ORDER — LIDOCAINE 40 MG/G
CREAM TOPICAL
Status: CANCELLED | OUTPATIENT
Start: 2022-07-05

## 2022-07-05 RX ORDER — OXYTOCIN/0.9 % SODIUM CHLORIDE 30/500 ML
1-24 PLASTIC BAG, INJECTION (ML) INTRAVENOUS CONTINUOUS
Status: CANCELLED | OUTPATIENT
Start: 2022-07-05

## 2022-07-05 RX ORDER — NALOXONE HYDROCHLORIDE 0.4 MG/ML
0.2 INJECTION, SOLUTION INTRAMUSCULAR; INTRAVENOUS; SUBCUTANEOUS
Status: CANCELLED | OUTPATIENT
Start: 2022-07-05

## 2022-07-05 RX ORDER — OXYTOCIN 10 [USP'U]/ML
10 INJECTION, SOLUTION INTRAMUSCULAR; INTRAVENOUS
Status: CANCELLED | OUTPATIENT
Start: 2022-07-05

## 2022-07-05 RX ORDER — METOCLOPRAMIDE HYDROCHLORIDE 5 MG/ML
10 INJECTION INTRAMUSCULAR; INTRAVENOUS EVERY 6 HOURS PRN
Status: CANCELLED | OUTPATIENT
Start: 2022-07-05

## 2022-07-05 RX ORDER — OXYTOCIN/0.9 % SODIUM CHLORIDE 30/500 ML
100-340 PLASTIC BAG, INJECTION (ML) INTRAVENOUS CONTINUOUS PRN
Status: CANCELLED | OUTPATIENT
Start: 2022-07-05

## 2022-07-05 RX ORDER — TRANEXAMIC ACID 10 MG/ML
1 INJECTION, SOLUTION INTRAVENOUS EVERY 30 MIN PRN
Status: CANCELLED | OUTPATIENT
Start: 2022-07-05

## 2022-07-05 RX ORDER — PROCHLORPERAZINE 25 MG
25 SUPPOSITORY, RECTAL RECTAL EVERY 12 HOURS PRN
Status: CANCELLED | OUTPATIENT
Start: 2022-07-05

## 2022-07-05 RX ORDER — FENTANYL CITRATE 50 UG/ML
100 INJECTION, SOLUTION INTRAMUSCULAR; INTRAVENOUS
Status: CANCELLED | OUTPATIENT
Start: 2022-07-05

## 2022-07-05 RX ORDER — SODIUM CHLORIDE, SODIUM LACTATE, POTASSIUM CHLORIDE, CALCIUM CHLORIDE 600; 310; 30; 20 MG/100ML; MG/100ML; MG/100ML; MG/100ML
INJECTION, SOLUTION INTRAVENOUS CONTINUOUS
Status: CANCELLED | OUTPATIENT
Start: 2022-07-05

## 2022-07-05 RX ORDER — METHYLERGONOVINE MALEATE 0.2 MG/ML
200 INJECTION INTRAVENOUS
Status: CANCELLED | OUTPATIENT
Start: 2022-07-05

## 2022-07-05 RX ORDER — SODIUM CHLORIDE, SODIUM LACTATE, POTASSIUM CHLORIDE, CALCIUM CHLORIDE 600; 310; 30; 20 MG/100ML; MG/100ML; MG/100ML; MG/100ML
INJECTION, SOLUTION INTRAVENOUS CONTINUOUS PRN
Status: CANCELLED | OUTPATIENT
Start: 2022-07-05

## 2022-07-05 RX ORDER — CITRIC ACID/SODIUM CITRATE 334-500MG
30 SOLUTION, ORAL ORAL
Status: CANCELLED | OUTPATIENT
Start: 2022-07-05

## 2022-07-05 RX ORDER — MISOPROSTOL 100 UG/1
400 TABLET ORAL
Status: CANCELLED | OUTPATIENT
Start: 2022-07-05

## 2022-07-05 RX ORDER — ONDANSETRON 2 MG/ML
4 INJECTION INTRAMUSCULAR; INTRAVENOUS EVERY 6 HOURS PRN
Status: CANCELLED | OUTPATIENT
Start: 2022-07-05

## 2022-07-05 RX ORDER — IBUPROFEN 200 MG
800 TABLET ORAL
Status: CANCELLED | OUTPATIENT
Start: 2022-07-05 | End: 2022-07-10

## 2022-07-05 RX ORDER — ONDANSETRON 4 MG/1
4 TABLET, ORALLY DISINTEGRATING ORAL EVERY 6 HOURS PRN
Status: CANCELLED | OUTPATIENT
Start: 2022-07-05

## 2022-07-05 RX ORDER — METOCLOPRAMIDE 10 MG/1
10 TABLET ORAL EVERY 6 HOURS PRN
Status: CANCELLED | OUTPATIENT
Start: 2022-07-05

## 2022-07-05 RX ORDER — PROCHLORPERAZINE MALEATE 10 MG
10 TABLET ORAL EVERY 6 HOURS PRN
Status: CANCELLED | OUTPATIENT
Start: 2022-07-05

## 2022-07-05 RX ORDER — OXYTOCIN/0.9 % SODIUM CHLORIDE 30/500 ML
340 PLASTIC BAG, INJECTION (ML) INTRAVENOUS CONTINUOUS PRN
Status: CANCELLED | OUTPATIENT
Start: 2022-07-05

## 2022-07-05 ASSESSMENT — PAIN SCALES - GENERAL: PAINLEVEL: NO PAIN (0)

## 2022-07-05 NOTE — NURSING NOTE
"Chief Complaint   Patient presents with     Prenatal Care     38 weeks 3 days   Pt presents to clinic today for prenatal care 38 weeks 3 days. Pt denies any bleeding, or leakage of fluid at this time. States baby is moving good. Patient denies contractions.     Initial /72   Pulse 93   Wt 125.6 kg (277 lb)   LMP 09/26/2021   BMI 42.74 kg/m   Estimated body mass index is 42.74 kg/m  as calculated from the following:    Height as of 11/10/21: 1.715 m (5' 7.5\").    Weight as of this encounter: 125.6 kg (277 lb).  Medication Reconciliation: complete          Karime Dockery LPN  "

## 2022-07-05 NOTE — PROGRESS NOTES
CC: Recheck OB visit at 38w3d    HPI: Logan Fountain presents for a routine OB visit now at 38w3d  Concerns: TOP  Patient notices normal fetal movement, denies contractions, vaginal bleeding or leaking of fluid.    OB History    Para Term  AB Living   5 1 1 0 3 1   SAB IAB Ectopic Multiple Live Births   3 0 0 0 1      # Outcome Date GA Lbr Kip/2nd Weight Sex Delivery Anes PTL Lv   5 Current            4 Term 20 40w0d 01:15 / 00:47 3.657 kg (8 lb 1 oz) M Vag-Spont EPI N ROSY      Name: JUAN M FOUNTAIN-LOGAN      Apgar1: 9  Apgar5: 9   3 SAB 18     SAB   FD   2 SAB      SAB   FD   1 SAB      SAB   FD     Current Outpatient Medications   Medication     aspirin (ASA) 81 MG chewable tablet     Prenatal MV-Min-Fe Fum-FA-DHA (PRENATAL 1 PO)     No current facility-administered medications for this visit.         O: /72   Pulse 93   Wt 125.6 kg (277 lb)   LMP 2021   BMI 42.74 kg/m    Body mass index is 42.74 kg/m .  See OB flow sheet  EXAM:  NAD  S=D 3800 g  FHT:145 bpm    No results found for any visits on 22.    A/P: 38w3d    Induction planned in one week      Problem List:     Pregnancy risk factors include:  History of recurrent pregnancy loss   x 1  Body mass index is 39.35 kg/m .    Arthur River MD FACOG  9:38 AM 2022

## 2022-07-12 ENCOUNTER — ANESTHESIA EVENT (OUTPATIENT)
Dept: OBGYN | Facility: OTHER | Age: 27
End: 2022-07-12
Payer: COMMERCIAL

## 2022-07-12 ENCOUNTER — ANESTHESIA (OUTPATIENT)
Dept: OBGYN | Facility: OTHER | Age: 27
End: 2022-07-12
Payer: COMMERCIAL

## 2022-07-12 ENCOUNTER — HOSPITAL ENCOUNTER (INPATIENT)
Facility: OTHER | Age: 27
LOS: 1 days | Discharge: HOME | End: 2022-07-13
Attending: OBSTETRICS & GYNECOLOGY | Admitting: OBSTETRICS & GYNECOLOGY
Payer: COMMERCIAL

## 2022-07-12 DIAGNOSIS — Z34.90 NORMAL PREGNANCY, ANTEPARTUM: ICD-10-CM

## 2022-07-12 LAB
ABO/RH(D): NORMAL
ANTIBODY SCREEN: NEGATIVE
ERYTHROCYTE [DISTWIDTH] IN BLOOD BY AUTOMATED COUNT: 13.9 % (ref 10–15)
FLUAV RNA SPEC QL NAA+PROBE: NEGATIVE
FLUBV RNA RESP QL NAA+PROBE: NEGATIVE
HCT VFR BLD AUTO: 34.2 % (ref 35–47)
HGB BLD-MCNC: 11.5 G/DL (ref 11.7–15.7)
MCH RBC QN AUTO: 27.9 PG (ref 26.5–33)
MCHC RBC AUTO-ENTMCNC: 33.6 G/DL (ref 31.5–36.5)
MCV RBC AUTO: 83 FL (ref 78–100)
PLATELET # BLD AUTO: 252 10E3/UL (ref 150–450)
RBC # BLD AUTO: 4.12 10E6/UL (ref 3.8–5.2)
RSV RNA SPEC NAA+PROBE: NEGATIVE
SARS-COV-2 RNA RESP QL NAA+PROBE: NEGATIVE
SPECIMEN EXPIRATION DATE: NORMAL
T PALLIDUM AB SER QL: NONREACTIVE
WBC # BLD AUTO: 11 10E3/UL (ref 4–11)

## 2022-07-12 PROCEDURE — 10907ZC DRAINAGE OF AMNIOTIC FLUID, THERAPEUTIC FROM PRODUCTS OF CONCEPTION, VIA NATURAL OR ARTIFICIAL OPENING: ICD-10-PCS | Performed by: OBSTETRICS & GYNECOLOGY

## 2022-07-12 PROCEDURE — 250N000013 HC RX MED GY IP 250 OP 250 PS 637: Performed by: NURSE ANESTHETIST, CERTIFIED REGISTERED

## 2022-07-12 PROCEDURE — 250N000013 HC RX MED GY IP 250 OP 250 PS 637: Performed by: OBSTETRICS & GYNECOLOGY

## 2022-07-12 PROCEDURE — 370N000003 HC ANESTHESIA WARD SERVICE

## 2022-07-12 PROCEDURE — 86780 TREPONEMA PALLIDUM: CPT | Performed by: OBSTETRICS & GYNECOLOGY

## 2022-07-12 PROCEDURE — 0KQM0ZZ REPAIR PERINEUM MUSCLE, OPEN APPROACH: ICD-10-PCS | Performed by: OBSTETRICS & GYNECOLOGY

## 2022-07-12 PROCEDURE — 258N000003 HC RX IP 258 OP 636: Performed by: OBSTETRICS & GYNECOLOGY

## 2022-07-12 PROCEDURE — 258N000003 HC RX IP 258 OP 636: Performed by: NURSE ANESTHETIST, CERTIFIED REGISTERED

## 2022-07-12 PROCEDURE — 250N000011 HC RX IP 250 OP 636: Performed by: NURSE ANESTHETIST, CERTIFIED REGISTERED

## 2022-07-12 PROCEDURE — 85027 COMPLETE CBC AUTOMATED: CPT | Performed by: OBSTETRICS & GYNECOLOGY

## 2022-07-12 PROCEDURE — 250N000009 HC RX 250: Performed by: OBSTETRICS & GYNECOLOGY

## 2022-07-12 PROCEDURE — 59400 OBSTETRICAL CARE: CPT | Performed by: NURSE ANESTHETIST, CERTIFIED REGISTERED

## 2022-07-12 PROCEDURE — 722N000001 HC LABOR CARE VAGINAL DELIVERY SINGLE

## 2022-07-12 PROCEDURE — 59400 OBSTETRICAL CARE: CPT | Performed by: OBSTETRICS & GYNECOLOGY

## 2022-07-12 PROCEDURE — 3E033VJ INTRODUCTION OF OTHER HORMONE INTO PERIPHERAL VEIN, PERCUTANEOUS APPROACH: ICD-10-PCS | Performed by: OBSTETRICS & GYNECOLOGY

## 2022-07-12 PROCEDURE — 36415 COLL VENOUS BLD VENIPUNCTURE: CPT | Performed by: OBSTETRICS & GYNECOLOGY

## 2022-07-12 PROCEDURE — 250N000009 HC RX 250: Performed by: NURSE ANESTHETIST, CERTIFIED REGISTERED

## 2022-07-12 PROCEDURE — 86901 BLOOD TYPING SEROLOGIC RH(D): CPT | Performed by: OBSTETRICS & GYNECOLOGY

## 2022-07-12 PROCEDURE — 120N000001 HC R&B MED SURG/OB

## 2022-07-12 PROCEDURE — 87637 SARSCOV2&INF A&B&RSV AMP PRB: CPT | Performed by: OBSTETRICS & GYNECOLOGY

## 2022-07-12 RX ORDER — FENTANYL CITRATE 50 UG/ML
100 INJECTION, SOLUTION INTRAMUSCULAR; INTRAVENOUS
Status: DISCONTINUED | OUTPATIENT
Start: 2022-07-12 | End: 2022-07-12 | Stop reason: HOSPADM

## 2022-07-12 RX ORDER — METHYLERGONOVINE MALEATE 0.2 MG/ML
200 INJECTION INTRAVENOUS
Status: DISCONTINUED | OUTPATIENT
Start: 2022-07-12 | End: 2022-07-13 | Stop reason: HOSPADM

## 2022-07-12 RX ORDER — NALOXONE HYDROCHLORIDE 0.4 MG/ML
0.2 INJECTION, SOLUTION INTRAMUSCULAR; INTRAVENOUS; SUBCUTANEOUS
Status: DISCONTINUED | OUTPATIENT
Start: 2022-07-12 | End: 2022-07-12 | Stop reason: HOSPADM

## 2022-07-12 RX ORDER — CARBOPROST TROMETHAMINE 250 UG/ML
250 INJECTION, SOLUTION INTRAMUSCULAR
Status: DISCONTINUED | OUTPATIENT
Start: 2022-07-12 | End: 2022-07-13 | Stop reason: HOSPADM

## 2022-07-12 RX ORDER — CARBOPROST TROMETHAMINE 250 UG/ML
250 INJECTION, SOLUTION INTRAMUSCULAR
Status: DISCONTINUED | OUTPATIENT
Start: 2022-07-12 | End: 2022-07-12 | Stop reason: HOSPADM

## 2022-07-12 RX ORDER — LIDOCAINE HYDROCHLORIDE AND EPINEPHRINE 15; 5 MG/ML; UG/ML
3 INJECTION, SOLUTION EPIDURAL
Status: DISCONTINUED | OUTPATIENT
Start: 2022-07-12 | End: 2022-07-12 | Stop reason: HOSPADM

## 2022-07-12 RX ORDER — LIDOCAINE 40 MG/G
CREAM TOPICAL
Status: DISCONTINUED | OUTPATIENT
Start: 2022-07-12 | End: 2022-07-12 | Stop reason: HOSPADM

## 2022-07-12 RX ORDER — PROCHLORPERAZINE 25 MG
25 SUPPOSITORY, RECTAL RECTAL EVERY 12 HOURS PRN
Status: DISCONTINUED | OUTPATIENT
Start: 2022-07-12 | End: 2022-07-12 | Stop reason: HOSPADM

## 2022-07-12 RX ORDER — OXYTOCIN/0.9 % SODIUM CHLORIDE 30/500 ML
340 PLASTIC BAG, INJECTION (ML) INTRAVENOUS CONTINUOUS PRN
Status: DISCONTINUED | OUTPATIENT
Start: 2022-07-12 | End: 2022-07-13 | Stop reason: HOSPADM

## 2022-07-12 RX ORDER — OXYTOCIN 10 [USP'U]/ML
10 INJECTION, SOLUTION INTRAMUSCULAR; INTRAVENOUS
Status: DISCONTINUED | OUTPATIENT
Start: 2022-07-12 | End: 2022-07-13 | Stop reason: HOSPADM

## 2022-07-12 RX ORDER — ONDANSETRON 2 MG/ML
4 INJECTION INTRAMUSCULAR; INTRAVENOUS EVERY 6 HOURS PRN
Status: DISCONTINUED | OUTPATIENT
Start: 2022-07-12 | End: 2022-07-12 | Stop reason: HOSPADM

## 2022-07-12 RX ORDER — LIDOCAINE HYDROCHLORIDE AND EPINEPHRINE 15; 5 MG/ML; UG/ML
INJECTION, SOLUTION EPIDURAL PRN
Status: DISCONTINUED | OUTPATIENT
Start: 2022-07-12 | End: 2022-07-12

## 2022-07-12 RX ORDER — OXYTOCIN 10 [USP'U]/ML
10 INJECTION, SOLUTION INTRAMUSCULAR; INTRAVENOUS
Status: DISCONTINUED | OUTPATIENT
Start: 2022-07-12 | End: 2022-07-12 | Stop reason: HOSPADM

## 2022-07-12 RX ORDER — PROCHLORPERAZINE MALEATE 10 MG
10 TABLET ORAL EVERY 6 HOURS PRN
Status: DISCONTINUED | OUTPATIENT
Start: 2022-07-12 | End: 2022-07-12 | Stop reason: HOSPADM

## 2022-07-12 RX ORDER — METOCLOPRAMIDE HYDROCHLORIDE 5 MG/ML
10 INJECTION INTRAMUSCULAR; INTRAVENOUS EVERY 6 HOURS PRN
Status: DISCONTINUED | OUTPATIENT
Start: 2022-07-12 | End: 2022-07-12 | Stop reason: HOSPADM

## 2022-07-12 RX ORDER — CITRIC ACID/SODIUM CITRATE 334-500MG
30 SOLUTION, ORAL ORAL
Status: DISCONTINUED | OUTPATIENT
Start: 2022-07-12 | End: 2022-07-12 | Stop reason: HOSPADM

## 2022-07-12 RX ORDER — OXYTOCIN/0.9 % SODIUM CHLORIDE 30/500 ML
340 PLASTIC BAG, INJECTION (ML) INTRAVENOUS CONTINUOUS PRN
Status: DISCONTINUED | OUTPATIENT
Start: 2022-07-12 | End: 2022-07-12 | Stop reason: HOSPADM

## 2022-07-12 RX ORDER — SODIUM CHLORIDE, SODIUM LACTATE, POTASSIUM CHLORIDE, CALCIUM CHLORIDE 600; 310; 30; 20 MG/100ML; MG/100ML; MG/100ML; MG/100ML
INJECTION, SOLUTION INTRAVENOUS CONTINUOUS
Status: DISCONTINUED | OUTPATIENT
Start: 2022-07-12 | End: 2022-07-12 | Stop reason: HOSPADM

## 2022-07-12 RX ORDER — NALOXONE HYDROCHLORIDE 0.4 MG/ML
0.4 INJECTION, SOLUTION INTRAMUSCULAR; INTRAVENOUS; SUBCUTANEOUS
Status: DISCONTINUED | OUTPATIENT
Start: 2022-07-12 | End: 2022-07-12 | Stop reason: HOSPADM

## 2022-07-12 RX ORDER — HYDROCORTISONE 25 MG/G
CREAM TOPICAL 3 TIMES DAILY PRN
Status: DISCONTINUED | OUTPATIENT
Start: 2022-07-12 | End: 2022-07-13 | Stop reason: HOSPADM

## 2022-07-12 RX ORDER — ACETAMINOPHEN 325 MG/1
650 TABLET ORAL EVERY 4 HOURS PRN
Status: DISCONTINUED | OUTPATIENT
Start: 2022-07-12 | End: 2022-07-13 | Stop reason: HOSPADM

## 2022-07-12 RX ORDER — NALBUPHINE HYDROCHLORIDE 10 MG/ML
2.5-5 INJECTION, SOLUTION INTRAMUSCULAR; INTRAVENOUS; SUBCUTANEOUS EVERY 6 HOURS PRN
Status: DISCONTINUED | OUTPATIENT
Start: 2022-07-12 | End: 2022-07-13 | Stop reason: HOSPADM

## 2022-07-12 RX ORDER — SODIUM CHLORIDE, SODIUM LACTATE, POTASSIUM CHLORIDE, CALCIUM CHLORIDE 600; 310; 30; 20 MG/100ML; MG/100ML; MG/100ML; MG/100ML
INJECTION, SOLUTION INTRAVENOUS CONTINUOUS PRN
Status: DISCONTINUED | OUTPATIENT
Start: 2022-07-12 | End: 2022-07-12 | Stop reason: HOSPADM

## 2022-07-12 RX ORDER — BISACODYL 10 MG
10 SUPPOSITORY, RECTAL RECTAL DAILY PRN
Status: DISCONTINUED | OUTPATIENT
Start: 2022-07-12 | End: 2022-07-13 | Stop reason: HOSPADM

## 2022-07-12 RX ORDER — DOCUSATE SODIUM 100 MG/1
100 CAPSULE, LIQUID FILLED ORAL DAILY
Status: DISCONTINUED | OUTPATIENT
Start: 2022-07-12 | End: 2022-07-13 | Stop reason: HOSPADM

## 2022-07-12 RX ORDER — MODIFIED LANOLIN
OINTMENT (GRAM) TOPICAL
Status: DISCONTINUED | OUTPATIENT
Start: 2022-07-12 | End: 2022-07-13 | Stop reason: HOSPADM

## 2022-07-12 RX ORDER — MISOPROSTOL 100 UG/1
400 TABLET ORAL
Status: DISCONTINUED | OUTPATIENT
Start: 2022-07-12 | End: 2022-07-12 | Stop reason: HOSPADM

## 2022-07-12 RX ORDER — METOCLOPRAMIDE 10 MG/1
10 TABLET ORAL EVERY 6 HOURS PRN
Status: DISCONTINUED | OUTPATIENT
Start: 2022-07-12 | End: 2022-07-12 | Stop reason: HOSPADM

## 2022-07-12 RX ORDER — IBUPROFEN 400 MG/1
800 TABLET, FILM COATED ORAL EVERY 6 HOURS PRN
Status: DISCONTINUED | OUTPATIENT
Start: 2022-07-12 | End: 2022-07-13 | Stop reason: HOSPADM

## 2022-07-12 RX ORDER — ONDANSETRON 4 MG/1
4 TABLET, ORALLY DISINTEGRATING ORAL EVERY 6 HOURS PRN
Status: DISCONTINUED | OUTPATIENT
Start: 2022-07-12 | End: 2022-07-12 | Stop reason: HOSPADM

## 2022-07-12 RX ORDER — TRANEXAMIC ACID 10 MG/ML
1 INJECTION, SOLUTION INTRAVENOUS EVERY 30 MIN PRN
Status: DISCONTINUED | OUTPATIENT
Start: 2022-07-12 | End: 2022-07-12 | Stop reason: HOSPADM

## 2022-07-12 RX ORDER — TRANEXAMIC ACID 10 MG/ML
1 INJECTION, SOLUTION INTRAVENOUS EVERY 30 MIN PRN
Status: DISCONTINUED | OUTPATIENT
Start: 2022-07-12 | End: 2022-07-13 | Stop reason: HOSPADM

## 2022-07-12 RX ORDER — OXYTOCIN/0.9 % SODIUM CHLORIDE 30/500 ML
1-24 PLASTIC BAG, INJECTION (ML) INTRAVENOUS CONTINUOUS
Status: DISCONTINUED | OUTPATIENT
Start: 2022-07-12 | End: 2022-07-12 | Stop reason: HOSPADM

## 2022-07-12 RX ORDER — CITRIC ACID/SODIUM CITRATE 334-500MG
30 SOLUTION, ORAL ORAL ONCE
Status: COMPLETED | OUTPATIENT
Start: 2022-07-12 | End: 2022-07-12

## 2022-07-12 RX ORDER — LIDOCAINE HYDROCHLORIDE 10 MG/ML
INJECTION, SOLUTION EPIDURAL; INFILTRATION; INTRACAUDAL; PERINEURAL PRN
Status: DISCONTINUED | OUTPATIENT
Start: 2022-07-12 | End: 2022-07-12

## 2022-07-12 RX ORDER — METHYLERGONOVINE MALEATE 0.2 MG/ML
200 INJECTION INTRAVENOUS
Status: DISCONTINUED | OUTPATIENT
Start: 2022-07-12 | End: 2022-07-12 | Stop reason: HOSPADM

## 2022-07-12 RX ORDER — FENTANYL CITRATE-0.9 % NACL/PF 10 MCG/ML
100 PLASTIC BAG, INJECTION (ML) INTRAVENOUS EVERY 5 MIN PRN
Status: DISCONTINUED | OUTPATIENT
Start: 2022-07-12 | End: 2022-07-12 | Stop reason: HOSPADM

## 2022-07-12 RX ORDER — MISOPROSTOL 100 UG/1
400 TABLET ORAL
Status: DISCONTINUED | OUTPATIENT
Start: 2022-07-12 | End: 2022-07-13 | Stop reason: HOSPADM

## 2022-07-12 RX ADMIN — LIDOCAINE HYDROCHLORIDE 30 ML: 10 INJECTION, SOLUTION EPIDURAL; INFILTRATION; INTRACAUDAL; PERINEURAL at 14:59

## 2022-07-12 RX ADMIN — LIDOCAINE HYDROCHLORIDE AND EPINEPHRINE 5 ML: 15; 5 INJECTION, SOLUTION EPIDURAL at 12:05

## 2022-07-12 RX ADMIN — SODIUM CITRATE AND CITRIC ACID MONOHYDRATE 30 ML: 500; 334 SOLUTION ORAL at 10:50

## 2022-07-12 RX ADMIN — SODIUM CHLORIDE, SODIUM LACTATE, POTASSIUM CHLORIDE, AND CALCIUM CHLORIDE 1000 ML: 600; 310; 30; 20 INJECTION, SOLUTION INTRAVENOUS at 10:48

## 2022-07-12 RX ADMIN — SODIUM CHLORIDE, POTASSIUM CHLORIDE, SODIUM LACTATE AND CALCIUM CHLORIDE 500 ML: 600; 310; 30; 20 INJECTION, SOLUTION INTRAVENOUS at 10:47

## 2022-07-12 RX ADMIN — Medication 10 ML/HR: at 12:15

## 2022-07-12 RX ADMIN — LIDOCAINE HYDROCHLORIDE 3 ML: 10 INJECTION, SOLUTION EPIDURAL; INFILTRATION; INTRACAUDAL; PERINEURAL at 12:00

## 2022-07-12 RX ADMIN — SODIUM CHLORIDE, POTASSIUM CHLORIDE, SODIUM LACTATE AND CALCIUM CHLORIDE: 600; 310; 30; 20 INJECTION, SOLUTION INTRAVENOUS at 06:27

## 2022-07-12 RX ADMIN — IBUPROFEN 800 MG: 400 TABLET, FILM COATED ORAL at 18:43

## 2022-07-12 RX ADMIN — Medication 2 MILLI-UNITS/MIN: at 06:29

## 2022-07-12 ASSESSMENT — ACTIVITIES OF DAILY LIVING (ADL)
ADLS_ACUITY_SCORE: 22
ADLS_ACUITY_SCORE: 18
ADLS_ACUITY_SCORE: 22
ADLS_ACUITY_SCORE: 18
ADLS_ACUITY_SCORE: 22
ADLS_ACUITY_SCORE: 18
ADLS_ACUITY_SCORE: 18

## 2022-07-12 ASSESSMENT — LIFESTYLE VARIABLES: TOBACCO_USE: 1

## 2022-07-12 NOTE — H&P
[facility]    History and Physical  Obstetrics and Gynecology     Date of Admission:  2022    Assessment & Plan   Alize Fountain is a 26 year old female who presents with term pregnancy for induction of labor  ASSESSMENT:   IUP @ 39w3d for induction of labor.   NST reactive.  Category  I    PLAN:   Admit - see IP orders    Arthur River MD    History of Present Illness   Alize Fountain is a 26 year old female  39w3d  Estimated Date of Delivery: 2022 is calculated from Patient's last menstrual period was 2021. [facility].  Indication elective.    PRENATAL COURSE  Prenatal course was essentially uncomplicated      Recent Labs   Lab Test 22  0543 21  0857 20  0700   ABO  --   --  A   RH  --   --  Pos   AS Negative   < >  --     < > = values in this interval not displayed.     Rhogam not indicated   Recent Labs   Lab Test 21  0857   HEPBANG Nonreactive   HIAGAB Nonreactive   RUQIGG Positive*       Past Medical History    I have reviewed this patient's medical history and updated it with pertinent information if needed.   No past medical history on file.    Past Surgical History   I have reviewed this patient's surgical history and updated it with pertinent information if needed.  Past Surgical History:   Procedure Laterality Date     DILATION AND CURETTAGE SUCTION N/A 2019    Procedure: DILATION AND CURETTAGE SUCTION;  Surgeon: Cody Jenkins MD;  Location:  OR       Prior to Admission Medications   Prior to Admission Medications   Prescriptions Last Dose Informant Patient Reported? Taking?   Prenatal MV-Min-Fe Fum-FA-DHA (PRENATAL 1 PO) 2022 at Unknown time  Yes Yes   aspirin (ASA) 81 MG chewable tablet 2022 at Unknown time  Yes Yes   Sig: Take 81 mg by mouth daily      Facility-Administered Medications: None     Allergies   Allergies   Allergen Reactions     Azithromycin Rash      Gin [Alcohol] Swelling     Eye swelling and itching        Social History   I have reviewed this patient's social history and updated it with pertinent information if needed. Alize Fountain  reports that she quit smoking about 3 weeks ago. Her smoking use included cigarettes. She smoked 0.50 packs per day. She has never used smokeless tobacco. She reports previous alcohol use. She reports that she does not use drugs.    Family History   I have reviewed this patient's family history and updated it with pertinent information if needed.   No family history on file.    Immunization History   Immunizations are up to date    Physical Exam   Temp: 97.2  F (36.2  C) Temp src: Temporal BP: 129/65 Pulse: 103   Resp: 18 SpO2: 99 % O2 Device: None (Room air)    Vital Signs with Ranges  Temp:  [97.2  F (36.2  C)] 97.2  F (36.2  C)  Pulse:  [103] 103  Resp:  [18] 18  BP: (129)/(65) 129/65  SpO2:  [99 %] 99 %    Abdomen: gravid, single vertex fetus, non-tender, EFW 7 lbs 8  Cervical Exam: 3/ 50/ Mid/ soft/ -1     Fetal Heart Tones: 125 baseline, moderate variablility, + accels, no decels and Category I  TOCO:   none    Constitutional: healthy, alert, active and no distress   Respiratory: No increased work of breathing, good air exchange, clear to auscultation bilaterally, no crackles or wheezing  Cardiovascular: Normal apical impulse, regular rate and rhythm, normal S1 and S2, no S3 or S4, and no murmur noted  Skin/Extremites: no rashes and no lesions  Neurologic: A&O x 3

## 2022-07-12 NOTE — ANESTHESIA PROCEDURE NOTES
Epidural catheter Procedure Note    Pre-Procedure   Staff -        CRNA: Miles Gold APRN CRNA       Performed By: CRNA       Location: OB       Procedure Start/Stop Times: 7/12/2022 11:56 AM and 7/12/2022 12:18 PM       Pre-Anesthestic Checklist: patient identified, IV checked, risks and benefits discussed, informed consent, monitors and equipment checked, pre-op evaluation, at physician/surgeon's request and post-op pain management  Timeout:       Correct Patient: Yes        Correct Procedure: Yes        Correct Site: Yes        Correct Position: Yes   Procedure Documentation  Procedure: epidural catheter       Diagnosis: Labor Pain       Patient Position: sitting       Patient Prep/Sterile Barriers: sterile gloves, patient draped       Skin prep: Chloraprep       Local skin infiltrated with 3 mL of 1% lidocaine.        Insertion Site: L3-4. (midline approach).       Technique: LORT air        KRISTIN at 8 cm.       Needle Type: Touhy needle       Needle Gauge: 17.        Needle Length (Inches): 3.5        Catheter: 19 G.          Catheter threaded easily.           Threaded 16 cm at skin.         # of attempts: 3 and  # of redirects:  3    Assessment/Narrative         Paresthesias: No.       Test dose of 5 mL lidocaine 1.5% w/ 1:200,000 epinephrine at 12:05 CDT.         Test dose negative, 3 minutes after injection, for signs of intravascular, subdural, or intrathecal injection.       Insertion/Infusion Method: LORT air       Aspiration negative for Heme or CSF via Epidural Catheter.    Medication(s) Administered   Medication Administration Time: 7/12/2022 11:56 AM

## 2022-07-12 NOTE — PROGRESS NOTES
Up to bathroom tolerated activity well. Voided W/O difficulty, steve care discussed and demonstrated. Settled back in bed.

## 2022-07-12 NOTE — L&D DELIVERY NOTE
OB Vaginal Delivery Note    Alize Fountain MRN# [id number]   Age: 26 year old YOB: 1995       GA: 39w3d  GP:   Labor Complications: None   EBL:   mL  Delivery QBL:    Delivery Type: Vaginal, Spontaneous   ROM to Delivery Time: (Delivered) Hours: 4 Minutes: 12  [place] Weight: 3.779 kg (8 lb 5.3 oz)    1 Minute 5 Minute 10 Minute   Apgar Totals: 8   9             Delivery Details:  Description of Delivery:    Patient is a 26 year old yo G5 now  presenting with induction at 39w3d.  She was induced for elective reasons with Pitocin and progressed in labor with AROM.  Epidural analgesia was successfully placed and Pitocin augmentation given for dysfunctional labor.  She ultimately progressed to complete and pushed atotal of 6 min delivering a liveborn male. Nuccal cord x 1 was noted and reduced.  Anterior shoulder delivered easily with  No maneuvers followed by the remainder of the baby and it was placed on the maternal abdomen. Placenta was delivered 5 minutes later intact with a 3 vessel cord.  A 2nd degree midline perineal laceration was noted.  She was further anesthetized with 1% lidocaine and the lac repaired with 3-0 Vicryl.  The remainder of the birth canal was intact and rectal was negative with normal sphincter tone following the repair. All counts were correct following the repair. No complications occurred. Mom and baby are currently stable.  She is A POSand rubella immune. EBL was less than 500 ml.         Jered Fountain-Alize [[id number]]    Labor Event Times    Labor onset date: 22 Onset time:  6:30 AM   Dilation complete date: 22 Complete time:  2:19 PM   Start pushing date/time: 2022 1419      Labor Length    1st Stage (hrs): 7 (min): 49   2nd Stage (hrs): 0 (min): 6   3rd Stage (hrs): 0 (min): 5      Labor Events     labor?: No   steroids: None  Labor Type: Induction/Cervical ripening  Predominate monitoring during 1st stage: continuous  "electronic fetal monitoring     Antibiotics received during labor?: No     Rupture date/time: 22 1013   Rupture type: Artificial Rupture of Membranes     Induction: Oxytocin, AROM  Induction date/time: 22 0630   Cervical ripening date/time:     Indications for induction: Elective     1:1 continuous labor support provided by?: RN Labor partogram used?: no      Delivery/Placenta Date and Time    Delivery Date: 22 Delivery Time:  2:25 PM   Placenta Date/Time: 2022  2:30 PM  Oxytocin given at the time of delivery: after delivery of baby  Delivering clinician: rAthur River MD          Vaginal Counts     Initial count performed by 2 team members:  Two Team Members   mabel ghotra rn       Needles Suture Needles Sponges (RETIRED) Instruments   Initial counts 1 0 6    Added to count       Relief counts       Final counts 1 1 6          Placed during labor Accounted for at the end of labor   FSE No NA   IUPC No NA   Cervidil No NA              Final count performed by 2 team members:  Two Team Members   mabel gillespie md      Final count correct?: Yes     Apgars    Living status: Living   1 Minute 5 Minute 10 Minute 15 Minute 20 Minute   Skin color: 0  1       Heart rate: 2  2       Reflex irritability: 2  2       Muscle tone: 2  2       Respiratory effort: 2  2       Total: 8  9       Apgars assigned by: MABEL GILLESPIE     Cord    Vessels: 3 Vessels    Cord Complications: Nuchal   Nuchal Intervention: reduced         Nuchal cord description: loose nuchal cord         Cord Blood Disposition: Lab    Gases Sent?: No    Delayed cord clamping?: Yes    Cord Clamping Delay (seconds): 31-60 seconds    Stem cell collection?: No        Resuscitation    Methods: None  Output in Delivery Room: Voided     Heth Measurements    Weight: 8 lb 5.3 oz Length: 1' 9.5\"   Head circumference: 36.2 cm Chest circumference: 34.3 cm   Output in delivery room: Voided     Skin to Skin and Feeding Plan    Skin to skin " initiation date/time: 1/7/1841    Skin to skin with: Mother  Skin to skin end date/time:        Delivery (Maternal) (Provider to Complete) ([id number])    Episiotomy: None  Perineal lacerations: 2nd Repaired?: Yes   Repair suture: 3-0 Vicryl  Genital tract inspection done: Pos     Blood Loss  Mother: Alize Fountain #[id number]   Start of Mother's Information    Delivery Blood Loss  07/12/22 0630 - 07/13/22 1425    Delivery QBL (mL) Hospital Encounter 367 mL    Postpartum QBL (mL) Hospital Encounter 174 mL    Total  541 mL         End of Mother's Information  Mother: Alize Fountain #[id number]          Delivery - Provider to Complete ([id number])    Delivering clinician: Arthur River MD  Attempted Delivery Types (Choose all that apply): Spontaneous Vaginal Delivery  Delivery Type (Choose the 1 that will go to the Birth History): Vaginal, Spontaneous                                 Placenta    Date/Time: 7/12/2022  2:30 PM  Removal: Spontaneous  Disposition: Hospital disposal           Anesthesia    Method: Epidural  Cervical dilation at placement: 4-7                Presentation and Position    Presentation: Vertex    Position: Right Occiput Anterior                 Arthur River MD

## 2022-07-12 NOTE — PROGRESS NOTES
Assessment done, VSS. See flowsheet for further info. Resting in bed, fundus is firm at U, moderate amount of lochia no clots noted. Will continue to monitor.

## 2022-07-12 NOTE — PROGRESS NOTES
Data: Patient admitted to room 405 at 0538. Patient presents for scheduled elective induction.    Gestational age 39w3d. Fetal movement present. Support persons Mani () present.    Robson Sanchez RN on 7/12/2022 at 5:40 AM

## 2022-07-12 NOTE — PROGRESS NOTES
Episodic deceleration at [id number]. Patient became very lightheaded, dizzy, and nauseated from IV start. Mom was supported and baby's heart rate came back up.

## 2022-07-12 NOTE — PROGRESS NOTES
cx now 4-/+1 cephalic  EFM cat 1  Epidural in and working well    Anticipate     Arthur River MD FACOG  1:07 PM 2022

## 2022-07-12 NOTE — PROGRESS NOTES
cx now 3.5/50/-1 to 0 cephalic  AROM, clear  EFM cat 1 130 with mod raiza and accels, no decels    Epidural prn  Continue pitocin per protocol  Anticipate     Arthur River MD FACOG  10:13 AM 2022

## 2022-07-12 NOTE — ANESTHESIA PREPROCEDURE EVALUATION
Anesthesia Pre-Procedure Evaluation    Patient: Alize Fountain   MRN: [id number] : 1995        Procedure : * No procedures listed *          History reviewed. No pertinent past medical history.   Past Surgical History:   Procedure Laterality Date     DILATION AND CURETTAGE SUCTION N/A 2019    Procedure: DILATION AND CURETTAGE SUCTION;  Surgeon: Cody Jenkins MD;  Location: [facility] OR      Allergies   Allergen Reactions     Azithromycin Rash     Gin [Alcohol] Swelling     Eye swelling and itching       Social History     Tobacco Use     Smoking status: Former Smoker     Packs/day: 0.50     Types: Cigarettes     Quit date: 2022     Years since quittin.0     Smokeless tobacco: Never Used     Tobacco comment: 15 a day   Substance Use Topics     Alcohol use: Not Currently     Comment: Rarely       Wt Readings from Last 1 Encounters:   22 126.7 kg (279 lb 4.8 oz)        Anesthesia Evaluation   Pt has had prior anesthetic. Type: Regional.        ROS/MED HX  ENT/Pulmonary:  - neg pulmonary ROS   (+) tobacco use, Past use,     Neurologic:  - neg neurologic ROS     Cardiovascular:  - neg cardiovascular ROS     METS/Exercise Tolerance: >4 METS    Hematologic:  - neg hematologic  ROS     Musculoskeletal:  - neg musculoskeletal ROS     GI/Hepatic:     (+) GERD,     Renal/Genitourinary:  - neg Renal ROS     Endo:  - neg endo ROS     Psychiatric/Substance Use:  - neg psychiatric ROS     Infectious Disease:  - neg infectious disease ROS     Malignancy:  - neg malignancy ROS     Other:            Physical Exam    Airway        Mallampati: II   TM distance: > 3 FB   Neck ROM: full   Mouth opening: > 3 cm    Respiratory Devices and Support         Dental  no notable dental history         Cardiovascular   cardiovascular exam normal          Pulmonary   pulmonary exam normal                OUTSIDE LABS:  CBC:   Lab Results   Component Value Date    WBC 11.0 2022    WBC 11.4 (H) 2022    HGB 11.5  (L) 07/12/2022    HGB 11.4 (L) 03/31/2022    HCT 34.2 (L) 07/12/2022    HCT 33.3 (L) 03/31/2022     07/12/2022     03/31/2022     BMP:   Lab Results   Component Value Date     01/28/2020     06/23/2019    POTASSIUM 3.8 01/28/2020    POTASSIUM 3.7 06/23/2019    CHLORIDE 105 01/28/2020    CHLORIDE 105 06/23/2019    CO2 26 01/28/2020    CO2 25 06/23/2019    BUN 14 01/28/2020    BUN 6 (L) 06/23/2019    CR 0.79 01/28/2020    CR 0.58 (L) 06/23/2019    GLC 91 01/28/2020    GLC 96 06/23/2019     COAGS: No results found for: PTT, INR, FIBR  POC:   Lab Results   Component Value Date    HCG Negative 03/13/2019    HCGS Negative 03/13/2019     HEPATIC:   Lab Results   Component Value Date    ALBUMIN 3.8 01/28/2020    PROTTOTAL 7.1 01/28/2020    ALT 11 01/28/2020    AST 10 (L) 01/28/2020    ALKPHOS 105 (H) 01/28/2020    BILITOTAL 0.3 01/28/2020     OTHER:   Lab Results   Component Value Date    LACT 0.7 10/26/2018    ADRIAN 9.1 01/28/2020    LIPASE 10 (L) 06/23/2019       Anesthesia Plan    ASA Status:  2   NPO Status:  NPO Appropriate    Anesthesia Type: Epidural.              Consents    Anesthesia Plan(s) and associated risks, benefits, and realistic alternatives discussed. Questions answered and patient/representative(s) expressed understanding.     - Discussed: Risks, Benefits and Alternatives for the PROCEDURE were discussed     - Discussed with:  Patient, Spouse      - Extended Intubation/Ventilatory Support Discussed: No.      - Patient is DNR/DNI Status: No    Use of blood products discussed: No .     Postoperative Care            Comments:                ROB SANTOYO CRNA

## 2022-07-12 NOTE — PROGRESS NOTES
Patient delivered a viable baby boy via  at 1425 by . Baby placed to mom's chest. 2nd degree tear with repair. Swati Mcknight RN on 2022 at 2:33 PM

## 2022-07-13 VITALS
RESPIRATION RATE: 16 BRPM | DIASTOLIC BLOOD PRESSURE: 68 MMHG | HEART RATE: 90 BPM | BODY MASS INDEX: 43.1 KG/M2 | TEMPERATURE: 98 F | OXYGEN SATURATION: 98 % | WEIGHT: 279.3 LBS | SYSTOLIC BLOOD PRESSURE: 128 MMHG

## 2022-07-13 LAB — HGB BLD-MCNC: 10.2 G/DL (ref 11.7–15.7)

## 2022-07-13 PROCEDURE — 85018 HEMOGLOBIN: CPT | Performed by: OBSTETRICS & GYNECOLOGY

## 2022-07-13 PROCEDURE — 250N000013 HC RX MED GY IP 250 OP 250 PS 637: Performed by: OBSTETRICS & GYNECOLOGY

## 2022-07-13 PROCEDURE — 36415 COLL VENOUS BLD VENIPUNCTURE: CPT | Performed by: OBSTETRICS & GYNECOLOGY

## 2022-07-13 RX ORDER — OXYCODONE HYDROCHLORIDE 5 MG/1
5 TABLET ORAL EVERY 4 HOURS PRN
Status: DISCONTINUED | OUTPATIENT
Start: 2022-07-13 | End: 2022-07-13 | Stop reason: HOSPADM

## 2022-07-13 RX ORDER — NALOXONE HYDROCHLORIDE 0.4 MG/ML
0.4 INJECTION, SOLUTION INTRAMUSCULAR; INTRAVENOUS; SUBCUTANEOUS
Status: DISCONTINUED | OUTPATIENT
Start: 2022-07-13 | End: 2022-07-13 | Stop reason: HOSPADM

## 2022-07-13 RX ORDER — DOCUSATE SODIUM 100 MG/1
100 CAPSULE, LIQUID FILLED ORAL 2 TIMES DAILY PRN
Qty: 30 CAPSULE | Refills: 0 | Status: SHIPPED | OUTPATIENT
Start: 2022-07-13 | End: 2022-08-08

## 2022-07-13 RX ORDER — CYCLOBENZAPRINE HCL 10 MG
10 TABLET ORAL EVERY 8 HOURS PRN
Status: DISCONTINUED | OUTPATIENT
Start: 2022-07-13 | End: 2022-07-13 | Stop reason: HOSPADM

## 2022-07-13 RX ORDER — NALOXONE HYDROCHLORIDE 0.4 MG/ML
0.2 INJECTION, SOLUTION INTRAMUSCULAR; INTRAVENOUS; SUBCUTANEOUS
Status: DISCONTINUED | OUTPATIENT
Start: 2022-07-13 | End: 2022-07-13 | Stop reason: HOSPADM

## 2022-07-13 RX ORDER — IBUPROFEN 600 MG/1
600 TABLET, FILM COATED ORAL EVERY 6 HOURS PRN
Qty: 30 TABLET | Refills: 0 | Status: SHIPPED | OUTPATIENT
Start: 2022-07-13 | End: 2022-08-08

## 2022-07-13 RX ADMIN — CYCLOBENZAPRINE 10 MG: 10 TABLET, FILM COATED ORAL at 15:00

## 2022-07-13 RX ADMIN — IBUPROFEN 800 MG: 400 TABLET, FILM COATED ORAL at 13:40

## 2022-07-13 RX ADMIN — OXYCODONE HYDROCHLORIDE 5 MG: 5 TABLET ORAL at 15:47

## 2022-07-13 RX ADMIN — ACETAMINOPHEN 650 MG: 325 TABLET, FILM COATED ORAL at 10:11

## 2022-07-13 RX ADMIN — DOCUSATE SODIUM 100 MG: 100 CAPSULE, LIQUID FILLED ORAL at 10:11

## 2022-07-13 RX ADMIN — IBUPROFEN 800 MG: 400 TABLET, FILM COATED ORAL at 07:27

## 2022-07-13 ASSESSMENT — ACTIVITIES OF DAILY LIVING (ADL)
ADLS_ACUITY_SCORE: 18
ADLS_ACUITY_SCORE: 22
ADLS_ACUITY_SCORE: 18
ADLS_ACUITY_SCORE: 18
ADLS_ACUITY_SCORE: 22
ADLS_ACUITY_SCORE: 22
ADLS_ACUITY_SCORE: 18

## 2022-07-13 NOTE — ANESTHESIA POSTPROCEDURE EVALUATION
Patient: Alize Fountain    Procedure: * No procedures listed *       Anesthesia Type:  Epidural    Note:  Disposition: Inpatient   Postop Pain Control: Uneventful            Sign Out: Well controlled pain   PONV: No   Neuro/Psych: Uneventful            Sign Out: Acceptable/Baseline neuro status   Airway/Respiratory: Uneventful            Sign Out: Acceptable/Baseline resp. status   CV/Hemodynamics: Uneventful            Sign Out: Acceptable CV status   Other NRE: NONE   DID A NON-ROUTINE EVENT OCCUR? No    Event details/Postop Comments:  Patient happy with epidural. Up walking, no residual numbness or tingling, voiding without difficulty, denies fevers or chills.             Last vitals:  Vitals:    07/12/22 1840 07/12/22 2258 07/13/22 0827   BP: 102/57 113/57 112/65   Pulse:      Resp:  16 16   Temp: 97.2  F (36.2  C) 97.3  F (36.3  C) 97.2  F (36.2  C)   SpO2:  96%        Electronically Signed By: ROB Sahu CRNA  July 13, 2022  10:29 AM

## 2022-07-13 NOTE — PROGRESS NOTES
Assessment done, VSS. See flowsheet for further info. Ambulating in room indep. Does complaint of a headache and backache. Will medicate per Doctor order and continue to monitor.

## 2022-07-13 NOTE — PROGRESS NOTES
NSG DISCHARGE NOTE    Patient discharged to home at 6:10 PM via ambulation. Accompanied by spouse and staff. Discharge instructions reviewed with patient, opportunity offered to ask questions. Prescriptions sent to patients preferred pharmacy. All belongings sent with patient.    Karina Quiroz RN

## 2022-07-13 NOTE — PLAN OF CARE
VSS, afebrile, denies pain with the exception of some cramping while breastfeeding.  Fundus firm, bleeding light to moderate, with no clots noted.  IND in room, urinating without issue.  Tolerating regular diet.  Breastfeeding going well.  Ofelia Rizvi RN............................ 7/13/2022 4:27 AM

## 2022-07-13 NOTE — PROGRESS NOTES
Patient C/O a head and neck ache. Dr. River updated and orders received for RX. PO meds were given per Doctor order and patient states that she is feeling better. Up to shower at this time. Will continue to monitor.

## 2022-07-13 NOTE — PROGRESS NOTES
[facility]    Post-Partum Progress Note    Assessment & Plan   Assessment:  Post-partum day #1  Normal spontaneous vaginal delivery    Doing well.    Plan:  Pain control measures as needed  Discharge later today    Arthur River MD     Interval History   Doing well.  Pain is well-controlled.  No fevers.  No history of foul-smelling vaginal discharge.  Good appetite.  Denies chest pain, shortness of breath, nausea or vomiting.  Vaginal bleeding is similar to a heavy menstrual flow.  Ambulatory.  Breastfeeding well.    Medications     oxytocin in 0.9% NaCl         docusate sodium  100 mg Oral Daily     Measles, Mumps & Rubella Vac  0.5 mL Subcutaneous Once     Tdap (tetanus-diptheria-acell pertussis)  0.5 mL Intramuscular Once       Physical Exam   Temp: 97.3  F (36.3  C) Temp src: Temporal BP: 113/57 Pulse: 90   Resp: 16 SpO2: 96 % O2 Device: None (Room air)    Vitals:    07/12/22 0548   Weight: 126.7 kg (279 lb 4.8 oz)     Vital Signs with Ranges  Temp:  [96.6  F (35.9  C)-98  F (36.7  C)] 97.3  F (36.3  C)  Pulse:  [90] 90  Resp:  [16] 16  BP: ()/(49-71) 113/57  SpO2:  [96 %-100 %] 96 %  I/O last 3 completed shifts:  In: -   Out: 1016 [Urine:475; Blood:541]    Uterine fundus is firm, non-tender and at the level of the umbilicus  Extremities Non-tender    Data   Recent Labs   Lab Test 07/12/22  0543 12/23/21  0857 01/09/20  0700   ABO  --   --  A   RH  --   --  Pos   AS Negative   < >  --     < > = values in this interval not displayed.     Recent Labs   Lab Test 07/13/22  0632 07/12/22  0543   HGB 10.2* 11.5*     Recent Labs   Lab Test 12/23/21  0857   RUQIGG Positive*

## 2022-07-14 ENCOUNTER — HOSPITAL ENCOUNTER (EMERGENCY)
Facility: OTHER | Age: 27
Discharge: HOME | End: 2022-07-14
Attending: STUDENT IN AN ORGANIZED HEALTH CARE EDUCATION/TRAINING PROGRAM | Admitting: STUDENT IN AN ORGANIZED HEALTH CARE EDUCATION/TRAINING PROGRAM
Payer: COMMERCIAL

## 2022-07-14 VITALS
HEART RATE: 74 BPM | WEIGHT: 277.7 LBS | BODY MASS INDEX: 43.58 KG/M2 | OXYGEN SATURATION: 97 % | HEIGHT: 67 IN | SYSTOLIC BLOOD PRESSURE: 91 MMHG | DIASTOLIC BLOOD PRESSURE: 64 MMHG | TEMPERATURE: 97.3 F | RESPIRATION RATE: 20 BRPM

## 2022-07-14 DIAGNOSIS — M54.2 NECK PAIN: ICD-10-CM

## 2022-07-14 LAB
BASOPHILS # BLD AUTO: 0 10E3/UL (ref 0–0.2)
BASOPHILS NFR BLD AUTO: 1 %
CRP SERPL-MCNC: 27 MG/L
EOSINOPHIL # BLD AUTO: 0.3 10E3/UL (ref 0–0.7)
EOSINOPHIL NFR BLD AUTO: 3 %
ERYTHROCYTE [DISTWIDTH] IN BLOOD BY AUTOMATED COUNT: 14.3 % (ref 10–15)
HCT VFR BLD AUTO: 31.7 % (ref 35–47)
HGB BLD-MCNC: 10.4 G/DL (ref 11.7–15.7)
IMM GRANULOCYTES # BLD: 0 10E3/UL
IMM GRANULOCYTES NFR BLD: 0 %
LYMPHOCYTES # BLD AUTO: 1.8 10E3/UL (ref 0.8–5.3)
LYMPHOCYTES NFR BLD AUTO: 21 %
MCH RBC QN AUTO: 27.8 PG (ref 26.5–33)
MCHC RBC AUTO-ENTMCNC: 32.8 G/DL (ref 31.5–36.5)
MCV RBC AUTO: 85 FL (ref 78–100)
MONOCYTES # BLD AUTO: 0.4 10E3/UL (ref 0–1.3)
MONOCYTES NFR BLD AUTO: 5 %
NEUTROPHILS # BLD AUTO: 6 10E3/UL (ref 1.6–8.3)
NEUTROPHILS NFR BLD AUTO: 70 %
NRBC # BLD AUTO: 0 10E3/UL
NRBC BLD AUTO-RTO: 0 /100
PLATELET # BLD AUTO: 221 10E3/UL (ref 150–450)
RBC # BLD AUTO: 3.74 10E6/UL (ref 3.8–5.2)
WBC # BLD AUTO: 8.5 10E3/UL (ref 4–11)

## 2022-07-14 PROCEDURE — 99283 EMERGENCY DEPT VISIT LOW MDM: CPT | Performed by: STUDENT IN AN ORGANIZED HEALTH CARE EDUCATION/TRAINING PROGRAM

## 2022-07-14 PROCEDURE — 250N000011 HC RX IP 250 OP 636: Performed by: STUDENT IN AN ORGANIZED HEALTH CARE EDUCATION/TRAINING PROGRAM

## 2022-07-14 PROCEDURE — 85025 COMPLETE CBC W/AUTO DIFF WBC: CPT | Performed by: STUDENT IN AN ORGANIZED HEALTH CARE EDUCATION/TRAINING PROGRAM

## 2022-07-14 PROCEDURE — 86140 C-REACTIVE PROTEIN: CPT | Performed by: STUDENT IN AN ORGANIZED HEALTH CARE EDUCATION/TRAINING PROGRAM

## 2022-07-14 PROCEDURE — 96374 THER/PROPH/DIAG INJ IV PUSH: CPT | Performed by: STUDENT IN AN ORGANIZED HEALTH CARE EDUCATION/TRAINING PROGRAM

## 2022-07-14 PROCEDURE — 99284 EMERGENCY DEPT VISIT MOD MDM: CPT | Mod: 25 | Performed by: STUDENT IN AN ORGANIZED HEALTH CARE EDUCATION/TRAINING PROGRAM

## 2022-07-14 PROCEDURE — 96375 TX/PRO/DX INJ NEW DRUG ADDON: CPT | Performed by: STUDENT IN AN ORGANIZED HEALTH CARE EDUCATION/TRAINING PROGRAM

## 2022-07-14 PROCEDURE — 36415 COLL VENOUS BLD VENIPUNCTURE: CPT | Performed by: STUDENT IN AN ORGANIZED HEALTH CARE EDUCATION/TRAINING PROGRAM

## 2022-07-14 RX ORDER — KETOROLAC TROMETHAMINE 30 MG/ML
30 INJECTION, SOLUTION INTRAMUSCULAR; INTRAVENOUS ONCE
Status: COMPLETED | OUTPATIENT
Start: 2022-07-14 | End: 2022-07-14

## 2022-07-14 RX ORDER — HYDROMORPHONE HYDROCHLORIDE 1 MG/ML
0.5 INJECTION, SOLUTION INTRAMUSCULAR; INTRAVENOUS; SUBCUTANEOUS ONCE
Status: COMPLETED | OUTPATIENT
Start: 2022-07-14 | End: 2022-07-14

## 2022-07-14 RX ORDER — ONDANSETRON 4 MG/1
4 TABLET, ORALLY DISINTEGRATING ORAL ONCE
Status: COMPLETED | OUTPATIENT
Start: 2022-07-14 | End: 2022-07-14

## 2022-07-14 RX ORDER — KETOROLAC TROMETHAMINE 10 MG/1
10 TABLET, FILM COATED ORAL EVERY 6 HOURS PRN
Qty: 20 TABLET | Refills: 0 | Status: SHIPPED | OUTPATIENT
Start: 2022-07-14 | End: 2022-08-08

## 2022-07-14 RX ADMIN — KETOROLAC TROMETHAMINE 30 MG: 30 INJECTION, SOLUTION INTRAMUSCULAR at 10:10

## 2022-07-14 RX ADMIN — HYDROMORPHONE HYDROCHLORIDE 0.5 MG: 1 INJECTION, SOLUTION INTRAMUSCULAR; INTRAVENOUS; SUBCUTANEOUS at 10:07

## 2022-07-14 RX ADMIN — ONDANSETRON 4 MG: 4 TABLET, ORALLY DISINTEGRATING ORAL at 09:02

## 2022-07-14 NOTE — ED NOTES
Patient states she had a headache yesterday and around 1700 felt better. Went to bed/sleep and woke up way worse.

## 2022-07-14 NOTE — DISCHARGE INSTRUCTIONS
Use toradol in place of ibuprofen. Always take with food to avoid stomach ulcers. Push fluids to stay well hydrated. Please review attached instructions including reasons to return to the emergency department, including intolerable uncontrolled pain, nausea/vomiting, vision change, new weakness or numbness.    Follow up with PCP if headache is not resolved after the next several days.

## 2022-07-14 NOTE — ED TRIAGE NOTES
Pt complaining of severe neck pain.  Pt recently discharged from hospital, Postpartum day 2.  Did have epidural with delivery.      Triage Assessment     Row Name 07/14/22 0818       Triage Assessment (Adult)    Airway WDL WDL       Respiratory WDL    Respiratory WDL WDL       Skin Circulation/Temperature WDL    Skin Circulation/Temperature WDL WDL       Cardiac WDL    Cardiac WDL WDL       Peripheral/Neurovascular WDL    Peripheral Neurovascular WDL WDL       Cognitive/Neuro/Behavioral WDL    Cognitive/Neuro/Behavioral WDL WDL

## 2022-07-14 NOTE — CONSULTS
"I was asked to see patient by ER MD to evaluate her for spinal headache.  Alize had an epidural placed over 48 hours ago, she was evaluated for spinal headache yesterday but chose conservative treatment.  She went home yesterday but returns today after waking with vague neck/shoulder blade \"stiffness/tightness\".  She has had headache symptoms over the last two or more weeks which were more frontal and left sided.  She is sitting up in bed now, she has not had any nausea or photosensitivity.  She reports taking tylenol/ibuprofen at home and has been drinking fluids with some caffeine.  She feels much better now after a dose of dilaudid.    After explaining blood patches and again covering conservative treatment, she chose to continue with conservative treatment at home.  I agree that this is the best plan considering the vagueness of her symptoms and that her pain has not prevented her from performing normal ADL.  I encouraged her to return if her symptoms worsen or she feels as though a blood patch is something she wishes to pursue.    YUNI BRASHER, ROB CRNA on 7/14/2022 at 11:20 AM    "

## 2022-07-14 NOTE — ED PROVIDER NOTES
History     Chief Complaint   Patient presents with     Neck Pain       Alize Fountain is a 26 year old female who presents with severe neck pain.  Severe neck pain started at 11 AM yesterday while in the hospital 1 day after vaginal delivery where she received an epidural.  Headache was treated with multiple medications in the hospital and ultimately patient felt she was appropriate for discharge home.  Upon arriving at home her neck pain has worsened becoming severe and intolerable.  Pain is located over her posterior cervical neck with radiation down her spine to her lower scapular level as well as radiation into her arms.  Denies any numbness or weakness or issues ambulating.  She has some jitteriness of her vision at times but otherwise denies fever, chills, nausea, vomiting, headache.  Symptoms are worse with sitting up and even worse with standing.  Ibuprofen was last taken approximately 1 hour ago.    Allergies   Allergen Reactions     Azithromycin Rash     Gin [Alcohol] Swelling     Eye swelling and itching        Patient Active Problem List    Diagnosis Date Noted     Normal labor and delivery 2022     Priority: Medium     Encounter for triage in pregnant patient 2022     Priority: Medium     40 weeks gestation of pregnancy 2020     Priority: Medium      (normal spontaneous vaginal delivery) 2020     Priority: Medium     SAB (spontaneous ) 2016     Priority: Medium     HPV vaccine counseling 2016     Priority: Medium     Supervision of normal pregnancy in first trimester 2016     Priority: Medium     Contraceptive management 2015     Priority: Medium     TMJ pain dysfunction syndrome 2014     Priority: Medium     Abdominal pain, generalized 10/14/2009     Priority: Medium     Overview:   IMO Update 10/11       Allergic state 2007     Priority: Medium     Overview:   IMO Update 10/11         History reviewed. No pertinent past  "medical history.    Past Surgical History:   Procedure Laterality Date     DILATION AND CURETTAGE SUCTION N/A 2019    Procedure: DILATION AND CURETTAGE SUCTION;  Surgeon: Cody Jenkins MD;  Location:  OR       History reviewed. No pertinent family history.    Social History     Tobacco Use     Smoking status: Former Smoker     Packs/day: 0.50     Types: Cigarettes     Quit date: 2022     Years since quittin.0     Smokeless tobacco: Never Used     Tobacco comment: 15 a day   Vaping Use     Vaping Use: Never used   Substance Use Topics     Alcohol use: Not Currently     Comment: Rarely      Drug use: Never       Medications:    docusate sodium (COLACE) 100 MG capsule  ibuprofen (ADVIL/MOTRIN) 600 MG tablet  ketorolac (TORADOL) 10 MG tablet  Prenatal MV-Min-Fe Fum-FA-DHA (PRENATAL 1 PO)        Review of Systems: See HPI for pertinent negatives and positives. All other systems reviewed and found to be negative.    Physical Exam   BP 91/64   Pulse 74   Temp 97.3  F (36.3  C) (Temporal)   Resp 20   Ht 1.702 m (5' 7\")   Wt 126 kg (277 lb 11.2 oz)   LMP 2021   SpO2 97%   Breastfeeding Yes   BMI 43.49 kg/m       General: awake, uncomfortable and tearful  HEENT: atraumatic  Respiratory: normal effort, clear to auscultation bilaterally  Cardiovascular: regular rate and rhythm, no murmurs  Abdomen: soft, nondistended, nontender  Extremities: no deformities, edema, or tenderness  Spine: Mild cervical tenderness otherwise remaining spine nontender  Skin: warm, dry, no rashes, 1 cm ecchymotic region over lower back midline epidural site  Neuro: alert, no focal deficits  Psych: appropriate mood and affect    ED Course      ED Course as of 22 1729   Thu 2022   1049 Feeling and looking much better. Awaiting second CRNA consult regarding epidural blood patch appropriateness for concerning moderate to severe postdural puncture headache  equivalent.       Results for orders placed or " performed during the hospital encounter of 07/14/22 (from the past 24 hour(s))   CBC with platelets differential    Narrative    The following orders were created for panel order CBC with platelets differential.  Procedure                               Abnormality         Status                     ---------                               -----------         ------                     CBC with platelets and d...[[id number]]  Abnormal            Final result                 Please view results for these tests on the individual orders.   CRP inflammation   Result Value Ref Range    CRP Inflammation 27.0 (H) <10.0 mg/L   CBC with platelets and differential   Result Value Ref Range    WBC Count 8.5 4.0 - 11.0 10e3/uL    RBC Count 3.74 (L) 3.80 - 5.20 10e6/uL    Hemoglobin 10.4 (L) 11.7 - 15.7 g/dL    Hematocrit 31.7 (L) 35.0 - 47.0 %    MCV 85 78 - 100 fL    MCH 27.8 26.5 - 33.0 pg    MCHC 32.8 31.5 - 36.5 g/dL    RDW 14.3 10.0 - 15.0 %    Platelet Count 221 150 - 450 10e3/uL    % Neutrophils 70 %    % Lymphocytes 21 %    % Monocytes 5 %    % Eosinophils 3 %    % Basophils 1 %    % Immature Granulocytes 0 %    NRBCs per 100 WBC 0 <1 /100    Absolute Neutrophils 6.0 1.6 - 8.3 10e3/uL    Absolute Lymphocytes 1.8 0.8 - 5.3 10e3/uL    Absolute Monocytes 0.4 0.0 - 1.3 10e3/uL    Absolute Eosinophils 0.3 0.0 - 0.7 10e3/uL    Absolute Basophils 0.0 0.0 - 0.2 10e3/uL    Absolute Immature Granulocytes 0.0 <=0.4 10e3/uL    Absolute NRBCs 0.0 10e3/uL       Medications   ondansetron (ZOFRAN ODT) ODT tab 4 mg (4 mg Oral Given 7/14/22 0902)   ketorolac (TORADOL) injection 30 mg (30 mg Intravenous Given 7/14/22 1010)   HYDROmorphone (PF) (DILAUDID) injection 0.5 mg (0.5 mg Intravenous Given 7/14/22 1007)       Assessments & Plan (with Medical Decision Making)     I have reviewed the nursing notes.    26 year old female evaluated for severe midline neck pain 2 days after receiving epidural that is positionally responsive worst when  upright and best when supine.  Besides appearing very uncomfortable her exam was benign including being neurologically intact.  With concern for moderate-severe post dural puncture headache equivalent, CRNA was contacted and consulted with patient who did not feel like she was a candidate for an epidural blood patch.  Patient significantly improved with above medications looking and feeling comfortable.  Medications were run by pharmacy prior to administration.  She was discharged home with a short course of Toradol with ED return precautions.  Patient was comfortable with plan for discharge home.    I have reviewed the findings, diagnosis, plan, and need for any follow up with the patient.    Patient instructions:   Use toradol in place of ibuprofen. Always take with food to avoid stomach ulcers. Push fluids to stay well hydrated. Please review attached instructions including reasons to return to the emergency department, including intolerable uncontrolled pain, nausea/vomiting, vision change, new weakness or numbness.    Follow up with PCP if headache is not resolved after the next several days.    Discharge Medication List as of 7/14/2022 11:54 AM      START taking these medications    Details   ketorolac (TORADOL) 10 MG tablet Take 1 tablet (10 mg) by mouth every 6 hours as needed for moderate pain, Disp-20 tablet, R-0, E-Prescribe             Final diagnoses:   Neck pain       7/14/2022   [facility] AND [facility]     Gagan Boyer MD  07/14/22 8590

## 2022-07-17 ENCOUNTER — HOSPITAL ENCOUNTER (EMERGENCY)
Facility: OTHER | Age: 27
Discharge: HOME | End: 2022-07-17
Attending: EMERGENCY MEDICINE | Admitting: EMERGENCY MEDICINE
Payer: COMMERCIAL

## 2022-07-17 ENCOUNTER — APPOINTMENT (OUTPATIENT)
Dept: CT IMAGING | Facility: OTHER | Age: 27
End: 2022-07-17
Attending: EMERGENCY MEDICINE
Payer: COMMERCIAL

## 2022-07-17 ENCOUNTER — APPOINTMENT (OUTPATIENT)
Dept: GENERAL RADIOLOGY | Facility: OTHER | Age: 27
End: 2022-07-17
Attending: EMERGENCY MEDICINE
Payer: COMMERCIAL

## 2022-07-17 ENCOUNTER — NURSE TRIAGE (OUTPATIENT)
Dept: NURSING | Facility: CLINIC | Age: 27
End: 2022-07-17

## 2022-07-17 VITALS
TEMPERATURE: 98.5 F | HEIGHT: 67 IN | BODY MASS INDEX: 43.16 KG/M2 | SYSTOLIC BLOOD PRESSURE: 155 MMHG | RESPIRATION RATE: 22 BRPM | WEIGHT: 275 LBS | DIASTOLIC BLOOD PRESSURE: 81 MMHG | OXYGEN SATURATION: 97 % | HEART RATE: 48 BPM

## 2022-07-17 DIAGNOSIS — R07.9 CHEST PAIN, UNSPECIFIED TYPE: ICD-10-CM

## 2022-07-17 DIAGNOSIS — R06.02 SHORTNESS OF BREATH: ICD-10-CM

## 2022-07-17 LAB
ALBUMIN SERPL-MCNC: 3.3 G/DL (ref 3.5–5.7)
ALP SERPL-CCNC: 77 U/L (ref 34–104)
ALT SERPL W P-5'-P-CCNC: 10 U/L (ref 7–52)
ANION GAP SERPL CALCULATED.3IONS-SCNC: 7 MMOL/L (ref 3–14)
AST SERPL W P-5'-P-CCNC: 9 U/L (ref 13–39)
BASOPHILS # BLD AUTO: 0 10E3/UL (ref 0–0.2)
BASOPHILS NFR BLD AUTO: 0 %
BILIRUB DIRECT SERPL-MCNC: 0.1 MG/DL (ref 0–0.2)
BILIRUB SERPL-MCNC: 0.3 MG/DL (ref 0.3–1)
BUN SERPL-MCNC: 12 MG/DL (ref 7–25)
CALCIUM SERPL-MCNC: 8.9 MG/DL (ref 8.6–10.3)
CHLORIDE BLD-SCNC: 111 MMOL/L (ref 98–107)
CO2 SERPL-SCNC: 23 MMOL/L (ref 21–31)
CREAT SERPL-MCNC: 0.76 MG/DL (ref 0.6–1.2)
D DIMER PPP FEU-MCNC: 1.72 UG/ML FEU (ref 0–0.5)
EOSINOPHIL # BLD AUTO: 0.2 10E3/UL (ref 0–0.7)
EOSINOPHIL NFR BLD AUTO: 3 %
ERYTHROCYTE [DISTWIDTH] IN BLOOD BY AUTOMATED COUNT: 13.7 % (ref 10–15)
GFR SERPL CREATININE-BSD FRML MDRD: >90 ML/MIN/1.73M2
GLUCOSE BLD-MCNC: 78 MG/DL (ref 70–105)
HCT VFR BLD AUTO: 32 % (ref 35–47)
HGB BLD-MCNC: 10.5 G/DL (ref 11.7–15.7)
IMM GRANULOCYTES # BLD: 0 10E3/UL
IMM GRANULOCYTES NFR BLD: 0 %
LYMPHOCYTES # BLD AUTO: 1.6 10E3/UL (ref 0.8–5.3)
LYMPHOCYTES NFR BLD AUTO: 20 %
MCH RBC QN AUTO: 27.5 PG (ref 26.5–33)
MCHC RBC AUTO-ENTMCNC: 32.8 G/DL (ref 31.5–36.5)
MCV RBC AUTO: 84 FL (ref 78–100)
MONOCYTES # BLD AUTO: 0.3 10E3/UL (ref 0–1.3)
MONOCYTES NFR BLD AUTO: 4 %
NEUTROPHILS # BLD AUTO: 5.6 10E3/UL (ref 1.6–8.3)
NEUTROPHILS NFR BLD AUTO: 73 %
NRBC # BLD AUTO: 0 10E3/UL
NRBC BLD AUTO-RTO: 0 /100
PLATELET # BLD AUTO: 258 10E3/UL (ref 150–450)
POTASSIUM BLD-SCNC: 3.9 MMOL/L (ref 3.5–5.1)
PROT SERPL-MCNC: 6.3 G/DL (ref 6.4–8.9)
RBC # BLD AUTO: 3.82 10E6/UL (ref 3.8–5.2)
SODIUM SERPL-SCNC: 141 MMOL/L (ref 134–144)
TROPONIN I SERPL-MCNC: 16.8 PG/ML (ref 0–34)
WBC # BLD AUTO: 7.8 10E3/UL (ref 4–11)

## 2022-07-17 PROCEDURE — 99284 EMERGENCY DEPT VISIT MOD MDM: CPT | Performed by: EMERGENCY MEDICINE

## 2022-07-17 PROCEDURE — 71275 CT ANGIOGRAPHY CHEST: CPT | Mod: TC

## 2022-07-17 PROCEDURE — 36415 COLL VENOUS BLD VENIPUNCTURE: CPT | Performed by: EMERGENCY MEDICINE

## 2022-07-17 PROCEDURE — 85025 COMPLETE CBC W/AUTO DIFF WBC: CPT | Performed by: EMERGENCY MEDICINE

## 2022-07-17 PROCEDURE — 84484 ASSAY OF TROPONIN QUANT: CPT | Performed by: EMERGENCY MEDICINE

## 2022-07-17 PROCEDURE — 82248 BILIRUBIN DIRECT: CPT | Performed by: EMERGENCY MEDICINE

## 2022-07-17 PROCEDURE — 93010 ELECTROCARDIOGRAM REPORT: CPT | Performed by: INTERNAL MEDICINE

## 2022-07-17 PROCEDURE — 258N000003 HC RX IP 258 OP 636: Performed by: EMERGENCY MEDICINE

## 2022-07-17 PROCEDURE — 250N000013 HC RX MED GY IP 250 OP 250 PS 637: Performed by: EMERGENCY MEDICINE

## 2022-07-17 PROCEDURE — 250N000011 HC RX IP 250 OP 636: Performed by: EMERGENCY MEDICINE

## 2022-07-17 PROCEDURE — 99285 EMERGENCY DEPT VISIT HI MDM: CPT | Mod: 25 | Performed by: EMERGENCY MEDICINE

## 2022-07-17 PROCEDURE — 93005 ELECTROCARDIOGRAM TRACING: CPT | Performed by: EMERGENCY MEDICINE

## 2022-07-17 PROCEDURE — 85379 FIBRIN DEGRADATION QUANT: CPT | Performed by: EMERGENCY MEDICINE

## 2022-07-17 PROCEDURE — 71046 X-RAY EXAM CHEST 2 VIEWS: CPT | Mod: TC

## 2022-07-17 PROCEDURE — 80053 COMPREHEN METABOLIC PANEL: CPT | Performed by: EMERGENCY MEDICINE

## 2022-07-17 RX ORDER — SODIUM CHLORIDE 9 MG/ML
INJECTION, SOLUTION INTRAVENOUS CONTINUOUS
Status: DISCONTINUED | OUTPATIENT
Start: 2022-07-17 | End: 2022-07-17 | Stop reason: HOSPADM

## 2022-07-17 RX ORDER — ACETAMINOPHEN 325 MG/1
975 TABLET ORAL ONCE
Status: COMPLETED | OUTPATIENT
Start: 2022-07-17 | End: 2022-07-17

## 2022-07-17 RX ORDER — IOPAMIDOL 755 MG/ML
95 INJECTION, SOLUTION INTRAVASCULAR ONCE
Status: COMPLETED | OUTPATIENT
Start: 2022-07-17 | End: 2022-07-17

## 2022-07-17 RX ADMIN — IOPAMIDOL 95 ML: 755 INJECTION, SOLUTION INTRAVENOUS at 11:09

## 2022-07-17 RX ADMIN — SODIUM CHLORIDE 1000 ML: 9 INJECTION, SOLUTION INTRAVENOUS at 09:26

## 2022-07-17 RX ADMIN — ACETAMINOPHEN 975 MG: 325 TABLET, FILM COATED ORAL at 09:26

## 2022-07-17 NOTE — TELEPHONE ENCOUNTER
Pt calling with complaint of shortness of breath and slow heart rate.    Alize says that when she was trying to lay down and go to sleep last night, she felt like she had a lot of pressure on her chest and was gasping for air. She also felt that her heart rate was beating very slow and when she checked it, it was 43. She was seen a few days ago on 7/14 in the ED for neck pain and sent home with a short course of toradol. Pt did not have the respiratory or cardiac symptoms at that time. She also is 5 days post partum from the vaginal delivery of her second baby.    Pt says her HR is 53 right now. She says now that she is sitting up and not laying down, the chest pressure is better and she feels like she can breathe a little easier. Does not feel like her heart is having extra or skipped beats. She does currently have a headache but denies vision changes. Also says her ankles are very swollen.    Protocol recommends pt go to ED now (or PCP triage). Pt does not have a [place] PCP (just OB/Gyn provider), and given the fact that she is only 5 days PP and at increase risk for blood clots, advise pt to head to ER. Pt verbalized understanding and says her  will take her and mom will come stay with the children.    Sara Pate RN, BSN  [facility]   Triage Nurse Advisor    COVID 19 Nurse Triage Plan/Patient Instructions    Please be aware that novel coronavirus (COVID-19) may be circulating in the community. If you develop symptoms such as fever, cough, or SOB or if you have concerns about the presence of another infection including coronavirus (COVID-19), please contact your health care provider or visit [email or web address].     Disposition/Instructions    ED Visit recommended. Follow protocol based instructions.     Bring Your Own Device:  Please also bring your smart device(s) (smart phones, tablets, laptops) and their charging cables for your personal use and to communicate with your care team  during your visit.    Thank you for taking steps to prevent the spread of this virus.  o Limit your contact with others.  o Wear a simple mask to cover your cough.  o Wash your hands well and often.    Resources    [facility]: About COVID-19: [email or web address]    CDC: What to Do If You're Sick: www.cdc.gov/coronavirus/2019-ncov/about/steps-when-sick.html    CDC: Ending Home Isolation: www.cdc.gov/coronavirus/2019-ncov/hcp/disposition-in-home-patients.html     CDC: Caring for Someone: www.cdc.gov/coronavirus/2019-ncov/if-you-are-sick/care-for-someone.html     [facility]: Interim Guidance for Hospital Discharge to Home: [email or web address]/hcp/[email or web address]    [facility] clinical trials (COVID-19 research studies): [email or web address]-clinical-trials     Below are the COVID-19 hotlines at the [place] [facility] ([facility]). Interpreters are available.   o For health questions: Call 559-663-3614 or 1-137.286.7414 (7 a.m. to 7 p.m.)  o For questions about schools and childcare: Call 267-258-7750 or 1-568.473.7030 (7 a.m. to 7 p.m.)                         Reason for Disposition    Patient sounds very sick or weak to the triager    Additional Information    Negative: Passed out (i.e., lost consciousness, collapsed and was not responding)    Negative: Shock suspected (e.g., cold/pale/clammy skin, too weak to stand, low BP, rapid pulse)    Negative: Difficult to awaken or acting confused (e.g., disoriented, slurred speech)    Negative: Visible sweat on face or sweat dripping down face    Negative: Unable to walk, or can only walk with assistance (e.g., requires support)    Negative: [1] Received SHOCK from implantable cardiac defibrillator AND [2] persisting symptoms (i.e., palpitations, lightheadedness)    Negative: [1] Dizziness, lightheadedness, or weakness AND [2] heart beating very rapidly (e.g., > 140 / minute)    Negative: [1] Dizziness,  lightheadedness, or weakness AND [2] heart beating very slowly  (e.g., < 50 / minute)    Negative: Sounds like a life-threatening emergency to the triager    Negative: Chest pain    Negative: Difficulty breathing    Negative: Dizziness, lightheadedness, or weakness    Negative: [1] Heart beating very rapidly (e.g., > 140 / minute) AND [2] present now  (Exception: during exercise)    Negative: Heart beating very slowly (e.g., < 50 / minute)  (Exception: athlete)    Negative: New or worsened shortness of breath with activity (dyspnea on exertion)    Protocols used: HEART RATE AND HEARTBEAT [id number]

## 2022-07-17 NOTE — ED PROVIDER NOTES
Emergency Department Provider Note  : 1995 Age: 26 year old Sex: female MRN: [id number]    Chief Complaint   Patient presents with     Chest Pain     Shortness of Breath       Medical Decision Making / Assessment / Plan   26 year old female presenting with chest pain shortness of breath and chest pressure.  Differential includes: GERD, cardiomyopathy, ACS, PE.  We will plan to get a chest x-ray, troponin, dimer and basic labs.    ED Progress:  Basic labs returned unremarkable as well as a troponin.  However her dimer was elevated beyond what would be normal for third trimester.  Plan to get CT PE protocol.  EKG was unremarkable besides bradycardia.  Bedside cardiac ultrasound was difficult secondary to body habitus but generally showed grossly preserved function and no B-lines.    The patient reported her symptoms seem somewhat improved although again made worse when lying flat for the CT.  Fortunately this returned negative for PE, aortic pathology.  It only showed small bilateral pleural effusions which I expect is secondary to some fluid overload status post the recent vaginal delivery.  I think that would also explain the lower extremity swelling.  Remainder of her lab work including LFTs were unremarkable.  Her headache was feeling somewhat better after Tylenol and while in the ED she just complained of her continued neck pain which was unchanged.    I do not have an exact etiology for her symptoms but I believe it is safe for her to be discharged to follow-up primary care.    Final diagnoses:   Chest pain, unspecified type   Shortness of breath       Dilip Marr MD  2022   Emergency Department    Markie Herrmann is a 26 year old female who presents at  8:08 AM with substernal chest pressure associated with shortness of breath.  The symptoms started in the last 24 hours and were progressively worse overnight.  Patient reports being only able to lie flat and sleep for very short periods of  "time and then she wakes up gasping.  She states it feels like her toddler sitting on her chest.  She also feels her heart pounding although the rate has been in the 40s and 50s.  She does not report any episodes of tachycardia.  Denies any prior blood clots such as DVT or PE.  She was on progesterone early on in pregnancy and also on a baby aspirin which they stopped just before delivery.    The patient delivered a baby boy 5 days ago via vaginal delivery and denies any complications with that.  Although she does report she has had some neck pain at the base of her skull and associated headaches since the delivery.  She has been told by providers including a chiropractor that she perhaps has a neck strain from delivery.  They do not think it is a spinal headache.  That is bothering her a little bit today.    She also reports some lower extremity edema, mostly her feet and ankles.  This has been mostly symmetric.    I have reviewed the Medications, Allergies, Past Medical and Surgical History, and Social History in the Epic System and with family.    Review of Systems:  See HPI for pertinent positives and negatives. All other systems reviewed and found to be negative.      Objective   BP: (!) 149/80  Pulse: 54  Temp: 98.5  F (36.9  C)  Resp: 18  Height: 170.2 cm (5' 7\")  Weight: 124.7 kg (275 lb)  SpO2: 98 %    Physical Exam:   General: awake and alert, nursing her baby  Head: normocephalic, atraumatic  Eyes:conjugate gaze  ENT: moist membranes  Chest/Respiratory: normal resp effort, CTAB  Cardiovascular: warm and well perfused  Abdominal: soft, non-distended, non-tender  Extremities: mobility at baseline  Neurological: moving all extremities, normal speech, gait at baseline  Skin: warm and dry  Psychiatric: appropriate affect        Medical/Surgical History:  No past medical history on file.  Past Surgical History:   Procedure Laterality Date     DILATION AND CURETTAGE SUCTION N/A 2/1/2019    Procedure: DILATION AND " CURETTAGE SUCTION;  Surgeon: Cody Jenkins MD;  Location:  OR       Medications:  No current facility-administered medications for this encounter.     Current Outpatient Medications   Medication     docusate sodium (COLACE) 100 MG capsule     ibuprofen (ADVIL/MOTRIN) 600 MG tablet     ketorolac (TORADOL) 10 MG tablet     Prenatal MV-Min-Fe Fum-FA-DHA (PRENATAL 1 PO)       Allergies:  Azithromycin and Gin [alcohol]    Relevant labs, images, EKGs, Epic and outside hospital (if applicable) charts were reviewed. The findings, diagnosis, plan, and need for follow up were discussed with the patient/family. Nursing notes were reviewed.        Dilip Marr MD  07/17/22 0875

## 2022-07-17 NOTE — ED TRIAGE NOTES
Patient has been having chest pressure since last night. She felt like something was sitting on her chest. She was short of breath at the time and her pulse was noted to be in the 40-50s. ECG on arrival reads SB with a heart rate of 50. Recent labor and delivery, breastfeeding. No complications, no clotting or bleeding issues.      Triage Assessment     Row Name 07/17/22 0810       Triage Assessment (Adult)    Airway WDL WDL       Respiratory WDL    Respiratory WDL WDL       Skin Circulation/Temperature WDL    Skin Circulation/Temperature WDL WDL       Cardiac WDL    Cardiac WDL X  chest pressure/pain overnight        Peripheral/Neurovascular WDL    Peripheral Neurovascular WDL WDL       Cognitive/Neuro/Behavioral WDL    Cognitive/Neuro/Behavioral WDL WDL

## 2022-07-18 ENCOUNTER — LACTATION ENCOUNTER (OUTPATIENT)
Age: 27
End: 2022-07-18

## 2022-07-18 ENCOUNTER — HOSPITAL ENCOUNTER (OUTPATIENT)
Dept: OBGYN | Facility: OTHER | Age: 27
Discharge: HOME | End: 2022-07-18
Attending: OBSTETRICS & GYNECOLOGY
Payer: COMMERCIAL

## 2022-07-18 LAB
ATRIAL RATE - MUSE: 50 BPM
DIASTOLIC BLOOD PRESSURE - MUSE: NORMAL MMHG
INTERPRETATION ECG - MUSE: NORMAL
P AXIS - MUSE: 65 DEGREES
PR INTERVAL - MUSE: 144 MS
QRS DURATION - MUSE: 78 MS
QT - MUSE: 434 MS
QTC - MUSE: 395 MS
R AXIS - MUSE: 47 DEGREES
SYSTOLIC BLOOD PRESSURE - MUSE: NORMAL MMHG
T AXIS - MUSE: 27 DEGREES
VENTRICULAR RATE- MUSE: 50 BPM

## 2022-07-18 PROCEDURE — S9443 LACTATION CLASS: HCPCS | Performed by: REGISTERED NURSE

## 2022-07-18 NOTE — LACTATION NOTE
This note was copied from a baby's chart.  Outpatient Lactation Visit    Román Fountain  [id number]    Consultation Date: 2022     Reason for Lactation Referral: Initial Lactation Consult    Baby's : 2022    Baby's Current Age: 6 day old  Baby's Gestational Age: Gestational Age: 39w3d    Primary Care Provider: Jenn Valentin    Presenting Problem (concerns as stated by parent): repeat bilirubin level obtained. Anastacio is continuing to lose weight since appointment on 7/15/2022    MATERNAL HISTORY   History of Breast Surgery: no  Breast Changes During Pregnancy: no  Breast Feeding History: nursed first child for 2 weeks  Maternal Meds: daily prenatal vitamin  Pregnancy Complications: none  Anesthesia during labor: epidural    MATERNAL ASSESSMENT    Breast Size: average, symmetrical, soft after feeding and filling prior to feeding  Nipple Appearance - Left: slightly cracked, with signs of healing, education on further healing techniques provided  Nipple Appearance - Right: slightly cracked, with signs of healing, education on further healing techniques provided  Nipple Erectility - Left: erect with stimulation  Nipple Erectility - Right: erect with stimulation  Areolas Compressibility: soft  Nipple Size: average  Special Equipment Used: 24 mm nipple shield (given today)  Day mother reports milk came in:  Day 4    INFANT ASSESSMENT    Oral Anatomy  Mouth: normal  Palate: normal  Jaw: normal  Tongue: normal  Frenulum: normal   Digital Suck Exam: root    FEEDING   Feeding Time: aggressively for 20 minutes  Position:  cradle  Effort to Latch: awake and alert, latched easily  Duration of Breast Feeding: Right Breast: 5; Left Breast: 15  Results: good breast feed    Volume of Intake:    Birth Weight: 8 lb 5.3 oz    Hospital discharge weight: 8 lb 1.5 oz    Today's Weight 7 lb 9.7 oz    Total Intake: 0.8 oz  Output: 3-4 soil diapers in last 24 hours, 3-4 wet diapers in last 24 hours    LATCH Score:   Latch:  2 - Good Latch  Audible Swallowin - Spontaneous & frequent  Type of Nipple: (Breast/Nipple) 2 - Everted  Comfort: 2 - Soft, Nontender  Hold: 2 - No Assist   Total LATCH Score:  10    FEEDING PLAN    Home Feeding Plan: Continue to feed on demand when  elicits feeding cues with deep latch.  Babe should be eating 8-12 times in a 24 hour period.  Exclusivity explained and encouraged in the early weeks to establish breastfeeding and order in milk supply.  Rooming-in encouraged with explanation of the benefits.  Continue to apply expressed breast milk and Lanolin cream to nipples after feedings for healing and comfort.  Postpartum breastfeeding assessment completed and education provided.  Items included in the education are:     proper positioning and latch    effectiveness of feeding    manual expression    handling and storing breastmilk    maintenance of breastfeeding for the first 6 months    sign/symptoms of infant feeding issues requiring referral to qualified health care provider    LACTATION COMMENTS   Bilirubin level is at 14.4 mg/dl. Babe is continuing to lose weight since clinic visit on 7/15/2022. Instructed mom to continue breastfeeding/pumping every 2-3 hours and to begin supplementing with formula after each breastfeeding session. (formula sent home with parents) Patient's status reported to Dr. Valentin. No new orders received. Patient will return on  for an appointment with Dr. Valentin.  Deep latch explained for proper positioning of breast in infant's mouth, maximizing milk transfer and comfort.  Reassurance and encouragement provided in regard to mom's concerns about milk supply.  Follow-up support information provided.  Parents plan to keep  Well-Child Check with Dr. Valentin as scheduled for 2 week well child check.      Face-to-face Time: 60 minutes with assessment and education.    Michelle Franco RN  2022  10:28 AM

## 2022-07-22 ENCOUNTER — MEDICAL CORRESPONDENCE (OUTPATIENT)
Dept: HEALTH INFORMATION MANAGEMENT | Facility: OTHER | Age: 27
End: 2022-07-22

## 2022-08-08 ENCOUNTER — OFFICE VISIT (OUTPATIENT)
Dept: FAMILY MEDICINE | Facility: OTHER | Age: 27
End: 2022-08-08
Attending: FAMILY MEDICINE
Payer: COMMERCIAL

## 2022-08-08 VITALS
BODY MASS INDEX: 41.38 KG/M2 | RESPIRATION RATE: 18 BRPM | WEIGHT: 264.2 LBS | DIASTOLIC BLOOD PRESSURE: 72 MMHG | TEMPERATURE: 99.1 F | HEART RATE: 84 BPM | SYSTOLIC BLOOD PRESSURE: 116 MMHG | OXYGEN SATURATION: 97 %

## 2022-08-08 DIAGNOSIS — E66.813 CLASS 3 SEVERE OBESITY DUE TO EXCESS CALORIES WITHOUT SERIOUS COMORBIDITY WITH BODY MASS INDEX (BMI) OF 40.0 TO 44.9 IN ADULT (H): ICD-10-CM

## 2022-08-08 DIAGNOSIS — O92.79 INTERRUPTION IN BREAST FEEDING: ICD-10-CM

## 2022-08-08 DIAGNOSIS — E66.01 CLASS 3 SEVERE OBESITY DUE TO EXCESS CALORIES WITHOUT SERIOUS COMORBIDITY WITH BODY MASS INDEX (BMI) OF 40.0 TO 44.9 IN ADULT (H): ICD-10-CM

## 2022-08-08 DIAGNOSIS — Q24.8 PERICARDIAL CYST ALONG RIGHT CARDIOPHRENIC ANGLE: ICD-10-CM

## 2022-08-08 DIAGNOSIS — L72.3 SEBACEOUS CYST: ICD-10-CM

## 2022-08-08 PROCEDURE — 99214 OFFICE O/P EST MOD 30 MIN: CPT | Performed by: FAMILY MEDICINE

## 2022-08-08 ASSESSMENT — PAIN SCALES - GENERAL: PAINLEVEL: NO PAIN (0)

## 2022-08-08 NOTE — NURSING NOTE
"Chief Complaint   Patient presents with     RECHECK     ER F/U chest pain, SOB   Patient present for an ER f/U for chest pain, SOB; also states that today she has clogged ducts - trying to quit breast feeding    Initial /72 (BP Location: Right arm, Patient Position: Sitting, Cuff Size: Adult Large)   Pulse 84   Temp 99.1  F (37.3  C) (Tympanic)   Resp 18   Wt 119.8 kg (264 lb 3.2 oz)   LMP 09/26/2021   SpO2 97%   BMI 41.38 kg/m   Estimated body mass index is 41.38 kg/m  as calculated from the following:    Height as of 7/17/22: 1.702 m (5' 7\").    Weight as of this encounter: 119.8 kg (264 lb 3.2 oz).     Medication Reconciliation: complete      FOOD SECURITY SCREENING QUESTIONS:    The next two questions are to help us understand your food security.  If you are feeling you need any assistance in this area, we have resources available to support you today.    Hunger Vital Signs:  Within the past 12 months we worried whether our food would run out before we got money to buy more. Never  Within the past 12 months the food we bought just didn't last and we didn't have money to get more. Never      Advance care plan reviewed      Aixa Jimenez LPN on 8/8/2022 at 11:06 AM      "

## 2022-08-08 NOTE — PROGRESS NOTES
"  Assessment & Plan       ICD-10-CM    1. Preeclampsia in postpartum period  O14.95    2. Pericardial cyst along right cardiophrenic angle  Q24.8 Adult Cardiology Eval Count includes the [facility] Referral     Echocardiogram Complete   3. Sebaceous cyst  L72.3    4. Class 3 severe obesity due to excess calories without serious comorbidity with body mass index (BMI) of 40.0 to 44.9 in adult (H)  E66.01     Z68.41    5. Interruption in breast feeding  O92.79      1.  Discussed clinical findings that would be consistent with her having had ppPre-E  Discussed risk for recurrence with future pregnancies.  She does already take ASA with pregnancies due to history of recurrent fetal loss.  Discussed risk of vascular disease, hypertension with Pre-E history.    Diabetes prevention, lipid monitoring, wt management, no smoking, limited alcohol consumption would be additional factors to decrease this risk.  2.  Appointment with cardiology and probable echocardiogram to assess findings of pericardial cyst.  3.  Patient will make a follow-up visit to have the cyst on her back removed.  4.  Currently no signs of mastitis on exam - discussed if she should get T of 100.4 or greater, breast redness, flu like symptoms she should be seen.          BMI:   Estimated body mass index is 41.38 kg/m  as calculated from the following:    Height as of 7/17/22: 1.702 m (5' 7\").    Weight as of this encounter: 119.8 kg (264 lb 3.2 oz).           Return in about 2 weeks (around 8/22/2022) for PP exam.    KASEY FLYNN MD  [facility]    Nursing Notes:   Aixa Jimenez LPN  8/8/2022 11:17 AM  Signed  Chief Complaint   Patient presents with     RECHECK     ER F/U chest pain, SOB   Patient present for an ER f/U for chest pain, SOB; also states that today she has clogged ducts - trying to quit breast feeding    Initial /72 (BP Location: Right arm, Patient Position: Sitting, Cuff Size: Adult Large)   Pulse 84   Temp 99.1  F " "(37.3  C) (Tympanic)   Resp 18   Wt 119.8 kg (264 lb 3.2 oz)   LMP 2021   SpO2 97%   BMI 41.38 kg/m   Estimated body mass index is 41.38 kg/m  as calculated from the following:    Height as of 22: 1.702 m (5' 7\").    Weight as of this encounter: 119.8 kg (264 lb 3.2 oz).     Medication Reconciliation: complete      FOOD SECURITY SCREENING QUESTIONS:    The next two questions are to help us understand your food security.  If you are feeling you need any assistance in this area, we have resources available to support you today.    Hunger Vital Signs:  Within the past 12 months we worried whether our food would run out before we got money to buy more. Never  Within the past 12 months the food we bought just didn't last and we didn't have money to get more. Never      Advance care plan reviewed      Aixa Jimenez LPN on 2022 at 11:06 AM        Markie Herrmann is a 26 year old , presenting for the following health issues:  RECHECK (ER F/U chest pain, SOB)    Patient states that when reading AVS from ER it states that she had a cyst on her heart; 2.5 cm - patient would like to discuss today    Alize Fountain is a 26 year old female is a  at 39w3d.  She is about 3 week PP.    The ER visits from July are reviewed.  This was for neck pain and shortness of breath.  D-dimer was elevated.    When she was in the ER, her vision was weird - the curtain seemed to be moving.    She had significant LE edema - this went down   CT showed:  FINDINGS:   Lungs: Unremarkable. No consolidation. No masses.   Pleural spaces: Small bilateral pleural effusions..   Heart: Right pericardial cyst 3.2 by 2.5 cm   Lymph nodes: Unremarkable. No enlarged lymph nodes.   Vasculature: No evidence of pulmonary embolus to the segmental level. No   aneurysm of the aorta. No dissection of the aorta.      Bones/joints: Unremarkable. No acute fracture.   Soft tissues: Unremarkable.                                               "                         IMPRESSION:   1. No evidence of pulmonary embolus to the segmental level.   2. No aneurysm of the aorta.   3. No dissection of the aorta.   4. Small bilateral pleural effusions..     Blood pressures were elevated in the emergency department.  Her heart rate was in the 40s, this was well below her baseline.  EKG was obtained, this showed sinus bradycardia.    History of Present Illness       Reason for visit:  Follow up from ER visit 7/17  Symptom onset:  3-4 weeks ago  Symptoms include:  Low heart rate, high blood pressure, right pericardial cyst  Symptom intensity:  Mild  Symptom progression:  Improving  Had these symptoms before:  No  What makes it worse:  No  What makes it better:  No    She eats 2-3 servings of fruits and vegetables daily.She consumes 1 sweetened beverage(s) daily.She exercises with enough effort to increase her heart rate 20 to 29 minutes per day.  She exercises with enough effort to increase her heart rate 7 days per week.   She is taking medications regularly.       ED/UC Followup:    Facility:  [facility]  Date of visit: 7/17/22  Reason for visit: Chest pain, SOB  Current Status: Breathing seems to be back to normal.  She is discontinuing breast-feeding.  She states she has some clogged milk ducts.  No redness of her breast.  She has a low-grade temp today and was concerned about infection.    Current Outpatient Medications   Medication     docusate sodium (COLACE) 100 MG capsule     ibuprofen (ADVIL/MOTRIN) 600 MG tablet     ketorolac (TORADOL) 10 MG tablet     Prenatal MV-Min-Fe Fum-FA-DHA (PRENATAL 1 PO)     No current facility-administered medications for this visit.     Past Medical History:   Diagnosis Date     Preeclampsia in postpartum period 2022     Recurrent pregnancy loss          Review of Systems         Objective    /72 (BP Location: Right arm, Patient Position: Sitting, Cuff Size: Adult Large)   Pulse 84   Temp 99.1  F (37.3  C) (Tympanic)   Resp 18    Wt 119.8 kg (264 lb 3.2 oz)   LMP 09/26/2021   SpO2 97%   Breastfeeding Yes   BMI 41.38 kg/m    Body mass index is 41.38 kg/m .  Physical Exam   GENERAL: healthy, alert and no distress  RESP: lungs clear to auscultation - no rales, rhonchi or wheezes  BREAST: No redness of her breasts.  She is engorged, tender  CV: regular rates and rhythm and no peripheral edema  DERM: Sebaceous cyst with overlying scar mid back.  Inferior to her left clavicle is 4 mm raised papule                    .  ..

## 2022-08-22 ENCOUNTER — PRENATAL OFFICE VISIT (OUTPATIENT)
Dept: OBGYN | Facility: OTHER | Age: 27
End: 2022-08-22
Attending: OBSTETRICS & GYNECOLOGY
Payer: COMMERCIAL

## 2022-08-22 VITALS
BODY MASS INDEX: 41.76 KG/M2 | DIASTOLIC BLOOD PRESSURE: 70 MMHG | WEIGHT: 266.6 LBS | SYSTOLIC BLOOD PRESSURE: 120 MMHG | HEART RATE: 82 BPM

## 2022-08-22 PROCEDURE — 99207 PR POST-PARTUM 6 WK VISIT - GICH ONLY: CPT | Performed by: OBSTETRICS & GYNECOLOGY

## 2022-08-22 ASSESSMENT — EDINBURGH POSTNATAL DEPRESSION SCALE (EPDS)
I HAVE FELT SCARED OR PANICKY FOR NO GOOD REASON: NO, NOT AT ALL
I HAVE BEEN ABLE TO LAUGH AND SEE THE FUNNY SIDE OF THINGS: AS MUCH AS I ALWAYS COULD
TOTAL SCORE: 0
I HAVE FELT SAD OR MISERABLE: NO, NOT AT ALL
I HAVE LOOKED FORWARD WITH ENJOYMENT TO THINGS: AS MUCH AS I EVER DID
I HAVE BEEN SO UNHAPPY THAT I HAVE HAD DIFFICULTY SLEEPING: NOT AT ALL
I HAVE BEEN ANXIOUS OR WORRIED FOR NO GOOD REASON: NO, NOT AT ALL
I HAVE FELT SCARED OR PANICKY FOR NO GOOD REASON: NO, NOT AT ALL
THE THOUGHT OF HARMING MYSELF HAS OCCURRED TO ME: NEVER
I HAVE FELT SAD OR MISERABLE: NO, NOT AT ALL
I HAVE BEEN ANXIOUS OR WORRIED FOR NO GOOD REASON: NO, NOT AT ALL
I HAVE BEEN ABLE TO LAUGH AND SEE THE FUNNY SIDE OF THINGS: AS MUCH AS I ALWAYS COULD
I HAVE BEEN SO UNHAPPY THAT I HAVE HAD DIFFICULTY SLEEPING: NOT AT ALL
I HAVE BEEN SO UNHAPPY THAT I HAVE BEEN CRYING: NO, NEVER
I HAVE BLAMED MYSELF UNNECESSARILY WHEN THINGS WENT WRONG: NO, NEVER
THINGS HAVE BEEN GETTING ON TOP OF ME: NO, I HAVE BEEN COPING AS WELL AS EVER
I HAVE LOOKED FORWARD WITH ENJOYMENT TO THINGS: AS MUCH AS I EVER DID
I HAVE BLAMED MYSELF UNNECESSARILY WHEN THINGS WENT WRONG: NO, NEVER
THINGS HAVE BEEN GETTING ON TOP OF ME: NO, I HAVE BEEN COPING AS WELL AS EVER
THE THOUGHT OF HARMING MYSELF HAS OCCURRED TO ME: NEVER
I HAVE BEEN SO UNHAPPY THAT I HAVE BEEN CRYING: NO, NEVER

## 2022-08-22 ASSESSMENT — ANXIETY QUESTIONNAIRES
6. BECOMING EASILY ANNOYED OR IRRITABLE: NOT AT ALL
7. FEELING AFRAID AS IF SOMETHING AWFUL MIGHT HAPPEN: NOT AT ALL
GAD7 TOTAL SCORE: 0
GAD7 TOTAL SCORE: 0
1. FEELING NERVOUS, ANXIOUS, OR ON EDGE: NOT AT ALL
7. FEELING AFRAID AS IF SOMETHING AWFUL MIGHT HAPPEN: NOT AT ALL
4. TROUBLE RELAXING: NOT AT ALL
5. BEING SO RESTLESS THAT IT IS HARD TO SIT STILL: NOT AT ALL
GAD7 TOTAL SCORE: 0
2. NOT BEING ABLE TO STOP OR CONTROL WORRYING: NOT AT ALL
3. WORRYING TOO MUCH ABOUT DIFFERENT THINGS: NOT AT ALL

## 2022-08-22 ASSESSMENT — PAIN SCALES - GENERAL: PAINLEVEL: NO PAIN (0)

## 2022-08-22 NOTE — NURSING NOTE
"Chief Complaint   Patient presents with     Postpartum Care     6 week post partum     Patient presents for 6 week post partum. Patient plans to use condoms for birth control and does not want any other form of birth control   Initial /70   Pulse 82   Wt 120.9 kg (266 lb 9.6 oz)   LMP 09/26/2021   BMI 41.76 kg/m   Estimated body mass index is 41.76 kg/m  as calculated from the following:    Height as of 7/17/22: 1.702 m (5' 7\").    Weight as of this encounter: 120.9 kg (266 lb 9.6 oz).  Medication Reconciliation: complete        Karime Dockery LPN  "

## 2022-08-22 NOTE — PROGRESS NOTES
CC: postpartum exam at 6 weeks from delivery    HPI: Alize Fountain presents for postpartum exam. Baby was born by vaginal delivery. Complications included postpartum hypertension.  Mood:OK    Breastfeeding: bottle  Incision(s): no  Bleeding: no  Birthcontrol: declines    Past Medical History:   Diagnosis Date     Preeclampsia in postpartum period 2022     Recurrent pregnancy loss      Past Surgical History:   Procedure Laterality Date     DILATION AND CURETTAGE SUCTION N/A 02/01/2019    Surgeon: Cody Jenkins MD;  Location:  OR     No current outpatient medications on file.     No current facility-administered medications for this visit.      Allergies   Allergen Reactions     Azithromycin Rash     Gin [Alcohol] Swelling     Eye swelling and itching        REVIEW OF SYSTEMS:  Neg for bowel, bladder, and breast    O: /70   Pulse 82   Wt 120.9 kg (266 lb 9.6 oz)   LMP 09/26/2021   BMI 41.76 kg/m    Body mass index is 41.76 kg/m .    Exam:  Constitutional: healthy, alert, active and no distress    Ozan Depression Scale  Thoughts of Harming Self: Never  Total Score: 0    No results found for any visits on 08/22/22.    I/P:  Postpartum visit.    Resume annual preventive healthcare. Continue PNV's until done with childbearing.    RTC prn    Recheck in a year, consider monthly progestin withdrawal for PCOS and irregular cycles    Arthur River MD FACOG  10:02 AM 8/22/2022       Answers for HPI/ROS submitted by the patient on 8/22/2022  JYOTI 7 TOTAL SCORE: 0

## 2022-09-07 ENCOUNTER — HOSPITAL ENCOUNTER (OUTPATIENT)
Dept: CARDIOLOGY | Facility: OTHER | Age: 27
Discharge: HOME | End: 2022-09-07
Attending: FAMILY MEDICINE | Admitting: FAMILY MEDICINE
Payer: COMMERCIAL

## 2022-09-07 DIAGNOSIS — Q24.8 PERICARDIAL CYST ALONG RIGHT CARDIOPHRENIC ANGLE: ICD-10-CM

## 2022-09-07 LAB — LVEF ECHO: NORMAL

## 2022-09-07 PROCEDURE — 93306 TTE W/DOPPLER COMPLETE: CPT

## 2022-09-07 PROCEDURE — 93306 TTE W/DOPPLER COMPLETE: CPT | Mod: 26 | Performed by: INTERNAL MEDICINE

## 2022-09-08 ENCOUNTER — OFFICE VISIT (OUTPATIENT)
Dept: CARDIOLOGY | Facility: OTHER | Age: 27
End: 2022-09-08
Attending: FAMILY MEDICINE
Payer: COMMERCIAL

## 2022-09-08 VITALS
SYSTOLIC BLOOD PRESSURE: 126 MMHG | RESPIRATION RATE: 18 BRPM | WEIGHT: 270 LBS | OXYGEN SATURATION: 98 % | TEMPERATURE: 98.1 F | HEART RATE: 73 BPM | BODY MASS INDEX: 42.38 KG/M2 | DIASTOLIC BLOOD PRESSURE: 80 MMHG | HEIGHT: 67 IN

## 2022-09-08 DIAGNOSIS — Q24.8 PERICARDIAL CYST ALONG RIGHT CARDIOPHRENIC ANGLE: ICD-10-CM

## 2022-09-08 PROCEDURE — 93000 ELECTROCARDIOGRAM COMPLETE: CPT | Performed by: INTERNAL MEDICINE

## 2022-09-08 PROCEDURE — 99204 OFFICE O/P NEW MOD 45 MIN: CPT | Performed by: INTERNAL MEDICINE

## 2022-09-08 ASSESSMENT — PAIN SCALES - GENERAL: PAINLEVEL: NO PAIN (0)

## 2022-09-08 NOTE — PROGRESS NOTES
[facility] HEART CARE   CARDIOLOGY CONSULT     Alize Fountain   1995  [id number]    Iman Schneider     Chief Complaint   Patient presents with     Consult For     Pericardial cyst along right cardiophrenic angle          HPI:   Mrs. Fountain is a 26-year-old female who is being seen by cardiology for pericardial cyst initially identified on 7/17/2022 on CT scan.  The cyst was measured at 3.2 x 2.5 cm.    She had present to the ER on 7/17/2022 after having had her son x5 days earlier.  She had chest pressure with shortness of breath.  She was having hard time laying flat and was sleeping only for short periods of time.  She would wake up gasping for air.  She described having discomfort under her chest much like a toddler sitting there.  Her heart was pounding with heart rates in the 40s-50s.  Labs are described as being normal except for an elevated D-dimer.  This resulted in a CT scan for PE.    CT scan of her chest for PE completed 7/17/2022 was negative for PE.  She was found of a pericardial cyst at 3.2 x 2.5 cm.  She did not have a pulmonary embolus.  There was no aneurysm of the aorta, dissection, but she has noted small bilateral pleural effusions.    She would not have an echocardiogram.  The echocardiogram showed evidence of pericardial cyst with recommendation for MRI.  As result, she was referred to cardiology.        IMAGING RESULTS:   Echocardiogram in 9/7/2022:  Global and regional left ventricular function is normal with an EF of 55-60%.  Right ventricular function, chamber size, wall motion, and thickness are normal.  Pulmonary artery systolic pressure is normal.  The inferior vena cava is normal.  No pericardial effusion is present.  There is no prior study for direct comparison.  For pericardial cyst, cardiac MRI will be the best imaging modality.    CT chest for pulmonary embolism on 7/17/2022:  Lungs: Unremarkable. No consolidation. No masses.   Pleural spaces: Small bilateral  pleural effusions..   Heart: Right pericardial cyst 3.2 by 2.5 cm   Lymph nodes: Unremarkable. No enlarged lymph nodes.   Vasculature: No evidence of pulmonary embolus to the segmental level. No   aneurysm of the aorta. No dissection of the aorta.      Bones/joints: Unremarkable. No acute fracture.   Soft tissues: Unremarkable.                                                                       IMPRESSION:   1. No evidence of pulmonary embolus to the segmental level.   2. No aneurysm of the aorta.   3. No dissection of the aorta.   4. Small bilateral pleural effusions..     CURRENT MEDICATIONS:   Prior to Admission medications    Not on File       ALLERGIES:   Allergies   Allergen Reactions     Azithromycin Rash     Gin [Alcohol] Swelling     Eye swelling and itching         PAST MEDICAL HISTORY:   Past Medical History:   Diagnosis Date     Pericardial cyst      Preeclampsia in postpartum period      Recurrent pregnancy loss         PAST SURGICAL HISTORY:   Past Surgical History:   Procedure Laterality Date     DILATION AND CURETTAGE SUCTION N/A 2019    Surgeon: Cody Jenkins MD;  Location:  OR        FAMILY HISTORY:   Family History   Problem Relation Age of Onset     Thyroid nodules Mother      Dysfunctional uterine bleeding Mother      Colon Polyps Mother      Hyperlipidemia Father      Hypertension Father      Diabetes Paternal Grandfather      Cerebrovascular Disease Paternal Grandfather      Myocardial Infarction Paternal Grandfather         SOCIAL HISTORY:   Social History     Socioeconomic History     Marital status:      Number of children: 2   Occupational History     Employer: OTHER   Tobacco Use     Smoking status: Former Smoker     Packs/day: 0.50     Types: Cigarettes     Quit date: 2022     Years since quittin.2     Smokeless tobacco: Never Used     Tobacco comment: 15 a day   Vaping Use     Vaping Use: Never used   Substance and Sexual Activity     Alcohol use: Not  Currently     Comment: Rarely      Drug use: Never     Sexual activity: Yes     Partners: Male   Social History Narrative    2 children - Rogers and Román Singleton          ROS:   CONSTITUTIONAL: No weight loss, fever, chills, weakness or fatigue.   HEENT: Eyes: No visual changes. Ears, Nose, Throat: No hearing loss, congestion or difficulty swallowing.   CARDIOVASCULAR: No chest pain, chest pressure or chest discomfort. No palpitations or lower extremity edema.   RESPIRATORY: No shortness of breath, dyspnea upon exertion, cough or sputum production.   GASTROINTESTINAL: No abdominal pain. No anorexia, nausea, vomiting or diarrhea.   NEUROLOGICAL: No headache, lightheadedness, dizziness, syncope, ataxia or weakness.   HEMATOLOGIC: No anemia, bleeding or bruising.   PSYCHIATRIC: No history of depression or anxiety.   ENDOCRINOLOGIC: No reports of sweating, cold or heat intolerance. No polyuria or polydipsia.   SKIN: No abnormal rashes or itching.       PHYSICAL EXAM:   GENERAL: The patient is a well-developed, well-nourished, in no apparent distress. Alert and oriented x3.   HEENT: Head is normocephalic and atraumatic. Eyes are symmetrical with normal visual tracking.  HEART: Regular rate and rhythm, S1S2 present without murmur, rub or gallop.   LUNGS: Respirations regular and unlabored. Clear to auscultation.   EXTREMITIES: No peripheral edema present.   NEUROLOGIC: Alert and oriented X3.    SKIN: No jaundice. No rashes or visible skin lesions present.        LAB RESULTS:   No visits with results within 2 Month(s) from this visit.   Latest known visit with results is:   Prenatal Office Visit on 06/21/2022   Component Date Value Ref Range Status     Group B Strep PCR 06/21/2022 Negative  Negative Final          ASSESSMENT:       ICD-10-CM    1. Pericardial cyst along right cardiophrenic angle  Q24.8 Adult Cardiology Eval [facility] Referral     EKG 12-lead, tracing only         PLAN:   1.  Pericardial cyst: Initially  identified on CT scan on 7/17/2022.  This measured 3.2 cm x 2.5 cm.  Findings were confirmed on echo from 9/7/2022.  Cardiac MRI recommended.  Discussed today.  Patient states she is busy with her new son.  Not willing to do cardiac MRI today but will let us know potentially after the first of the year.  It was explained to her that this would likely need to be done at the [facility] as we do not do cardiac MRIs here locally.  Pericardial cysts are generally benign and well-tolerated.  I generally do not require treatment unless affecting surrounding organs.  He would have to be surgically removed as they recur with drainage.  If found to have issues after cardiac MRI, would refer to CT surgery for potential removal.  Otherwise, you could continue to follow this abnormality.  2.  She will let us know when she is ready to complete the cardiac MRI and it will be ordered.  We will see her in follow-up after completion of the cardiac MRI.      Thank you for allowing me to participate in the care of your patient. Please do not hesitate to contact me if you have any questions.     Farhad Saunders, DO

## 2022-09-08 NOTE — NURSING NOTE
"Patient comes in for consult for Pericardial cyst along right cardiophrenic angle.  Maureen Olmstead LPN ....................9/8/2022   12:51 PM  Chief Complaint   Patient presents with     Consult For     Pericardial cyst along right cardiophrenic angle       Initial /80 (BP Location: Right arm, Patient Position: Sitting, Cuff Size: Adult Large)   Pulse 73   Temp 98.1  F (36.7  C) (Temporal)   Resp 18   Ht 1.702 m (5' 7\")   Wt 122.5 kg (270 lb)   LMP 09/26/2021   SpO2 98%   BMI 42.29 kg/m   Estimated body mass index is 42.29 kg/m  as calculated from the following:    Height as of this encounter: 1.702 m (5' 7\").    Weight as of this encounter: 122.5 kg (270 lb).  Meds Reconciled: complete  Pt is not on Aspirin  Pt is not on a Statin  PHQ and/or JYOTI reviewed. Pt referred to PCP/MH Provider as appropriate.    Maureen Olmstead LPN  FOOD SECURITY SCREENING QUESTIONS  Hunger Vital Signs:  Within the past 12 months we worried whether our food would run out before we got money to buy more. Never  Within the past 12 months the food we bought just didn't last and we didn't have money to get more. Never  Maureen Olmstead LPN 9/8/2022 12:51 PM      "

## 2022-09-09 LAB
ATRIAL RATE - MUSE: 68 BPM
DIASTOLIC BLOOD PRESSURE - MUSE: NORMAL MMHG
INTERPRETATION ECG - MUSE: NORMAL
P AXIS - MUSE: 58 DEGREES
PR INTERVAL - MUSE: 156 MS
QRS DURATION - MUSE: 86 MS
QT - MUSE: 410 MS
QTC - MUSE: 435 MS
R AXIS - MUSE: 60 DEGREES
SYSTOLIC BLOOD PRESSURE - MUSE: NORMAL MMHG
T AXIS - MUSE: 36 DEGREES
VENTRICULAR RATE- MUSE: 68 BPM

## 2022-09-14 ENCOUNTER — HOSPITAL ENCOUNTER (EMERGENCY)
Facility: OTHER | Age: 27
Discharge: HOME | End: 2022-09-14
Attending: PHYSICIAN ASSISTANT | Admitting: PHYSICIAN ASSISTANT
Payer: COMMERCIAL

## 2022-09-14 VITALS
WEIGHT: 271 LBS | OXYGEN SATURATION: 95 % | HEART RATE: 107 BPM | SYSTOLIC BLOOD PRESSURE: 127 MMHG | HEIGHT: 67 IN | TEMPERATURE: 100.4 F | DIASTOLIC BLOOD PRESSURE: 74 MMHG | RESPIRATION RATE: 20 BRPM | BODY MASS INDEX: 42.53 KG/M2

## 2022-09-14 DIAGNOSIS — U07.1 INFECTION DUE TO 2019 NOVEL CORONAVIRUS: ICD-10-CM

## 2022-09-14 LAB
FLUAV RNA SPEC QL NAA+PROBE: NEGATIVE
FLUBV RNA RESP QL NAA+PROBE: NEGATIVE
RSV RNA SPEC NAA+PROBE: NEGATIVE
SARS-COV-2 RNA RESP QL NAA+PROBE: POSITIVE

## 2022-09-14 PROCEDURE — 99283 EMERGENCY DEPT VISIT LOW MDM: CPT | Performed by: PHYSICIAN ASSISTANT

## 2022-09-14 PROCEDURE — 99282 EMERGENCY DEPT VISIT SF MDM: CPT | Mod: CS | Performed by: PHYSICIAN ASSISTANT

## 2022-09-14 PROCEDURE — 87637 SARSCOV2&INF A&B&RSV AMP PRB: CPT | Performed by: PHYSICIAN ASSISTANT

## 2022-09-14 PROCEDURE — C9803 HOPD COVID-19 SPEC COLLECT: HCPCS | Performed by: PHYSICIAN ASSISTANT

## 2022-09-14 ASSESSMENT — ENCOUNTER SYMPTOMS
SHORTNESS OF BREATH: 0
FEVER: 1
VOMITING: 0
DYSURIA: 0
MYALGIAS: 1
ABDOMINAL PAIN: 0
FATIGUE: 1
NAUSEA: 0
CONFUSION: 0

## 2022-09-14 ASSESSMENT — ACTIVITIES OF DAILY LIVING (ADL): ADLS_ACUITY_SCORE: 33

## 2022-09-14 NOTE — ED TRIAGE NOTES
Pt is here today with her mom and son (son also being seen),  She has not been feeling well for the past 3 days.  Fever of 104.9 that was reducible with tylenol. She is also c/o sore throat, headache and cough.  Last had Excedrin at 1630 for her HALL  Tamia Short RN on 9/14/2022 at 5:40 PM     Triage Assessment     Row Name 09/14/22 [id number]       Triage Assessment (Adult)    Airway WDL WDL       Respiratory WDL    Respiratory WDL WDL       Skin Circulation/Temperature WDL    Skin Circulation/Temperature WDL WDL       Cardiac WDL    Cardiac WDL WDL       Peripheral/Neurovascular WDL    Peripheral Neurovascular WDL WDL       Cognitive/Neuro/Behavioral WDL    Cognitive/Neuro/Behavioral WDL WDL       Lavalette Coma Scale    Best Eye Response 4-->(E4) spontaneous    Best Motor Response 6-->(M6) obeys commands    Best Verbal Response 5-->(V5) oriented    Wojciech Coma Scale Score 15

## 2022-09-15 NOTE — DISCHARGE INSTRUCTIONS
Get plenty of fluids and rest.  As discussed, you were positive for COVID that would explain a lot of your symptoms.  Is not felt that further imaging or lab work is needed at this time.  I recommend you alternate 500 mg of Tylenol and 600 mg of ibuprofen every 4 hours over the next couple of days  To help with fever and comfort control.  Return if you are having difficulty breathing or becoming dehydrated etc.  She should self quarantine for at least 5 days.

## 2022-09-15 NOTE — ED PROVIDER NOTES
History     Chief Complaint   Patient presents with     Pharyngitis     Cough     Fever     Headache     HPI  Alize Fountain is a 26 year old female who presents ED for evaluation of fever, myalgias. Pt is here today with her mom and son (son also being seen),  She has not been feeling well for the past 3 days.  Fever of 104.9 that was reducible with tylenol. She is also c/o sore throat, headache and cough.  Last had Excedrin at 1630 for her HA.    Allergies:  Allergies   Allergen Reactions     Azithromycin Rash     Gin [Alcohol] Swelling     Eye swelling and itching        Problem List:    Patient Active Problem List    Diagnosis Date Noted     Pericardial cyst 2022     Priority: Medium     Preeclampsia in postpartum period 2022     Priority: Medium     Class 3 severe obesity due to excess calories without serious comorbidity with body mass index (BMI) of 40.0 to 44.9 in adult (H) 2022     Priority: Medium     Normal labor and delivery 2022     Priority: Medium     Encounter for triage in pregnant patient 2022     Priority: Medium     40 weeks gestation of pregnancy 2020     Priority: Medium      (normal spontaneous vaginal delivery) 2020     Priority: Medium     SAB (spontaneous ) 2016     Priority: Medium     HPV vaccine counseling 2016     Priority: Medium     Supervision of normal pregnancy in first trimester 2016     Priority: Medium     Contraceptive management 2015     Priority: Medium     TMJ pain dysfunction syndrome 2014     Priority: Medium     Abdominal pain, generalized 10/14/2009     Priority: Medium     Overview:   IMO Update 10/11       Allergic state 2007     Priority: Medium     Overview:   IMO Update 10/11          Past Medical History:    Past Medical History:   Diagnosis Date     Pericardial cyst      Preeclampsia in postpartum period      Recurrent pregnancy loss        Past Surgical History:   "  Past Surgical History:   Procedure Laterality Date     DILATION AND CURETTAGE SUCTION N/A 2019    Surgeon: Cody Jenkins MD;  Location: [facility] OR       Family History:    Family History   Problem Relation Age of Onset     Thyroid nodules Mother      Dysfunctional uterine bleeding Mother      Colon Polyps Mother      Hyperlipidemia Father      Hypertension Father      Diabetes Paternal Grandfather      Cerebrovascular Disease Paternal Grandfather      Myocardial Infarction Paternal Grandfather        Social History:  Marital Status:   [2]  Social History     Tobacco Use     Smoking status: Former Smoker     Packs/day: 0.50     Types: Cigarettes     Quit date: 2022     Years since quittin.2     Smokeless tobacco: Never Used     Tobacco comment: 15 a day   Vaping Use     Vaping Use: Never used   Substance Use Topics     Alcohol use: Not Currently     Comment: Rarely      Drug use: Never        Medications:    No current outpatient medications on file.        Review of Systems   Constitutional: Positive for fatigue and fever.   HENT: Negative for congestion.    Eyes: Negative for visual disturbance.   Respiratory: Negative for shortness of breath.    Cardiovascular: Negative for chest pain.   Gastrointestinal: Negative for abdominal pain, nausea and vomiting.   Genitourinary: Negative for dysuria.   Musculoskeletal: Positive for myalgias.   Psychiatric/Behavioral: Negative for confusion.       Physical Exam   BP: 127/74  Pulse: 107  Temp: 100.4  F (38  C)  Resp: 20  Height: 170.2 cm (5' 7\")  Weight: 122.9 kg (271 lb)  SpO2: 95 %      Physical Exam  Constitutional:       General: She is not in acute distress.     Appearance: She is well-developed. She is not diaphoretic.   HENT:      Head: Normocephalic and atraumatic.   Eyes:      General: No scleral icterus.     Conjunctiva/sclera: Conjunctivae normal.   Cardiovascular:      Rate and Rhythm: Normal rate and regular rhythm.   Pulmonary:      " Effort: Pulmonary effort is normal.      Breath sounds: Normal breath sounds.   Abdominal:      Palpations: Abdomen is soft.      Tenderness: There is no abdominal tenderness.   Musculoskeletal:         General: No deformity.      Cervical back: Neck supple.   Lymphadenopathy:      Cervical: No cervical adenopathy.   Skin:     General: Skin is warm and dry.      Coloration: Skin is not jaundiced.      Findings: No rash.   Neurological:      Mental Status: She is alert and oriented to person, place, and time. Mental status is at baseline.   Psychiatric:         Mood and Affect: Mood normal.         Behavior: Behavior normal.         ED Course                 Procedures              Critical Care time:  none               Results for orders placed or performed during the hospital encounter of 09/14/22 (from the past 24 hour(s))   Symptomatic; Unknown Influenza A/B & SARS-CoV2 (COVID-19) Virus PCR Multiplex Nose    Specimen: Nose; Swab   Result Value Ref Range    Influenza A PCR Negative Negative    Influenza B PCR Negative Negative    RSV PCR Negative Negative    SARS CoV2 PCR Positive (A) Negative    Narrative    Testing was performed using the Xpert Xpress CoV2/Flu/RSV Assay on the [employer] GeneXpert Instrument. This test should be ordered for the detection of SARS-CoV-2 and influenza viruses in individuals who meet clinical and/or epidemiological criteria. Test performance is unknown in asymptomatic patients. This test is for in vitro diagnostic use under the FDA EUA for laboratories certified under CLIA to perform high or moderate complexity testing. This test has not been FDA cleared or approved. A negative result does not rule out the presence of PCR inhibitors in the specimen or target RNA in concentration below the limit of detection for the assay. If only one viral target is positive but coinfection with multiple targets is suspected, the sample should be re-tested with another FDA cleared, approved, or  authorized test, if coinfection would change clinical management. This test was validated by the [facility]. These laboratories are certified under the Clinical  Laboratory Improvement Amendments of 1988 (CLIA-88) as qualified to perform high complexity laboratory testing.       Medications - No data to display    Assessments & Plan (with Medical Decision Making)   Pt nontoxic in NAD. Heart, lung, bowel sounds normal, abd soft, nontender to palpation, nondistended. VSS, she is febrile.     She is positive for COVID which would certainly explain the symptoms that she is feeling.  She otherwise appears very well.  She is not hypoxic.  She is still eating and drinking appropriately.  Fevers are controlled with antipyretics.     I am encouraged by her well appearance as well as her stable vital signs and reassuring physical exam.  Therefore, no further studies are indicated at this time.  She will continue with conservative treatment at home and self quarantine.    Strict return precautions are given to the pt, they will return if symptoms are worsening or concerning. The pt understands and agrees with the plan and they are discharged.     Kleber Gallagher PA-C    I have reviewed the nursing notes.    I have reviewed the findings, diagnosis, plan and need for follow up with the patient.       There are no discharge medications for this patient.      Final diagnoses:   Infection due to 2019 novel coronavirus       9/14/2022   [facility] AND Kleber Bill PA  09/14/22 [id number]

## 2022-11-30 ENCOUNTER — OFFICE VISIT (OUTPATIENT)
Dept: FAMILY MEDICINE | Facility: OTHER | Age: 27
End: 2022-11-30
Attending: FAMILY MEDICINE
Payer: COMMERCIAL

## 2022-11-30 VITALS
SYSTOLIC BLOOD PRESSURE: 132 MMHG | RESPIRATION RATE: 16 BRPM | TEMPERATURE: 98.7 F | DIASTOLIC BLOOD PRESSURE: 70 MMHG | HEART RATE: 74 BPM | WEIGHT: 271.2 LBS | OXYGEN SATURATION: 99 % | BODY MASS INDEX: 42.48 KG/M2

## 2022-11-30 DIAGNOSIS — L72.9 GENERALIZED SKIN CYSTS: Primary | ICD-10-CM

## 2022-11-30 PROCEDURE — 11104 PUNCH BX SKIN SINGLE LESION: CPT | Performed by: FAMILY MEDICINE

## 2022-11-30 PROCEDURE — 99212 OFFICE O/P EST SF 10 MIN: CPT | Mod: 25 | Performed by: FAMILY MEDICINE

## 2022-11-30 PROCEDURE — 11105 PUNCH BX SKIN EA SEP/ADDL: CPT | Performed by: FAMILY MEDICINE

## 2022-11-30 PROCEDURE — 88305 TISSUE EXAM BY PATHOLOGIST: CPT

## 2022-11-30 PROCEDURE — 250N000009 HC RX 250: Performed by: FAMILY MEDICINE

## 2022-11-30 RX ORDER — LIDOCAINE HCL/EPINEPHRINE/PF 2%-1:200K
5 VIAL (ML) INJECTION ONCE
Status: COMPLETED | OUTPATIENT
Start: 2022-11-30 | End: 2022-11-30

## 2022-11-30 RX ADMIN — LIDOCAINE HYDROCHLORIDE,EPINEPHRINE BITARTRATE 5 ML: 20; .005 INJECTION, SOLUTION EPIDURAL; INFILTRATION; INTRACAUDAL; PERINEURAL at 10:20

## 2022-11-30 ASSESSMENT — PATIENT HEALTH QUESTIONNAIRE - PHQ9
10. IF YOU CHECKED OFF ANY PROBLEMS, HOW DIFFICULT HAVE THESE PROBLEMS MADE IT FOR YOU TO DO YOUR WORK, TAKE CARE OF THINGS AT HOME, OR GET ALONG WITH OTHER PEOPLE: NOT DIFFICULT AT ALL
SUM OF ALL RESPONSES TO PHQ QUESTIONS 1-9: 0
SUM OF ALL RESPONSES TO PHQ QUESTIONS 1-9: 0

## 2022-11-30 ASSESSMENT — PAIN SCALES - GENERAL: PAINLEVEL: NO PAIN (0)

## 2022-11-30 NOTE — PROGRESS NOTES
"  Assessment & Plan       ICD-10-CM    1. Generalized skin cysts  L72.9 Surgical Pathology Exam     MO PUNCH BIOPSY OF SKIN, FIRST/SINGLE LESION     MO PUNCH BIOPSY OF SKIN, EA SEPARATE/ADDL LESION     lidocaine 2%-EPINEPHrine 1:200,000 injection 5 mL        1.  Follow-up skin care instructions are discussed.  She will be back in next week for her physical.  We will see if sutures are ready for removal at that time and we will also see if pathology results are back.             BMI:   Estimated body mass index is 42.48 kg/m  as calculated from the following:    Height as of 9/14/22: 1.702 m (5' 7\").    Weight as of this encounter: 123 kg (271 lb 3.2 oz).           No follow-ups on file.    KASEY FLYNN MD  [facility] AND [facility]    Markie Herrmann is a 27 year old, presenting for the following health issues:  Cyst      History of Present Illness       Reason for visit:  Removal of cysts    She eats 2-3 servings of fruits and vegetables daily.She consumes 1 sweetened beverage(s) daily.She exercises with enough effort to increase her heart rate 10 to 19 minutes per day.  She exercises with enough effort to increase her heart rate 4 days per week.   She is taking medications regularly.    Today's PHQ-9         PHQ-9 Total Score: 0    PHQ-9 Q9 Thoughts of better off dead/self-harm past 2 weeks :   Not at all    How difficult have these problems made it for you to do your work, take care of things at home, or get along with other people: Not difficult at all         No current outpatient medications on file.     No current facility-administered medications for this visit.         Review of Systems         Objective    /70   Pulse 74   Temp 98.7  F (37.1  C) (Tympanic)   Resp 16   Wt 123 kg (271 lb 3.2 oz)   SpO2 99%   Breastfeeding No   BMI 42.48 kg/m    Body mass index is 42.48 kg/m .  Physical Exam  Vitals and nursing note reviewed.   Constitutional:       Appearance: Normal " appearance.   Skin:     Comments: Cyst left chest wall - 4mm  Right upper back - 6mm  Right posterior thigh - 6mm  Each is cleansed, anesthetized with 1% lidocaine.  Punch biopsy/removal is performed.  Single nylon suture is placed to close each 1.  The cyst/lesion from the left upper chest wall is sent for pathology.   Neurological:      Mental Status: She is alert.

## 2022-11-30 NOTE — NURSING NOTE
"Chief Complaint   Patient presents with     Cyst     Patient is here for 3 cysts- 1 on back, 1 on chest area, and 1 on buttocks     Initial /70   Pulse 74   Temp 98.7  F (37.1  C) (Tympanic)   Resp 16   Wt 123 kg (271 lb 3.2 oz)   SpO2 99%   Breastfeeding No   BMI 42.48 kg/m   Estimated body mass index is 42.48 kg/m  as calculated from the following:    Height as of 9/14/22: 1.702 m (5' 7\").    Weight as of this encounter: 123 kg (271 lb 3.2 oz).  Medication Reconciliation: complete    Jerica Stauffer CMA       FOOD SECURITY SCREENING QUESTIONS:    The next two questions are to help us understand your food security.  If you are feeling you need any assistance in this area, we have resources available to support you today.    Hunger Vital Signs:  Within the past 12 months we worried whether our food would run out before we got money to buy more. Never  Within the past 12 months the food we bought just didn't last and we didn't have money to get more. Never  Jerica Stauffer CMA,LPN on 11/30/2022 at 10:14 AM      "

## 2022-12-06 LAB
PATH REPORT.COMMENTS IMP SPEC: NORMAL
PATH REPORT.FINAL DX SPEC: NORMAL
PHOTO IMAGE: NORMAL

## 2022-12-13 ENCOUNTER — MEDICAL CORRESPONDENCE (OUTPATIENT)
Dept: HEALTH INFORMATION MANAGEMENT | Facility: OTHER | Age: 27
End: 2022-12-13

## 2023-01-09 ENCOUNTER — HOSPITAL ENCOUNTER (OUTPATIENT)
Dept: GENERAL RADIOLOGY | Facility: OTHER | Age: 28
Discharge: HOME | End: 2023-01-09
Attending: FAMILY MEDICINE
Payer: COMMERCIAL

## 2023-01-09 ENCOUNTER — OFFICE VISIT (OUTPATIENT)
Dept: FAMILY MEDICINE | Facility: OTHER | Age: 28
End: 2023-01-09
Attending: FAMILY MEDICINE
Payer: COMMERCIAL

## 2023-01-09 VITALS
TEMPERATURE: 98.2 F | HEIGHT: 67 IN | DIASTOLIC BLOOD PRESSURE: 82 MMHG | RESPIRATION RATE: 16 BRPM | BODY MASS INDEX: 43.19 KG/M2 | SYSTOLIC BLOOD PRESSURE: 122 MMHG | OXYGEN SATURATION: 99 % | HEART RATE: 81 BPM | WEIGHT: 275.2 LBS

## 2023-01-09 DIAGNOSIS — Q24.8 PERICARDIAL CYST ALONG RIGHT CARDIOPHRENIC ANGLE: ICD-10-CM

## 2023-01-09 DIAGNOSIS — E66.813 CLASS 3 SEVERE OBESITY DUE TO EXCESS CALORIES WITHOUT SERIOUS COMORBIDITY WITH BODY MASS INDEX (BMI) OF 40.0 TO 44.9 IN ADULT (H): ICD-10-CM

## 2023-01-09 DIAGNOSIS — E66.01 CLASS 3 SEVERE OBESITY DUE TO EXCESS CALORIES WITHOUT SERIOUS COMORBIDITY WITH BODY MASS INDEX (BMI) OF 40.0 TO 44.9 IN ADULT (H): ICD-10-CM

## 2023-01-09 DIAGNOSIS — R22.40 NODULE OF SKIN OF FOOT: ICD-10-CM

## 2023-01-09 DIAGNOSIS — L72.3 SEBACEOUS CYST: ICD-10-CM

## 2023-01-09 DIAGNOSIS — Z00.00 PHYSICAL EXAM, ANNUAL: Primary | ICD-10-CM

## 2023-01-09 DIAGNOSIS — B36.0 TINEA VERSICOLOR: ICD-10-CM

## 2023-01-09 LAB
ALBUMIN SERPL BCG-MCNC: 4.5 G/DL (ref 3.5–5.2)
ALP SERPL-CCNC: 92 U/L (ref 35–104)
ALT SERPL W P-5'-P-CCNC: 14 U/L (ref 10–35)
ANION GAP SERPL CALCULATED.3IONS-SCNC: 9 MMOL/L (ref 7–15)
AST SERPL W P-5'-P-CCNC: 14 U/L (ref 10–35)
BILIRUB SERPL-MCNC: 0.2 MG/DL
BUN SERPL-MCNC: 14.2 MG/DL (ref 6–20)
CALCIUM SERPL-MCNC: 9.5 MG/DL (ref 8.6–10)
CHLORIDE SERPL-SCNC: 105 MMOL/L (ref 98–107)
CHOLEST SERPL-MCNC: 135 MG/DL
CREAT SERPL-MCNC: 0.65 MG/DL (ref 0.51–0.95)
DEPRECATED HCO3 PLAS-SCNC: 26 MMOL/L (ref 22–29)
GFR SERPL CREATININE-BSD FRML MDRD: >90 ML/MIN/1.73M2
GLUCOSE SERPL-MCNC: 88 MG/DL (ref 70–99)
HBA1C MFR BLD: 4.8 % (ref 4–6.2)
HDLC SERPL-MCNC: 45 MG/DL
LDLC SERPL CALC-MCNC: 75 MG/DL
NONHDLC SERPL-MCNC: 90 MG/DL
POTASSIUM SERPL-SCNC: 4.2 MMOL/L (ref 3.4–5.3)
PROT SERPL-MCNC: 7.8 G/DL (ref 6.4–8.3)
SODIUM SERPL-SCNC: 140 MMOL/L (ref 136–145)
TRIGL SERPL-MCNC: 75 MG/DL
TSH SERPL DL<=0.005 MIU/L-ACNC: 0.81 UIU/ML (ref 0.3–4.2)

## 2023-01-09 PROCEDURE — 36415 COLL VENOUS BLD VENIPUNCTURE: CPT | Mod: ZL | Performed by: FAMILY MEDICINE

## 2023-01-09 PROCEDURE — 84443 ASSAY THYROID STIM HORMONE: CPT | Mod: ZL | Performed by: FAMILY MEDICINE

## 2023-01-09 PROCEDURE — 80061 LIPID PANEL: CPT | Mod: ZL | Performed by: FAMILY MEDICINE

## 2023-01-09 PROCEDURE — 83036 HEMOGLOBIN GLYCOSYLATED A1C: CPT | Mod: ZL | Performed by: FAMILY MEDICINE

## 2023-01-09 PROCEDURE — 80053 COMPREHEN METABOLIC PANEL: CPT | Mod: ZL | Performed by: FAMILY MEDICINE

## 2023-01-09 PROCEDURE — G0123 SCREEN CERV/VAG THIN LAYER: HCPCS | Performed by: FAMILY MEDICINE

## 2023-01-09 PROCEDURE — 99395 PREV VISIT EST AGE 18-39: CPT | Performed by: FAMILY MEDICINE

## 2023-01-09 PROCEDURE — 73630 X-RAY EXAM OF FOOT: CPT | Mod: RT

## 2023-01-09 ASSESSMENT — ENCOUNTER SYMPTOMS
PARESTHESIAS: 0
FREQUENCY: 0
MYALGIAS: 0
ARTHRALGIAS: 0
JOINT SWELLING: 0
DYSURIA: 0
COUGH: 0
WEAKNESS: 0
CHILLS: 0
HEMATURIA: 0
CONSTIPATION: 0
BREAST MASS: 0
NAUSEA: 0
PALPITATIONS: 0
SHORTNESS OF BREATH: 0
EYE PAIN: 0
SORE THROAT: 0
DIZZINESS: 0
HEARTBURN: 0
HEADACHES: 0
NERVOUS/ANXIOUS: 0
FEVER: 0
ABDOMINAL PAIN: 0
HEMATOCHEZIA: 0
DIARRHEA: 0

## 2023-01-09 ASSESSMENT — PAIN SCALES - GENERAL: PAINLEVEL: NO PAIN (0)

## 2023-01-09 NOTE — NURSING NOTE
"Chief Complaint   Patient presents with     Physical     Patient is here for annual physical     Initial /82   Pulse 81   Temp 98.2  F (36.8  C) (Tympanic)   Resp 16   Ht 1.702 m (5' 7\")   Wt 124.8 kg (275 lb 3.2 oz)   LMP 12/24/2022   SpO2 99%   Breastfeeding No   BMI 43.10 kg/m   Estimated body mass index is 43.1 kg/m  as calculated from the following:    Height as of this encounter: 1.702 m (5' 7\").    Weight as of this encounter: 124.8 kg (275 lb 3.2 oz).  Medication Reconciliation: complete    Jerica Stauffer CMA       FOOD SECURITY SCREENING QUESTIONS:    The next two questions are to help us understand your food security.  If you are feeling you need any assistance in this area, we have resources available to support you today.    Hunger Vital Signs:  Within the past 12 months we worried whether our food would run out before we got money to buy more. Never  Within the past 12 months the food we bought just didn't last and we didn't have money to get more. Never  Jerica Stauffer CMA,LPN on 1/9/2023 at 11:31 AM      "

## 2023-01-09 NOTE — PATIENT INSTRUCTIONS
Things you can try:  dandruff shampoos/antifungal shampoos     Overall for family:  mediterranean or flexitarian   For rapid weight loss:  Keto  For general wt management:  Weight Watchers (look up their zero point food list)

## 2023-01-09 NOTE — PROGRESS NOTES
SUBJECTIVE:   CC: Alize is an 27 year old who presents for preventive health visit.     Healthy Habits:     Getting at least 3 servings of Calcium per day:  Yes    Bi-annual eye exam:  NO    Dental care twice a year:  NO    Sleep apnea or symptoms of sleep apnea:  None    Diet:  Regular (no restrictions)    Frequency of exercise:  1 day/week    Duration of exercise:  30-45 minutes    Taking medications regularly:  Yes    Medication side effects:  Not applicable    PHQ-2 Total Score: 0    Additional concerns today:  No      1.  Rash on her back - mildly itchy, been there over a year.   Tried hydrocortisone cream.    2.  History of pre-E ; current method of birth control is condoms.  Uncertain regarding future childbearing.  3.  Right foot with nodule laterally; in same area of dry skin; history of 5th MT fracture on that side.          Today's PHQ-2 Score:   PHQ-2 (  [employer]) 2023   Q1: Little interest or pleasure in doing things 0   Q2: Feeling down, depressed or hopeless 0   PHQ-2 Score 0   PHQ-2 Total Score (12-17 Years)- Positive if 3 or more points; Administer PHQ-A if positive -   Q1: Little interest or pleasure in doing things Not at all   Q2: Feeling down, depressed or hopeless Not at all   PHQ-2 Score 0       Have you ever done Advance Care Planning? (For example, a Health Directive, POLST, or a discussion with a medical provider or your loved ones about your wishes): No, advance care planning information given to patient to review.  Patient declined advance care planning discussion at this time.    Social History     Tobacco Use     Smoking status: Former     Packs/day: 0.50     Types: Cigarettes     Quit date: 2022     Years since quittin.5     Smokeless tobacco: Never   Substance Use Topics     Alcohol use: Not Currently     Comment: Rarely          Alcohol Use 2023   Prescreen: >3 drinks/day or >7 drinks/week? No       Reviewed orders with patient.  Reviewed health maintenance and  updated orders accordingly - Yes  Lab work is in process    Breast Cancer Screening:  Any new diagnosis of family breast, ovarian, or bowel cancer? No    FHS-7: No flowsheet data found.    Patient under 40 years of age: Routine Mammogram Screening not recommended.   Pertinent mammograms are reviewed under the imaging tab.    History of abnormal Pap smear: NO - age 21-29 PAP every 3 years recommended  PAP / HPV 2020   PAP (Historical) NIL     Reviewed and updated as needed this visit by clinical staff   Tobacco  Allergies  Meds     [facility] Hx          Reviewed and updated as needed this visit by Provider         Vu Hx         Past Medical History:   Diagnosis Date     Pericardial cyst      Preeclampsia in postpartum period      Recurrent pregnancy loss       Past Surgical History:   Procedure Laterality Date     DILATION AND CURETTAGE SUCTION N/A 2019    Surgeon: Cody Jenkins MD;  Location:  OR     OB History    Para Term  AB Living   5 2 2 0 3 2   SAB IAB Ectopic Multiple Live Births   3 0 0 0 2      # Outcome Date GA Lbr Kip/2nd Weight Sex Delivery Anes PTL Lv   5 Term 22 39w3d 07:49 / 00:06 3.779 kg (8 lb 5.3 oz) M Vag-Spont EPI N ROSY      Name: JUAN M BENNETT-LOGAN      Apgar1: 8  Apgar5: 9   4 Term 20 40w0d 01:15 / 00:47 3.657 kg (8 lb 1 oz) M Vag-Spont EPI N ROSY      Name: JUAN M BENNETT-LOGAN      Apgar1: 9  Apgar5: 9   3 SAB 18     SAB   FD   2 SAB      SAB   FD   1 SAB      SAB   FD      Obstetric Comments   NIPT yes       Review of Systems   Constitutional: Negative for chills and fever.   HENT: Negative for congestion, ear pain, hearing loss and sore throat.    Eyes: Negative for pain and visual disturbance.   Respiratory: Negative for cough and shortness of breath.    Cardiovascular: Negative for chest pain, palpitations and peripheral edema.   Gastrointestinal: Negative for abdominal pain, constipation, diarrhea, heartburn, hematochezia and nausea.  "  Breasts:  Negative for tenderness, breast mass and discharge.   Genitourinary: Negative for dysuria, frequency, genital sores, hematuria, pelvic pain, urgency, vaginal bleeding and vaginal discharge.   Musculoskeletal: Negative for arthralgias, joint swelling and myalgias.   Skin: Positive for rash.   Neurological: Negative for dizziness, weakness, headaches and paresthesias.   Psychiatric/Behavioral: Negative for mood changes. The patient is not nervous/anxious.      Birth control - condoms  last dentist - 2015-16       OBJECTIVE:   /82   Pulse 81   Temp 98.2  F (36.8  C) (Tympanic)   Resp 16   Ht 1.702 m (5' 7\")   Wt 124.8 kg (275 lb 3.2 oz)   LMP 12/24/2022   SpO2 99%   Breastfeeding No   BMI 43.10 kg/m     Wt Readings from Last 4 Encounters:   01/09/23 124.8 kg (275 lb 3.2 oz)   11/30/22 123 kg (271 lb 3.2 oz)   09/14/22 122.9 kg (271 lb)   09/08/22 122.5 kg (270 lb)       Physical Exam  GENERAL: healthy, alert and no distress  EYES: Eyes grossly normal to inspection, PERRL and conjunctivae and sclerae normal  HENT: ear canals and TM's normal, nose and mouth without ulcers or lesions  NECK: no adenopathy, no asymmetry, masses, or scars and thyroid normal to palpation  RESP: lungs clear to auscultation - no rales, rhonchi or wheezes  BREAST: normal without masses, tenderness or nipple discharge and no palpable axillary masses or adenopathy  CV: regular rate and rhythm, normal S1 S2, no S3 or S4, no murmur, click or rub, no peripheral edema and peripheral pulses strong  ABDOMEN: soft, nontender, no hepatosplenomegaly, no masses and bowel sounds normal  MS: Nodule present right lateral foot, this is proximal and superior to the right fifth metatarsal but distal to the lateral malleolus  SKIN: Pink, minimally scaly patches across her upper back; dry skin across her feet  NEURO: Normal strength and tone, mentation intact and speech normal  PSYCH: mentation appears normal, affect " "normal/bright        ASSESSMENT/PLAN:       ICD-10-CM    1. Physical exam, annual  Z00.00 Pap Screen reflex to HPV if ASCUS - recommended age 25 - 29 years     Hemoglobin A1c     Lipid Profile     Comprehensive Metabolic Panel     TSH     TSH     Comprehensive Metabolic Panel     Lipid Profile     Hemoglobin A1c      2. Sebaceous cyst  L72.3       3. Class 3 severe obesity due to excess calories without serious comorbidity with body mass index (BMI) of 40.0 to 44.9 in adult (H)  E66.01     Z68.41       4. Tinea versicolor  B36.0       5. Nodule of skin of foot  R22.40 XR Foot Right G/E 3 Views      6. Pericardial cyst along right cardiophrenic angle  Q24.8 Echocardiogram Complete        1.  Pap smear is obtained today.  2.  X-ray of right foot to be obtained today.  The nodule present is not mobile, uncertain if calcified.  3.  Skin findings are reviewed, patient education information regarding tinea versicolor and over-the-counter treatments discussed.  4.  Patient was seen this fall for a pericardial cyst.  Recommendation was for recheck in 1 year.  Echocardiogram is ordered.  5.  Labs today include A1c, lipids, metabolic panel, TSH.  6.  Mom with history of thyroid nodule, biopsy done on this a year ago was negative.  She is having a follow-up biopsy done.  Patient will contact me with pathology results.  7.  With family history and her history of preeclampsia, patient is at increased long-term risk of chronic hypertension/vascular disease.          COUNSELING:  Reviewed preventive health counseling, as reflected in patient instructions       Regular exercise       Healthy diet/nutrition       Dental care       BMI:   Estimated body mass index is 43.1 kg/m  as calculated from the following:    Height as of this encounter: 1.702 m (5' 7\").    Weight as of this encounter: 124.8 kg (275 lb 3.2 oz).   Weight management plan: discussed specific long term dietary plans      She reports that she quit smoking about 6 " months ago. Her smoking use included cigarettes. She smoked an average of .5 packs per day. She has never used smokeless tobacco.      KASEY FLYNN MD  [facility]  Answers for HPI/ROS submitted by the patient on 1/9/2023  If you checked off any problems, how difficult have these problems made it for you to do your work, take care of things at home, or get along with other people?: Not difficult at all  PHQ9 TOTAL SCORE: 0

## 2023-01-18 LAB
BKR LAB AP GYN ADEQUACY: NORMAL
BKR LAB AP GYN INTERPRETATION: NORMAL
BKR LAB AP HPV REFLEX: NORMAL
BKR LAB AP PREVIOUS ABNORMAL: NORMAL
PATH REPORT.COMMENTS IMP SPEC: NORMAL
PATH REPORT.COMMENTS IMP SPEC: NORMAL
PATH REPORT.RELEVANT HX SPEC: NORMAL

## 2023-02-15 ENCOUNTER — MEDICAL CORRESPONDENCE (OUTPATIENT)
Dept: HEALTH INFORMATION MANAGEMENT | Facility: OTHER | Age: 28
End: 2023-02-15
Payer: COMMERCIAL

## 2023-09-18 NOTE — PROGRESS NOTES
"    Assessment & Plan       ICD-10-CM    1. Rib pain on left side  R07.81 Physical Therapy Referral     XR Ribs & Chest Left G/E 3 Views     CANCELED: XR Ribs Left 2 Views           I have personally reviewed the imaging test listed below.    Location and findings not consistent with breast pain/pathology.  This was patient's primary concern  Referral to physical therapy  - I suspect that with carrying infant she has developed some increased musculoskeletal mechanical symptoms     PDMP Review       None                     BMI:   Estimated body mass index is 41.82 kg/m  as calculated from the following:    Height as of 1/9/23: 1.702 m (5' 7\").    Weight as of this encounter: 121.1 kg (267 lb).           No follow-ups on file.    KASEY FLYNN MD  [facility]    [facility]   Alize Fountain is a 27 year old female  presenting for the following health issues: Nursing Notes:   Karime Dockery LPN  9/19/2023 10:14 AM  Signed  Chief Complaint   Patient presents with    Breast Pain       Initial /72   Pulse 88   Temp 97.7  F (36.5  C)   Resp 17   Wt 121.1 kg (267 lb)   LMP 08/24/2023 (Approximate)   SpO2 99%   BMI 41.82 kg/m   Estimated body mass index is 41.82 kg/m  as calculated from the following:    Height as of 1/9/23: 1.702 m (5' 7\").    Weight as of this encounter: 121.1 kg (267 lb).  Medication Reconciliation: complete    FOOD SECURITY SCREENING QUESTIONS  Hunger Vital Signs:  Within the past 12 months we worried whether our food would run out before we got money to buy more. Never  Within the past 12 months the food we bought just didn't last and we didn't have money to get more. Never  Karime Dockery LPN 9/19/2023 10:13 AM           Karime Dockery LPN                               [facility] [place] Alize Fountain is a 27 year old female presents for left breast and chest pain.  Onset about 3 months ago.  First was intermittent, now more constant.  Going to the " chiropractor and has a rib put back in helps some.  She has been going once a week x3 weeks.  Some shoulder activity makes it worse.  No rash.  No trauma.  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    No breast lumps or masses.  Most of the pain is lateral chest, but if kids are on her it is more midline.      She is right handed.    Mom with osteoarthritis - but not involving ribs.          No current outpatient medications on file.     No current facility-administered medications for this visit.     Past Medical History:   Diagnosis Date    Pericardial cyst     Preeclampsia in postpartum period 2022    Recurrent pregnancy loss                Review of Systems           11/10/2021     1:33 PM 11/30/2022     9:54 AM 1/9/2023    11:21 AM   PHQ   PHQ-9 Total Score 0 0 0   Q9: Thoughts of better off dead/self-harm past 2 weeks Not at all Not at all Not at all         8/22/2022     7:04 AM   JYOTI-7 SCORE   Total Score 0 (minimal anxiety)   Total Score 0             Objective  /72   Pulse 88   Temp 97.7  F (36.5  C)   Resp 17   Wt 121.1 kg (267 lb)   LMP 08/24/2023 (Approximate)   SpO2 99%   BMI 41.82 kg/m     Physical Exam   GENERAL: healthy, alert, and no distress  RESP: lungs clear to auscultation - no rales, rhonchi or wheezes  CV: regular rates and rhythm and no murmur, click or rub  MUSCULOSKELETAL:  left lateral scapula and chest wall tenderness, worse with shoulder rotation; does not extend up into breast tissue.      Results for orders placed or performed during the hospital encounter of 09/19/23   XR Ribs & Chest Left G/E 3 Views     Status: None    Narrative    XR RIBS AND CHEST LEFT 3 VIEWS, 9/19/2023 11:12 AM    History: Female, age 27 years; Rib pain on left side    Comparison: Chest x-ray 7/17/2022    Technique: Single view of the chest and Four views of the left ribs.    FINDINGS: The cardiac silhouette is within normal limits. The  pulmonary vasculature is within normal limits. Evaluation of the  ribs  demonstrates no fracture.      Impression    IMPRESSION:   No acute fracture nor evidence of pneumothorax.    TENZIN QUEZADA MD         SYSTEM ID:  [id number]           Answers submitted by the patient for this visit:  General Questionnaire (Submitted on 9/19/2023)  Chief Complaint: Chronic problems general questions HPI Form  How many servings of fruits and vegetables do you eat daily?: 2-3  On average, how many sweetened beverages do you drink each day (Examples: soda, juice, sweet tea, etc.  Do NOT count diet or artificially sweetened beverages)?: 1  How many minutes a day do you exercise enough to make your heart beat faster?: 20 to 29  How many days a week do you exercise enough to make your heart beat faster?: 3 or less  How many days per week do you miss taking your medication?: 0  General Concern (Submitted on 9/19/2023)  Chief Complaint: Chronic problems general questions HPI Form  What is the reason for your visit today?: pain in left breast  When did your symptoms begin?: More than a month  What are your symptoms?: pain in left breast  How would you describe these symptoms?: Moderate  Are your symptoms:: Staying the same  Have you had these symptoms before?: No  Is there anything that makes you feel worse?: nothing  Is there anything that makes you feel better?: chiropractor helped a little

## 2023-09-19 ENCOUNTER — HOSPITAL ENCOUNTER (OUTPATIENT)
Dept: GENERAL RADIOLOGY | Facility: OTHER | Age: 28
Discharge: HOME | End: 2023-09-19
Attending: FAMILY MEDICINE
Payer: COMMERCIAL

## 2023-09-19 ENCOUNTER — OFFICE VISIT (OUTPATIENT)
Dept: FAMILY MEDICINE | Facility: OTHER | Age: 28
End: 2023-09-19
Attending: FAMILY MEDICINE
Payer: COMMERCIAL

## 2023-09-19 VITALS
BODY MASS INDEX: 41.82 KG/M2 | OXYGEN SATURATION: 99 % | RESPIRATION RATE: 17 BRPM | HEART RATE: 88 BPM | DIASTOLIC BLOOD PRESSURE: 72 MMHG | TEMPERATURE: 97.7 F | WEIGHT: 267 LBS | SYSTOLIC BLOOD PRESSURE: 118 MMHG

## 2023-09-19 DIAGNOSIS — R07.81 RIB PAIN ON LEFT SIDE: Primary | ICD-10-CM

## 2023-09-19 DIAGNOSIS — R07.81 RIB PAIN ON LEFT SIDE: ICD-10-CM

## 2023-09-19 PROCEDURE — 71101 X-RAY EXAM UNILAT RIBS/CHEST: CPT | Mod: LT

## 2023-09-19 PROCEDURE — 99213 OFFICE O/P EST LOW 20 MIN: CPT | Performed by: FAMILY MEDICINE

## 2023-09-19 ASSESSMENT — PAIN SCALES - GENERAL: PAINLEVEL: MILD PAIN (3)

## 2023-09-19 NOTE — NURSING NOTE
"Chief Complaint   Patient presents with    Breast Pain       Initial /72   Pulse 88   Temp 97.7  F (36.5  C)   Resp 17   Wt 121.1 kg (267 lb)   LMP 08/24/2023 (Approximate)   SpO2 99%   BMI 41.82 kg/m   Estimated body mass index is 41.82 kg/m  as calculated from the following:    Height as of 1/9/23: 1.702 m (5' 7\").    Weight as of this encounter: 121.1 kg (267 lb).  Medication Reconciliation: complete    FOOD SECURITY SCREENING QUESTIONS  Hunger Vital Signs:  Within the past 12 months we worried whether our food would run out before we got money to buy more. Never  Within the past 12 months the food we bought just didn't last and we didn't have money to get more. Never  Kairme Dockery LPN 9/19/2023 10:13 AM           Karime Dockery LPN    "

## 2023-09-26 ENCOUNTER — HOSPITAL ENCOUNTER (OUTPATIENT)
Dept: CARDIOLOGY | Facility: OTHER | Age: 28
Discharge: HOME | End: 2023-09-26
Attending: FAMILY MEDICINE | Admitting: FAMILY MEDICINE
Payer: COMMERCIAL

## 2023-09-26 DIAGNOSIS — Q24.8 PERICARDIAL CYST ALONG RIGHT CARDIOPHRENIC ANGLE: ICD-10-CM

## 2023-09-26 LAB — LVEF ECHO: NORMAL

## 2023-09-26 PROCEDURE — 93306 TTE W/DOPPLER COMPLETE: CPT | Mod: 26 | Performed by: STUDENT IN AN ORGANIZED HEALTH CARE EDUCATION/TRAINING PROGRAM

## 2023-09-26 PROCEDURE — 93306 TTE W/DOPPLER COMPLETE: CPT

## 2025-05-17 ENCOUNTER — HEALTH MAINTENANCE LETTER (OUTPATIENT)
Age: 30
End: 2025-05-17

## 2025-06-10 ENCOUNTER — OFFICE VISIT (OUTPATIENT)
Dept: FAMILY MEDICINE | Facility: OTHER | Age: 30
End: 2025-06-10
Attending: NURSE PRACTITIONER
Payer: COMMERCIAL

## 2025-06-10 VITALS
WEIGHT: 226 LBS | TEMPERATURE: 97.2 F | RESPIRATION RATE: 18 BRPM | SYSTOLIC BLOOD PRESSURE: 102 MMHG | BODY MASS INDEX: 35.47 KG/M2 | HEART RATE: 68 BPM | DIASTOLIC BLOOD PRESSURE: 69 MMHG | HEIGHT: 67 IN | OXYGEN SATURATION: 100 %

## 2025-06-10 DIAGNOSIS — L30.9 DERMATITIS OF FACE: Primary | ICD-10-CM

## 2025-06-10 RX ORDER — TACROLIMUS 1 MG/G
OINTMENT TOPICAL 2 TIMES DAILY
Qty: 60 G | Refills: 0 | Status: SHIPPED | OUTPATIENT
Start: 2025-06-10

## 2025-06-10 ASSESSMENT — PAIN SCALES - GENERAL: PAINLEVEL_OUTOF10: NO PAIN (0)

## 2025-06-10 NOTE — NURSING NOTE
"Chief Complaint   Patient presents with    Rash   Patient presents to the rapid clinic today for a rash under her eyes X6 weeks.     Initial /69 (BP Location: Left arm, Patient Position: Sitting, Cuff Size: Adult Large)   Pulse 68   Temp 97.2  F (36.2  C) (Temporal)   Resp 18   Ht 1.702 m (5' 7\")   Wt 102.5 kg (226 lb)   SpO2 100%   BMI 35.40 kg/m   Estimated body mass index is 35.4 kg/m  as calculated from the following:    Height as of this encounter: 1.702 m (5' 7\").    Weight as of this encounter: 102.5 kg (226 lb).  Medication Review: complete    The next two questions are to help us understand your food security.  If you are feeling you need any assistance in this area, we have resources available to support you today.          6/10/2025   SDOH- Food Insecurity   Within the past 12 months, did you worry that your food would run out before you got money to buy more? N   Within the past 12 months, did the food you bought just not last and you didn t have money to get more? N         Health Care Directive:  Patient does not have a Health Care Directive: Discussed advance care planning with patient; however, patient declined at this time.    Deni Bonilla      "

## 2025-06-10 NOTE — PROGRESS NOTES
ASSESSMENT/PLAN:    I have reviewed the nursing notes.  I have reviewed the findings, diagnosis, plan and need for follow up with the patient.    1. Dermatitis of face (Primary)  - tacrolimus (PROTOPIC) 0.1 % external ointment; Apply topically 2 times daily.  Dispense: 60 g; Refill: 0    Patient presents with an erythematous rash on her face.  Hydrocortisone did not work for patient.  Will treat for dermatitis of the face with a tacrolimus ointment.  Advised patient that she can also try Aquaphor or Eucerin.  May also try an over-the-counter antihistamine such as Claritin or Zyrtec or Benadryl to help with the pruritus as well as cool compresses.    Discussed warning signs/symptoms indicative of need to f/u    Follow up if symptoms persist or worsen or concerns    I explained my diagnostic considerations and recommendations to the patient, who voiced understanding and agreement with the treatment plan. All questions were answered. We discussed potential side effects of any prescribed or recommended therapies, as well as expectations for response to treatments.    ROB Ruiz CNP  6/10/2025  10:08 AM    HPI:    Alize Fountain is a 29 year old female who [facility] today for concerns of rash    Onset of rash was 6 weeks ago.  Location of the rash: skin under eyes.  Associated symptoms include: itching and redness.  Symptoms appear to be stable.  Therapies tried to improve the rash: hydrocortisone and miconazole.  Previous history of a similar rash? No  Recent exposure history: none known       Past Medical History:   Diagnosis Date    Pericardial cyst     Preeclampsia in postpartum period 2022    Recurrent pregnancy loss      Past Surgical History:   Procedure Laterality Date    DILATION AND CURETTAGE SUCTION N/A 02/01/2019    Surgeon: Cody Jenkins MD;  Location:  OR     Social History     Tobacco Use    Smoking status: Some Days     Current packs/day: 0.00     Types: Cigarettes      "Last attempt to quit: 2022     Years since quittin.9    Smokeless tobacco: Never   Substance Use Topics    Alcohol use: Not Currently     Comment: Rarely      No current outpatient medications on file.     Allergies   Allergen Reactions    Azithromycin Rash    Gin [Alcohol] Swelling     Eye swelling and itching      Past medical history, past surgical history, current medications and allergies reviewed and accurate to the best of my knowledge.      ROS:  Refer to HPI    /69 (BP Location: Left arm, Patient Position: Sitting, Cuff Size: Adult Large)   Pulse 68   Temp 97.2  F (36.2  C) (Temporal)   Resp 18   Ht 1.702 m (5' 7\")   Wt 102.5 kg (226 lb)   SpO2 100%   BMI 35.40 kg/m      EXAM:  General Appearance: Well appearing 29 year old female, appropriate appearance for age. No acute distress   Eyes: conjunctivae normal without erythema or irritation, corneas clear, no drainage or crusting, no eyelid swelling, pupils equal   Dermatological: Distal rash of bilateral upper cheeks of face, mild excoriation noted, no signs of infection  Neuro: Alert and oriented to person, place, and time.    Psychological: normal affect, alert, oriented, and pleasant.     "

## 2025-06-16 ENCOUNTER — OFFICE VISIT (OUTPATIENT)
Dept: FAMILY MEDICINE | Facility: OTHER | Age: 30
End: 2025-06-16
Attending: FAMILY MEDICINE
Payer: COMMERCIAL

## 2025-06-16 VITALS
BODY MASS INDEX: 36.02 KG/M2 | TEMPERATURE: 98 F | SYSTOLIC BLOOD PRESSURE: 95 MMHG | RESPIRATION RATE: 22 BRPM | WEIGHT: 230 LBS | DIASTOLIC BLOOD PRESSURE: 68 MMHG | HEART RATE: 88 BPM | OXYGEN SATURATION: 100 %

## 2025-06-16 DIAGNOSIS — A46 ERYSIPELAS: Primary | ICD-10-CM

## 2025-06-16 PROCEDURE — 3078F DIAST BP <80 MM HG: CPT | Performed by: FAMILY MEDICINE

## 2025-06-16 PROCEDURE — 3074F SYST BP LT 130 MM HG: CPT | Performed by: FAMILY MEDICINE

## 2025-06-16 PROCEDURE — 99213 OFFICE O/P EST LOW 20 MIN: CPT | Performed by: FAMILY MEDICINE

## 2025-06-16 PROCEDURE — 1126F AMNT PAIN NOTED NONE PRSNT: CPT | Performed by: FAMILY MEDICINE

## 2025-06-16 RX ORDER — MUPIROCIN 2 %
OINTMENT (GRAM) TOPICAL 3 TIMES DAILY
Qty: 30 G | Refills: 0 | Status: SHIPPED | OUTPATIENT
Start: 2025-06-16

## 2025-06-16 ASSESSMENT — ENCOUNTER SYMPTOMS
CONSTITUTIONAL NEGATIVE: 1
RESPIRATORY NEGATIVE: 1
CARDIOVASCULAR NEGATIVE: 1

## 2025-06-16 ASSESSMENT — PAIN SCALES - GENERAL: PAINLEVEL_OUTOF10: NO PAIN (0)

## 2025-06-16 NOTE — PROGRESS NOTES
Assessment & Plan   Problem List Items Addressed This Visit    None  Visit Diagnoses         Erysipelas    -  Primary    Relevant Medications    mupirocin (BACTROBAN) 2 % external ointment              Diagnoses and all orders for this visit:  Erysipelas  -     mupirocin (BACTROBAN) 2 % external ointment; Apply topically 3 times daily.    I truly think this is a erysipelas.  Probably is keeping this from being acute by using antibacterial soap.  My differential does include lupus.  Also may consider testing for strep if this does not respond to topical treatment    Mupirocin.  If she still has a rash in 7 days she is to return for further testing.      Subjective   Alize Sloan A 29 year old female    Presents with complaint of rash in her face.  She states somewhere in April she had what she describes as as a sinus and throat problem prior to the rash she stated that treated with any antibiotics as she thought it was viral.  The rash started about a week later.  She tried over-the-counter medications but those were not helping these included the antifungals and steroids.  She has no breathing trouble no fever.  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  Was recently seen in the rapid clinic and they gave her Protopic.  She did not get this filled due to cost.  The rash at times did have slight bumps that were fluid-filled that it would scab.  She is currently using antibacterial soap    History of Present Illness       Reason for visit:  Rash on my face under my eyes has been persisting for 7 weeks now    She eats 2-3 servings of fruits and vegetables daily.She consumes 0 sweetened beverage(s) daily.She exercises with enough effort to increase her heart rate 20 to 29 minutes per day.  She exercises with enough effort to increase her heart rate 3 or less days per week.   She is taking medications regularly.    Past Medical History:   Diagnosis Date    Pericardial cyst     Preeclampsia in postpartum period 2022     Recurrent pregnancy loss       reports that she has been smoking cigarettes. She has never used smokeless tobacco. She reports that she does not currently use alcohol. She reports that she does not use drugs.  Family History   Problem Relation Age of Onset    Thyroid nodules Mother     Dysfunctional uterine bleeding Mother     Colon Polyps Mother     Osteoarthritis Mother     Hyperlipidemia Father     Hypertension Father     Hypertension Sister     Diabetes Paternal Grandfather     Cerebrovascular Disease Paternal Grandfather     Myocardial Infarction Paternal Grandfather      Allergies   Allergen Reactions    Azithromycin Rash    Gin [Alcohol] Swelling     Eye swelling and itching        Current Outpatient Medications:     mupirocin (BACTROBAN) 2 % external ointment, Apply topically 3 times daily., Disp: 30 g, Rfl: 0  Review of Systems   Constitutional: Negative.    HENT: Negative.     Respiratory: Negative.     Cardiovascular: Negative.    Skin:  Positive for rash.         Objective      BP 95/68 (BP Location: Left arm, Patient Position: Sitting, Cuff Size: Adult Large)   Pulse 88   Temp 98  F (36.7  C) (Tympanic)   Resp 22   Wt 104.3 kg (230 lb)   LMP 06/02/2025 (Approximate)   SpO2 100%   BMI 36.02 kg/m    Body mass index is 36.02 kg/m .  Physical Exam  Constitutional:       Appearance: Normal appearance.   HENT:      Head: Normocephalic.      Right Ear: Tympanic membrane and ear canal normal.      Left Ear: Tympanic membrane and ear canal normal.      Nose: Nose normal. No congestion or rhinorrhea.      Mouth/Throat:      Mouth: Mucous membranes are moist.      Pharynx: No oropharyngeal exudate or posterior oropharyngeal erythema.   Cardiovascular:      Rate and Rhythm: Normal rate and regular rhythm.      Heart sounds: No murmur heard.  Pulmonary:      Effort: Pulmonary effort is normal.      Breath sounds: Normal breath sounds.   Musculoskeletal:      Cervical back: Neck supple.   Lymphadenopathy:       "Cervical: No cervical adenopathy.   Skin:     Comments: Patient has indurated skin over both cheeks it does cross the bridge of the nose where she does have a slight fissure.  It looks as though she has some honey crusted element to that fissure.  She states that the rash does burn.   Neurological:      Mental Status: She is alert.         Lab Results   Component Value Date    WBC 7.8 07/17/2022    HGB 10.5 (L) 07/17/2022     07/17/2022    CHOL 135 01/09/2023    TRIG 75 01/09/2023    HDL 45 (L) 01/09/2023    ALT 14 01/09/2023    AST 14 01/09/2023     01/09/2023    BUN 14.2 01/09/2023    CO2 26 01/09/2023    TSH 0.81 01/09/2023       Hospital Outpatient Visit on 09/26/2023   Component Date Value Ref Range Status    LVEF  09/26/2023 55-60%   Final         No results for input(s): \"TSH\", \"UA\", \"PSA\", \"HGBA1C\" in the last 336 hours.    Invalid input(s): \"CBC\", \"CMP\", \"LIPIDS\", \"BMP\", \"MICROALBU\"       "

## 2025-06-16 NOTE — NURSING NOTE
"Chief Complaint   Patient presents with    Derm Problem       Initial BP 95/68 (BP Location: Left arm, Patient Position: Sitting, Cuff Size: Adult Large)   Pulse 88   Temp 98  F (36.7  C) (Tympanic)   Resp 22   Wt 104.3 kg (230 lb)   LMP 06/02/2025 (Approximate)   SpO2 100%   BMI 36.02 kg/m   Estimated body mass index is 36.02 kg/m  as calculated from the following:    Height as of 6/10/25: 1.702 m (5' 7\").    Weight as of this encounter: 104.3 kg (230 lb).  Medication Review: complete    The next two questions are to help us understand your food security.  If you are feeling you need any assistance in this area, we have resources available to support you today.          6/16/2025   SDOH- Food Insecurity   Within the past 12 months, did you worry that your food would run out before you got money to buy more? N   Within the past 12 months, did the food you bought just not last and you didn t have money to get more? N         Health Care Directive:  Patient does not have a Health Care Directive: Discussed advance care planning with patient; however, patient declined at this time.    Courtney Ozuna      "

## 2025-06-26 ENCOUNTER — OFFICE VISIT (OUTPATIENT)
Dept: FAMILY MEDICINE | Facility: OTHER | Age: 30
End: 2025-06-26
Attending: FAMILY MEDICINE
Payer: COMMERCIAL

## 2025-06-26 ENCOUNTER — RESULTS FOLLOW-UP (OUTPATIENT)
Dept: FAMILY MEDICINE | Facility: OTHER | Age: 30
End: 2025-06-26

## 2025-06-26 VITALS
WEIGHT: 220 LBS | DIASTOLIC BLOOD PRESSURE: 70 MMHG | RESPIRATION RATE: 18 BRPM | HEART RATE: 80 BPM | OXYGEN SATURATION: 99 % | BODY MASS INDEX: 34.46 KG/M2 | SYSTOLIC BLOOD PRESSURE: 126 MMHG | TEMPERATURE: 96.9 F

## 2025-06-26 DIAGNOSIS — R21 RASH: Primary | ICD-10-CM

## 2025-06-26 LAB — S PYO DNA THROAT QL NAA+PROBE: NOT DETECTED

## 2025-06-26 PROCEDURE — 87070 CULTURE OTHR SPECIMN AEROBIC: CPT | Mod: ZL | Performed by: FAMILY MEDICINE

## 2025-06-26 PROCEDURE — 87651 STREP A DNA AMP PROBE: CPT | Mod: ZL | Performed by: FAMILY MEDICINE

## 2025-06-26 RX ORDER — DOXYCYCLINE HYCLATE 100 MG
100 TABLET ORAL 2 TIMES DAILY
Qty: 20 TABLET | Refills: 0 | Status: SHIPPED | OUTPATIENT
Start: 2025-06-26 | End: 2025-07-06

## 2025-06-26 ASSESSMENT — PAIN SCALES - GENERAL: PAINLEVEL_OUTOF10: NO PAIN (0)

## 2025-06-26 NOTE — PROGRESS NOTES
"Nursing Notes:   Shravan BatistanaNIRAV bradshaw  6/26/2025 11:11 AM  Sign at exiting of workspace  Chief Complaint   Patient presents with    Derm Problem   Rash on face     Initial /70 (BP Location: Right arm, Patient Position: Sitting, Cuff Size: Adult Regular)   Pulse 80   Temp 96.9  F (36.1  C) (Temporal)   Resp 18   Wt 99.8 kg (220 lb)   LMP 06/02/2025 (Approximate)   SpO2 99%   BMI 34.46 kg/m   Estimated body mass index is 34.46 kg/m  as calculated from the following:    Height as of 6/10/25: 1.702 m (5' 7\").    Weight as of this encounter: 99.8 kg (220 lb).  Medication Review: complete    The next two questions are to help us understand your food security.  If you are feeling you need any assistance in this area, we have resources available to support you today.          6/26/2025   SDOH- Food Insecurity   Within the past 12 months, did you worry that your food would run out before you got money to buy more? N   Within the past 12 months, did the food you bought just not last and you didn t have money to get more? N         Health Care Directive:  Patient does not have a Health Care Directive: Discussed advance care planning with patient; however, patient declined at this time.    Marcella Batista LPN 6/26/2025 11:11 AM        Markie Herrmann is a 29 year old, presenting for the following health issues:  Derm Problem        6/16/2025     1:36 PM   Additional Questions   Roomed by Courtney COSBY     Alize is here today for ongoing issues with a rash on her face.  She has had this for nearly 2 months.  Rash has been under her eyes.  Has been itchy and red.  She had tried over the counter hydrocortisone  and miconazole creams.  She was seen in [facility] on 6/10/25 and was given tacrolimus cream.  This was not covered and very expensive.  She recalls that when her rash started she had had a sore throat and sinus symptoms.  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  She was seen again on 6/16/25 with Dr. Bryan and felt " to have erysipelas.  She was given topical mupirocin cream.  Rash had started initially after upper respiratory illness had started.  She had not been treated with any antibiotics at the time.  She had been exposed to strep at that time.  The redness has gotten better with use of mupirocin.  The open areas are healed, but the underlying rash is still there.      History of Present Illness       Reason for visit:  Rash on my face under my eyes has been persisting for 7 weeks now    She eats 2-3 servings of fruits and vegetables daily.She consumes 0 sweetened beverage(s) daily.She exercises with enough effort to increase her heart rate 20 to 29 minutes per day.  She exercises with enough effort to increase her heart rate 3 or less days per week.   She is taking medications regularly.          Review of Systems  Constitutional, HEENT, cardiovascular, pulmonary, GI, , musculoskeletal, neuro, skin, endocrine and psych systems are negative, except as otherwise noted.      Objective    /70 (BP Location: Right arm, Patient Position: Sitting, Cuff Size: Adult Regular)   Pulse 80   Temp 96.9  F (36.1  C) (Temporal)   Resp 18   Wt 99.8 kg (220 lb)   LMP 06/02/2025 (Approximate)   SpO2 99%   BMI 34.46 kg/m    Body mass index is 34.46 kg/m .  Physical Exam  Constitutional:       Appearance: Normal appearance.   HENT:      Head: Normocephalic.      Nose: Nose normal. No congestion.      Mouth/Throat:      Mouth: Mucous membranes are moist.      Pharynx: No oropharyngeal exudate or posterior oropharyngeal erythema.   Eyes:      Extraocular Movements: Extraocular movements intact.      Pupils: Pupils are equal, round, and reactive to light.   Cardiovascular:      Rate and Rhythm: Normal rate and regular rhythm.      Heart sounds: Normal heart sounds. No murmur heard.  Pulmonary:      Effort: Pulmonary effort is normal.      Breath sounds: Normal breath sounds. No wheezing, rhonchi or rales.   Musculoskeletal:       Cervical back: Normal range of motion and neck supple.   Lymphadenopathy:      Cervical: No cervical adenopathy.   Skin:     Comments: She has a rash across both cheeks, which is slightly raised in areas with some healing scabbing on the left side.  Culture obtained.   Neurological:      Mental Status: She is alert.            ICD-10-CM    1. Rash  R21 Group A Streptococcus PCR Throat Swab     Adult Dermatology  Referral     doxycycline hyclate (VIBRA-TABS) 100 MG tablet     Skin Aerobic Bacterial Culture Routine Without Gram Stain     Skin Aerobic Bacterial Culture Routine Without Gram Stain           This is only mildly improved with use of mupirocin.  Culture obtained from her left cheek which was a little more crusty than underlying rash.  Also sent testing for strep via throat swab with exposure around the time this had started.  Treat with doxycycline in the even that she could have a resistant staph causing rash, but also should cover strep as well if that were underlying etiology.  Also referred to Dermatology in the event that this does not improve to evaluate for the possibility of lupus given location of rash in malar area.     No follow-ups on file.         Marisabel Terry MD

## 2025-06-26 NOTE — NURSING NOTE
"Chief Complaint   Patient presents with    Derm Problem   Rash on face     Initial /70 (BP Location: Right arm, Patient Position: Sitting, Cuff Size: Adult Regular)   Pulse 80   Temp 96.9  F (36.1  C) (Temporal)   Resp 18   Wt 99.8 kg (220 lb)   LMP 06/02/2025 (Approximate)   SpO2 99%   BMI 34.46 kg/m   Estimated body mass index is 34.46 kg/m  as calculated from the following:    Height as of 6/10/25: 1.702 m (5' 7\").    Weight as of this encounter: 99.8 kg (220 lb).  Medication Review: complete    The next two questions are to help us understand your food security.  If you are feeling you need any assistance in this area, we have resources available to support you today.          6/26/2025   SDOH- Food Insecurity   Within the past 12 months, did you worry that your food would run out before you got money to buy more? N   Within the past 12 months, did the food you bought just not last and you didn t have money to get more? N         Health Care Directive:  Patient does not have a Health Care Directive: Discussed advance care planning with patient; however, patient declined at this time.    Marcella Batista LPN 6/26/2025 11:11 AM        "

## 2025-07-07 ENCOUNTER — MYC MEDICAL ADVICE (OUTPATIENT)
Dept: FAMILY MEDICINE | Facility: OTHER | Age: 30
End: 2025-07-07
Payer: COMMERCIAL

## 2025-07-07 DIAGNOSIS — R21 RASH: Primary | ICD-10-CM

## 2025-07-07 DIAGNOSIS — L30.9 DERMATITIS OF FACE: ICD-10-CM

## 2025-07-07 DIAGNOSIS — A46 ERYSIPELAS: ICD-10-CM

## 2025-07-07 NOTE — TELEPHONE ENCOUNTER
Finished doxy and rash has not improved at all. 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     Has upcoming derm appt on 7/21    Seen at [facility] on 6/26 with [facility] and [facility] on 6/16 with Dr. CELESTINA Layne'd up order for CBC, CMP, PATRICIA.    Routing to provider to review and respond.  Mercedes Hudson RN on 7/7/2025 at 3:21 PM

## 2025-07-08 NOTE — TELEPHONE ENCOUNTER
Patient was contacted and an appointment was made with Dr. GÓMEZ for 7.10.2025.  Brandi Strickland on 7/8/2025 at 9:08 AM

## 2025-07-10 ENCOUNTER — OFFICE VISIT (OUTPATIENT)
Dept: FAMILY MEDICINE | Facility: OTHER | Age: 30
End: 2025-07-10
Attending: FAMILY MEDICINE
Payer: COMMERCIAL

## 2025-07-10 VITALS
WEIGHT: 231 LBS | TEMPERATURE: 97.6 F | RESPIRATION RATE: 12 BRPM | HEART RATE: 90 BPM | DIASTOLIC BLOOD PRESSURE: 76 MMHG | BODY MASS INDEX: 36.18 KG/M2 | OXYGEN SATURATION: 98 % | SYSTOLIC BLOOD PRESSURE: 122 MMHG

## 2025-07-10 DIAGNOSIS — L30.9 DERMATITIS OF FACE: ICD-10-CM

## 2025-07-10 DIAGNOSIS — R21 RASH: Primary | ICD-10-CM

## 2025-07-10 LAB — CRP SERPL HS-MCNC: 5.09 MG/L

## 2025-07-10 PROCEDURE — 36415 COLL VENOUS BLD VENIPUNCTURE: CPT | Mod: ZL | Performed by: FAMILY MEDICINE

## 2025-07-10 PROCEDURE — 86141 C-REACTIVE PROTEIN HS: CPT | Mod: ZL | Performed by: FAMILY MEDICINE

## 2025-07-10 ASSESSMENT — ENCOUNTER SYMPTOMS
JOINT SWELLING: 0
CARDIOVASCULAR NEGATIVE: 1
CONSTITUTIONAL NEGATIVE: 1
MUSCULOSKELETAL NEGATIVE: 1

## 2025-07-10 ASSESSMENT — PAIN SCALES - GENERAL: PAINLEVEL_OUTOF10: NO PAIN (0)

## 2025-07-10 NOTE — PROGRESS NOTES
Assessment & Plan   Problem List Items Addressed This Visit    None  Visit Diagnoses         Rash    -  Primary    Relevant Orders    C-Reactive Protein, High Sensitivity (CRP)    LUPUS ANTIBODY PANEL      Dermatitis of face        Relevant Orders    C-Reactive Protein, High Sensitivity (CRP)    LUPUS ANTIBODY PANEL              Diagnoses and all orders for this visit:  Rash  -     C-Reactive Protein, High Sensitivity (CRP); Future  -     LUPUS ANTIBODY PANEL  Dermatitis of face  -     C-Reactive Protein, High Sensitivity (CRP); Future  -     LUPUS ANTIBODY PANEL      I will check the lupus antibody panel and a C-reactive protein.  Patient is going to go to dermatology in the near future.  She can use the mupirocin as needed.  I advised not using doxycycline in full sunlight any more.        Subjective   Alize Sloan A 29 year old female    Patient presents for recheck for rash.  She states initially when she started the mupirocin the bumps went away on her nose and cheeks.  The redness remain the fissure on her nose did heal.  She states also she lost some skin with scaling.  She showed me a picture of this.  Otherwise the rash has remained unchanged.  She did see another doctor who put her on doxycycline.  She had a reaction on her chest from UV radiation from the sun and the doxycycline.    Rash  Associated symptoms include a rash. Pertinent negatives include no joint swelling.   History of Present Illness       Reason for visit:  Rash on my face    She eats 2-3 servings of fruits and vegetables daily.She consumes 0 sweetened beverage(s) daily.She exercises with enough effort to increase her heart rate 20 to 29 minutes per day.  She exercises with enough effort to increase her heart rate 3 or less days per week.   She is taking medications regularly.    Past Medical History:   Diagnosis Date    Pericardial cyst     Preeclampsia in postpartum period 2022    Recurrent pregnancy loss       reports that she  has been smoking cigarettes. She has never used smokeless tobacco. She reports that she does not currently use alcohol. She reports that she does not use drugs.  Family History   Problem Relation Age of Onset    Thyroid nodules Mother     Dysfunctional uterine bleeding Mother     Colon Polyps Mother     Osteoarthritis Mother     Hyperlipidemia Father     Hypertension Father     Hypertension Sister     Diabetes Paternal Grandfather     Cerebrovascular Disease Paternal Grandfather     Myocardial Infarction Paternal Grandfather      Allergies   Allergen Reactions    Azithromycin Rash    Gin [Alcohol] Swelling     Eye swelling and itching      No current outpatient medications on file.  Review of Systems   Constitutional: Negative.    Cardiovascular: Negative.    Musculoskeletal: Negative.  Negative for joint swelling.   Skin:  Positive for rash.         Objective      /76 (BP Location: Right arm, Patient Position: Sitting, Cuff Size: Adult Large)   Pulse 90   Temp 97.6  F (36.4  C) (Temporal)   Resp 12   Wt 104.8 kg (231 lb)   LMP 06/30/2025 (Approximate)   SpO2 98%   BMI 36.18 kg/m    Body mass index is 36.18 kg/m .  Physical Exam  Constitutional:       Appearance: Normal appearance. She is normal weight.   HENT:      Head: Normocephalic.      Nose: Nose normal. No congestion.   Skin:     Capillary Refill: Capillary refill takes less than 2 seconds.      Comments: Patient again has a raised reddened brown rash over both cheeks and bridge across the nose.  Please see the pictures in media to see the rash.  There is 1 small ulcerated area that is appears to be healing.  There is no fissures no scabbing no scaling skin at this time.  No papules or pustules are noted   Neurological:      General: No focal deficit present.      Mental Status: She is alert and oriented to person, place, and time. Mental status is at baseline.   Psychiatric:         Mood and Affect: Mood normal.         Behavior: Behavior  "normal.         Thought Content: Thought content normal.         Lab Results   Component Value Date    WBC 7.8 07/17/2022    HGB 10.5 (L) 07/17/2022     07/17/2022    CHOL 135 01/09/2023    TRIG 75 01/09/2023    HDL 45 (L) 01/09/2023    ALT 14 01/09/2023    AST 14 01/09/2023     01/09/2023    BUN 14.2 01/09/2023    CO2 26 01/09/2023    TSH 0.81 01/09/2023       Office Visit on 06/26/2025   Component Date Value Ref Range Status    Group A strep by PCR 06/26/2025 Not Detected  Not Detected Final         No results for input(s): \"TSH\", \"UA\", \"PSA\", \"HGBA1C\" in the last 336 hours.    Invalid input(s): \"CBC\", \"CMP\", \"LIPIDS\", \"BMP\", \"MICROALBU\"       "

## 2025-07-10 NOTE — NURSING NOTE
"Chief Complaint   Patient presents with    Rash     Face 11 weeks    Chest 2 weeks ago         Patient presents for ongoing rash on face.  Denies pain at this time.      Initial /76 (BP Location: Right arm, Patient Position: Sitting, Cuff Size: Adult Large)   Pulse 90   Temp 97.6  F (36.4  C) (Temporal)   Resp 12   Wt 104.8 kg (231 lb)   LMP 06/30/2025 (Approximate)   SpO2 98%   BMI 36.18 kg/m   Estimated body mass index is 36.18 kg/m  as calculated from the following:    Height as of 6/10/25: 1.702 m (5' 7\").    Weight as of this encounter: 104.8 kg (231 lb).  Medication Review: complete    The next two questions are to help us understand your food security.  If you are feeling you need any assistance in this area, we have resources available to support you today.          6/26/2025   SDOH- Food Insecurity   Within the past 12 months, did you worry that your food would run out before you got money to buy more? N   Within the past 12 months, did the food you bought just not last and you didn t have money to get more? N         Health Care Directive:  Patient does not have a Health Care Directive: Discussed advance care planning with patient; however, patient declined at this time.    Dipika Ulloa LPN on 7/10/2025 at 9:26 AM        "

## 2025-07-14 LAB — ANA SER QL IF: NEGATIVE

## 2025-07-21 ENCOUNTER — TRANSFERRED RECORDS (OUTPATIENT)
Dept: HEALTH INFORMATION MANAGEMENT | Facility: OTHER | Age: 30
End: 2025-07-21
Payer: COMMERCIAL

## (undated) DEVICE — SLEEVE COMPRESSION SCD KNEE MED 74022

## (undated) DEVICE — PACK LITHOTOMY SBA15LIFCA

## (undated) DEVICE — PANTIES MESH LG/XLG 2PK 706M2

## (undated) DEVICE — PAD PERI INDIV WRAP 11" 2022A

## (undated) DEVICE — TUBING VACUUM COLLECTION 6FT 23116

## (undated) DEVICE — SPECIMEN TRAP VACUUM SUCTION 003984-901

## (undated) DEVICE — TUBING SUCTION BOTTLE ASSEMBLY DISP 20714

## (undated) DEVICE — STRAP STIRRUP W/O SLIP 30187-020

## (undated) DEVICE — LIGHT HANDLES PLASTIC

## (undated) DEVICE — SUCTION CANNULA UTERINE 08MM CVD  20317

## (undated) DEVICE — PAD CHUX UNDERPAD 30X36" P3036C

## (undated) DEVICE — PREP SKIN SCRUB TRAY 4461A

## (undated) DEVICE — SOL WATER 1500ML

## (undated) DEVICE — DRSG TELFA 3X8" 1238

## (undated) DEVICE — GLOVE PROTEXIS POWDER FREE SMT 7.5  2D72PT75X

## (undated) RX ORDER — ACETAMINOPHEN 500 MG
TABLET ORAL
Status: DISPENSED
Start: 2020-01-28

## (undated) RX ORDER — FENTANYL CITRATE 50 UG/ML
INJECTION, SOLUTION INTRAMUSCULAR; INTRAVENOUS
Status: DISPENSED
Start: 2019-02-01

## (undated) RX ORDER — HYDROMORPHONE HYDROCHLORIDE 1 MG/ML
INJECTION, SOLUTION INTRAMUSCULAR; INTRAVENOUS; SUBCUTANEOUS
Status: DISPENSED
Start: 2022-07-14

## (undated) RX ORDER — PROPOFOL 10 MG/ML
INJECTION, EMULSION INTRAVENOUS
Status: DISPENSED
Start: 2019-02-01

## (undated) RX ORDER — ONDANSETRON 2 MG/ML
INJECTION INTRAMUSCULAR; INTRAVENOUS
Status: DISPENSED
Start: 2019-02-01

## (undated) RX ORDER — ACETAMINOPHEN 500 MG
TABLET ORAL
Status: DISPENSED
Start: 2019-06-23

## (undated) RX ORDER — LIDOCAINE HYDROCHLORIDE 20 MG/ML
INJECTION, SOLUTION EPIDURAL; INFILTRATION; INTRACAUDAL; PERINEURAL
Status: DISPENSED
Start: 2019-02-01

## (undated) RX ORDER — KETOROLAC TROMETHAMINE 30 MG/ML
INJECTION, SOLUTION INTRAMUSCULAR; INTRAVENOUS
Status: DISPENSED
Start: 2022-07-14

## (undated) RX ORDER — BUPIVACAINE HYDROCHLORIDE 2.5 MG/ML
INJECTION, SOLUTION EPIDURAL; INFILTRATION; INTRACAUDAL
Status: DISPENSED
Start: 2019-02-01

## (undated) RX ORDER — CEFAZOLIN SODIUM 2 G/100ML
INJECTION, SOLUTION INTRAVENOUS
Status: DISPENSED
Start: 2019-02-01

## (undated) RX ORDER — KETAMINE HYDROCHLORIDE 50 MG/ML
INJECTION, SOLUTION INTRAMUSCULAR; INTRAVENOUS
Status: DISPENSED
Start: 2019-02-01

## (undated) RX ORDER — ONDANSETRON 4 MG/1
TABLET, ORALLY DISINTEGRATING ORAL
Status: DISPENSED
Start: 2022-07-14

## (undated) RX ORDER — ACETAMINOPHEN 325 MG/1
TABLET ORAL
Status: DISPENSED
Start: 2022-07-17

## (undated) RX ORDER — BUPIVACAINE HYDROCHLORIDE 5 MG/ML
INJECTION, SOLUTION EPIDURAL; INTRACAUDAL
Status: DISPENSED
Start: 2020-01-09

## (undated) RX ORDER — KETOROLAC TROMETHAMINE 30 MG/ML
INJECTION, SOLUTION INTRAMUSCULAR; INTRAVENOUS
Status: DISPENSED
Start: 2019-02-01

## (undated) RX ORDER — ACETAMINOPHEN 500 MG
TABLET ORAL
Status: DISPENSED
Start: 2018-10-26

## (undated) RX ORDER — LIDOCAINE HCL/EPINEPHRINE/PF 2%-1:200K
VIAL (ML) INJECTION
Status: DISPENSED
Start: 2022-11-30